# Patient Record
Sex: MALE | Race: WHITE | URBAN - METROPOLITAN AREA
[De-identification: names, ages, dates, MRNs, and addresses within clinical notes are randomized per-mention and may not be internally consistent; named-entity substitution may affect disease eponyms.]

---

## 2017-04-06 ENCOUNTER — DOCTOR'S OFFICE (OUTPATIENT)
Dept: URBAN - METROPOLITAN AREA CLINIC 137 | Facility: CLINIC | Age: 52
Setting detail: OPHTHALMOLOGY
End: 2017-04-06
Payer: COMMERCIAL

## 2017-04-06 DIAGNOSIS — E11.9: ICD-10-CM

## 2017-04-06 DIAGNOSIS — H25.13: ICD-10-CM

## 2017-04-06 PROCEDURE — 92014 COMPRE OPH EXAM EST PT 1/>: CPT | Performed by: OPHTHALMOLOGY

## 2017-04-06 ASSESSMENT — REFRACTION_MANIFEST
OS_VA1: 20/
OS_VA2: 20/
OS_ADD: +1.75
OD_VA3: 20/
OD_ADD: +1.75
OS_SPHERE: +0.50
OD_VA2: 20/
OU_VA: 20/
OS_CYLINDER: SPH
OS_VA3: 20/
OS_VA1: 20/20
OU_VA: 20/
OD_VA3: 20/
OD_VA1: 20/
OD_VA3: 20/
OD_CYLINDER: +0.25
OS_VA1: 20/
OD_AXIS: 143
OD_VA1: 20/
OS_VA2: 20/20
OD_VA2: 20/20
OD_SPHERE: +0.25
OS_VA2: 20/
OD_VA2: 20/
OD_VA1: 20/20
OU_VA: 20/
OS_VA3: 20/
OS_VA3: 20/

## 2017-04-06 ASSESSMENT — CONFRONTATIONAL VISUAL FIELD TEST (CVF)
OD_FINDINGS: FULL
OS_FINDINGS: FULL

## 2017-04-06 ASSESSMENT — REFRACTION_CURRENTRX
OS_CYLINDER: SPH
OD_VPRISM_DIRECTION: SV
OS_OVR_VA: 20/
OD_OVR_VA: 20/
OS_OVR_VA: 20/
OS_OVR_VA: 20/
OD_SPHERE: +2.00
OD_OVR_VA: 20/
OS_VPRISM_DIRECTION: SV
OD_CYLINDER: SPH
OD_OVR_VA: 20/
OS_SPHERE: +2.00

## 2017-04-06 ASSESSMENT — SPHEQUIV_DERIVED
OS_SPHEQUIV: 0.625
OD_SPHEQUIV: 0.375
OD_SPHEQUIV: 0.375

## 2017-04-06 ASSESSMENT — REFRACTION_AUTOREFRACTION
OS_CYLINDER: +0.25
OD_AXIS: 136
OD_SPHERE: +0.25
OD_CYLINDER: +0.25
OS_AXIS: 154
OS_SPHERE: +0.50

## 2017-04-06 ASSESSMENT — VISUAL ACUITY
OD_BCVA: 20/25-1
OS_BCVA: 20/20

## 2018-04-11 ENCOUNTER — DOCTOR'S OFFICE (OUTPATIENT)
Dept: URBAN - METROPOLITAN AREA CLINIC 137 | Facility: CLINIC | Age: 53
Setting detail: OPHTHALMOLOGY
End: 2018-04-11
Payer: COMMERCIAL

## 2018-04-11 DIAGNOSIS — H25.13: ICD-10-CM

## 2018-04-11 DIAGNOSIS — E11.9: ICD-10-CM

## 2018-04-11 PROCEDURE — 92014 COMPRE OPH EXAM EST PT 1/>: CPT | Performed by: OPHTHALMOLOGY

## 2018-04-11 ASSESSMENT — REFRACTION_MANIFEST
OD_VA2: 20/20
OD_VA1: 20/
OD_VA2: 20/
OS_VA2: 20/20
OD_VA2: 20/
OS_CYLINDER: SPH
OS_VA3: 20/
OS_ADD: +1.75
OD_VA1: 20/
OD_VA3: 20/
OS_VA1: 20/
OS_VA3: 20/
OS_VA1: 20/20
OS_SPHERE: +0.50
OD_SPHERE: +0.25
OD_VA3: 20/
OD_CYLINDER: +0.25
OD_VA1: 20/20
OS_VA3: 20/
OS_VA2: 20/
OS_VA2: 20/
OU_VA: 20/
OD_AXIS: 143
OU_VA: 20/
OS_VA1: 20/
OU_VA: 20/
OD_VA3: 20/
OD_ADD: +1.75

## 2018-04-11 ASSESSMENT — REFRACTION_CURRENTRX
OD_OVR_VA: 20/
OS_CYLINDER: SPH
OS_OVR_VA: 20/
OD_OVR_VA: 20/
OS_OVR_VA: 20/
OD_SPHERE: +2.00
OS_VPRISM_DIRECTION: SV
OD_CYLINDER: SPH
OD_OVR_VA: 20/
OS_OVR_VA: 20/
OD_VPRISM_DIRECTION: SV
OS_SPHERE: +2.00

## 2018-04-11 ASSESSMENT — REFRACTION_AUTOREFRACTION
OS_SPHERE: +0.50
OD_CYLINDER: +0.25
OS_CYLINDER: +0.25
OS_AXIS: 154
OD_AXIS: 136
OD_SPHERE: +0.25

## 2018-04-11 ASSESSMENT — SPHEQUIV_DERIVED
OD_SPHEQUIV: 0.375
OS_SPHEQUIV: 0.625
OD_SPHEQUIV: 0.375

## 2018-04-11 ASSESSMENT — CONFRONTATIONAL VISUAL FIELD TEST (CVF)
OD_FINDINGS: FULL
OS_FINDINGS: FULL

## 2018-04-11 ASSESSMENT — VISUAL ACUITY
OD_BCVA: 20/25-2
OS_BCVA: 20/20-2

## 2019-12-05 ENCOUNTER — APPOINTMENT (INPATIENT)
Dept: RADIOLOGY | Facility: HOSPITAL | Age: 54
DRG: 948 | End: 2019-12-05
Payer: COMMERCIAL

## 2019-12-05 ENCOUNTER — HOSPITAL ENCOUNTER (INPATIENT)
Facility: HOSPITAL | Age: 54
LOS: 4 days | Discharge: HOME | DRG: 948 | End: 2019-12-09
Attending: EMERGENCY MEDICINE | Admitting: INTERNAL MEDICINE
Payer: COMMERCIAL

## 2019-12-05 ENCOUNTER — APPOINTMENT (EMERGENCY)
Dept: RADIOLOGY | Facility: HOSPITAL | Age: 54
DRG: 948 | End: 2019-12-05
Payer: COMMERCIAL

## 2019-12-05 DIAGNOSIS — R41.82 ALTERED MENTAL STATUS: Primary | ICD-10-CM

## 2019-12-05 DIAGNOSIS — G25.81 RESTLESS LEG: ICD-10-CM

## 2019-12-05 DIAGNOSIS — D72.829 LEUKOCYTOSIS: ICD-10-CM

## 2019-12-05 DIAGNOSIS — R41.0 DISORIENTATION: ICD-10-CM

## 2019-12-05 DIAGNOSIS — E53.8 FOLIC ACID DEFICIENCY: ICD-10-CM

## 2019-12-05 PROBLEM — F43.29: Status: ACTIVE | Noted: 2019-12-05

## 2019-12-05 PROBLEM — E78.5 HYPERLIPIDEMIA: Status: ACTIVE | Noted: 2019-12-05

## 2019-12-05 PROBLEM — E78.2 MIXED HYPERLIPIDEMIA: Status: ACTIVE | Noted: 2019-12-05

## 2019-12-05 PROBLEM — I10 HYPERTENSION: Status: ACTIVE | Noted: 2019-12-05

## 2019-12-05 PROBLEM — E11.9 DIABETES MELLITUS (HCC): Status: ACTIVE | Noted: 2019-12-05

## 2019-12-05 PROBLEM — Z79.4 TYPE 2 DIABETES MELLITUS WITHOUT COMPLICATION, WITH LONG-TERM CURRENT USE OF INSULIN (HCC): Status: ACTIVE | Noted: 2019-12-05

## 2019-12-05 LAB
ALBUMIN SERPL BCP-MCNC: 4.6 G/DL (ref 3.5–5)
ALP SERPL-CCNC: 102 U/L (ref 46–116)
ALT SERPL W P-5'-P-CCNC: 28 U/L (ref 12–78)
AMMONIA PLAS-SCNC: 42 UMOL/L (ref 11–35)
ANION GAP SERPL CALCULATED.3IONS-SCNC: 7 MMOL/L (ref 4–13)
APAP SERPL-MCNC: <2 UG/ML (ref 10–20)
APPEARANCE CSF: CLEAR
AST SERPL W P-5'-P-CCNC: 12 U/L (ref 5–45)
BASE EX.OXY STD BLDV CALC-SCNC: 81.2 % (ref 60–80)
BASE EXCESS BLDV CALC-SCNC: 0.5 MMOL/L
BASOPHILS # BLD AUTO: 0.1 THOUSANDS/ΜL (ref 0–0.1)
BASOPHILS NFR BLD AUTO: 1 % (ref 0–1)
BILIRUB SERPL-MCNC: 0.33 MG/DL (ref 0.2–1)
BILIRUB UR QL STRIP: NEGATIVE
BUN SERPL-MCNC: 16 MG/DL (ref 5–25)
CALCIUM SERPL-MCNC: 9.9 MG/DL (ref 8.3–10.1)
CHLORIDE SERPL-SCNC: 101 MMOL/L (ref 100–108)
CLARITY UR: CLEAR
CLARITY, POC: CLEAR
CO2 SERPL-SCNC: 29 MMOL/L (ref 21–32)
COLOR UR: YELLOW
COLOR, POC: YELLOW
CREAT SERPL-MCNC: 0.99 MG/DL (ref 0.6–1.3)
EOSINOPHIL # BLD AUTO: 0.18 THOUSAND/ΜL (ref 0–0.61)
EOSINOPHIL NFR BLD AUTO: 2 % (ref 0–6)
ERYTHROCYTE [DISTWIDTH] IN BLOOD BY AUTOMATED COUNT: 12.9 % (ref 11.6–15.1)
ETHANOL SERPL-MCNC: <3 MG/DL (ref 0–3)
GFR SERPL CREATININE-BSD FRML MDRD: 85 ML/MIN/1.73SQ M
GLUCOSE CSF-MCNC: 143 MG/DL (ref 50–80)
GLUCOSE SERPL-MCNC: 145 MG/DL (ref 65–140)
GLUCOSE SERPL-MCNC: 319 MG/DL (ref 65–140)
GLUCOSE SERPL-MCNC: 360 MG/DL (ref 65–140)
GLUCOSE UR STRIP-MCNC: ABNORMAL MG/DL
GRAM STN SPEC: NORMAL
GRAM STN SPEC: NORMAL
HCO3 BLDV-SCNC: 25.6 MMOL/L (ref 24–30)
HCT VFR BLD AUTO: 43.9 % (ref 36.5–49.3)
HGB BLD-MCNC: 14.3 G/DL (ref 12–17)
HGB UR QL STRIP.AUTO: NEGATIVE
IMM GRANULOCYTES # BLD AUTO: 0.03 THOUSAND/UL (ref 0–0.2)
IMM GRANULOCYTES NFR BLD AUTO: 0 % (ref 0–2)
IRON SERPL-MCNC: 54 UG/DL (ref 65–175)
KETONES UR STRIP-MCNC: NEGATIVE MG/DL
LEUKOCYTE ESTERASE UR QL STRIP: NEGATIVE
LYMPHOCYTES # BLD AUTO: 2.09 THOUSANDS/ΜL (ref 0.6–4.47)
LYMPHOCYTES NFR BLD AUTO: 19 % (ref 14–44)
MCH RBC QN AUTO: 28.2 PG (ref 26.8–34.3)
MCHC RBC AUTO-ENTMCNC: 32.6 G/DL (ref 31.4–37.4)
MCV RBC AUTO: 87 FL (ref 82–98)
MONOCYTES # BLD AUTO: 0.76 THOUSAND/ΜL (ref 0.17–1.22)
MONOCYTES NFR BLD AUTO: 7 % (ref 4–12)
NEUTROPHILS # BLD AUTO: 7.99 THOUSANDS/ΜL (ref 1.85–7.62)
NEUTS SEG NFR BLD AUTO: 71 % (ref 43–75)
NITRITE UR QL STRIP: NEGATIVE
NRBC BLD AUTO-RTO: 0 /100 WBCS
O2 CT BLDV-SCNC: 17.5 ML/DL
PCO2 BLDV: 42.7 MM HG (ref 42–50)
PH BLDV: 7.39 [PH] (ref 7.3–7.4)
PH UR STRIP.AUTO: 7 [PH] (ref 4.5–8)
PLATELET # BLD AUTO: 249 THOUSANDS/UL (ref 149–390)
PMV BLD AUTO: 10.5 FL (ref 8.9–12.7)
PO2 BLDV: 51.1 MM HG (ref 35–45)
POTASSIUM SERPL-SCNC: 4.9 MMOL/L (ref 3.5–5.3)
PROT CSF-MCNC: 45 MG/DL (ref 15–45)
PROT SERPL-MCNC: 7 G/DL (ref 6.4–8.2)
PROT UR STRIP-MCNC: NEGATIVE MG/DL
RBC # BLD AUTO: 5.07 MILLION/UL (ref 3.88–5.62)
RBC # CSF MANUAL: 2 UL (ref 0–10)
RBC # CSF MANUAL: 44 UL (ref 0–10)
SALICYLATES SERPL-MCNC: <3 MG/DL (ref 3–20)
SODIUM SERPL-SCNC: 137 MMOL/L (ref 136–145)
SP GR UR STRIP.AUTO: 1.01 (ref 1–1.03)
TOTAL CELLS COUNTED BLD: NO
TROPONIN I SERPL-MCNC: <0.02 NG/ML
TSH SERPL DL<=0.05 MIU/L-ACNC: 1.68 UIU/ML (ref 0.36–3.74)
TUBE # CSF: 4
UROBILINOGEN UR QL STRIP.AUTO: 0.2 E.U./DL
WBC # BLD AUTO: 11.15 THOUSAND/UL (ref 4.31–10.16)
WBC # CSF AUTO: 0 /UL (ref 0–5)

## 2019-12-05 PROCEDURE — 83655 ASSAY OF LEAD: CPT | Performed by: EMERGENCY MEDICINE

## 2019-12-05 PROCEDURE — 93005 ELECTROCARDIOGRAM TRACING: CPT

## 2019-12-05 PROCEDURE — 009U3ZX DRAINAGE OF SPINAL CANAL, PERCUTANEOUS APPROACH, DIAGNOSTIC: ICD-10-PCS | Performed by: EMERGENCY MEDICINE

## 2019-12-05 PROCEDURE — 81003 URINALYSIS AUTO W/O SCOPE: CPT

## 2019-12-05 PROCEDURE — 80320 DRUG SCREEN QUANTALCOHOLS: CPT | Performed by: EMERGENCY MEDICINE

## 2019-12-05 PROCEDURE — 82746 ASSAY OF FOLIC ACID SERUM: CPT | Performed by: INTERNAL MEDICINE

## 2019-12-05 PROCEDURE — 86140 C-REACTIVE PROTEIN: CPT | Performed by: INTERNAL MEDICINE

## 2019-12-05 PROCEDURE — 84484 ASSAY OF TROPONIN QUANT: CPT | Performed by: EMERGENCY MEDICINE

## 2019-12-05 PROCEDURE — 89050 BODY FLUID CELL COUNT: CPT | Performed by: EMERGENCY MEDICINE

## 2019-12-05 PROCEDURE — 82948 REAGENT STRIP/BLOOD GLUCOSE: CPT

## 2019-12-05 PROCEDURE — 87070 CULTURE OTHR SPECIMN AEROBIC: CPT | Performed by: EMERGENCY MEDICINE

## 2019-12-05 PROCEDURE — 87483 CNS DNA AMP PROBE TYPE 12-25: CPT | Performed by: EMERGENCY MEDICINE

## 2019-12-05 PROCEDURE — 99285 EMERGENCY DEPT VISIT HI MDM: CPT | Performed by: EMERGENCY MEDICINE

## 2019-12-05 PROCEDURE — 99244 OFF/OP CNSLTJ NEW/EST MOD 40: CPT | Performed by: EMERGENCY MEDICINE

## 2019-12-05 PROCEDURE — 80053 COMPREHEN METABOLIC PANEL: CPT | Performed by: EMERGENCY MEDICINE

## 2019-12-05 PROCEDURE — 85025 COMPLETE CBC W/AUTO DIFF WBC: CPT | Performed by: EMERGENCY MEDICINE

## 2019-12-05 PROCEDURE — 82607 VITAMIN B-12: CPT | Performed by: INTERNAL MEDICINE

## 2019-12-05 PROCEDURE — 96360 HYDRATION IV INFUSION INIT: CPT

## 2019-12-05 PROCEDURE — 89051 BODY FLUID CELL COUNT: CPT | Performed by: EMERGENCY MEDICINE

## 2019-12-05 PROCEDURE — 70496 CT ANGIOGRAPHY HEAD: CPT

## 2019-12-05 PROCEDURE — 36415 COLL VENOUS BLD VENIPUNCTURE: CPT | Performed by: EMERGENCY MEDICINE

## 2019-12-05 PROCEDURE — 82140 ASSAY OF AMMONIA: CPT | Performed by: EMERGENCY MEDICINE

## 2019-12-05 PROCEDURE — 84425 ASSAY OF VITAMIN B-1: CPT | Performed by: INTERNAL MEDICINE

## 2019-12-05 PROCEDURE — 70450 CT HEAD/BRAIN W/O DYE: CPT

## 2019-12-05 PROCEDURE — 70551 MRI BRAIN STEM W/O DYE: CPT

## 2019-12-05 PROCEDURE — 96361 HYDRATE IV INFUSION ADD-ON: CPT

## 2019-12-05 PROCEDURE — 82945 GLUCOSE OTHER FLUID: CPT | Performed by: EMERGENCY MEDICINE

## 2019-12-05 PROCEDURE — 84443 ASSAY THYROID STIM HORMONE: CPT | Performed by: EMERGENCY MEDICINE

## 2019-12-05 PROCEDURE — 80329 ANALGESICS NON-OPIOID 1 OR 2: CPT | Performed by: EMERGENCY MEDICINE

## 2019-12-05 PROCEDURE — 84157 ASSAY OF PROTEIN OTHER: CPT | Performed by: EMERGENCY MEDICINE

## 2019-12-05 PROCEDURE — 71046 X-RAY EXAM CHEST 2 VIEWS: CPT

## 2019-12-05 PROCEDURE — 99285 EMERGENCY DEPT VISIT HI MDM: CPT

## 2019-12-05 PROCEDURE — 82805 BLOOD GASES W/O2 SATURATION: CPT | Performed by: EMERGENCY MEDICINE

## 2019-12-05 PROCEDURE — 83036 HEMOGLOBIN GLYCOSYLATED A1C: CPT | Performed by: INTERNAL MEDICINE

## 2019-12-05 PROCEDURE — 83540 ASSAY OF IRON: CPT | Performed by: EMERGENCY MEDICINE

## 2019-12-05 RX ORDER — INSULIN GLARGINE 100 [IU]/ML
30 INJECTION, SOLUTION SUBCUTANEOUS 2 TIMES DAILY
Status: DISCONTINUED | OUTPATIENT
Start: 2019-12-06 | End: 2019-12-07

## 2019-12-05 RX ORDER — ASPIRIN 81 MG/1
81 TABLET ORAL DAILY
COMMUNITY

## 2019-12-05 RX ORDER — ATORVASTATIN CALCIUM 40 MG/1
40 TABLET, FILM COATED ORAL
Status: DISCONTINUED | OUTPATIENT
Start: 2019-12-06 | End: 2019-12-09 | Stop reason: HOSPADM

## 2019-12-05 RX ORDER — DOCUSATE SODIUM 100 MG/1
100 CAPSULE, LIQUID FILLED ORAL 2 TIMES DAILY PRN
Status: DISCONTINUED | OUTPATIENT
Start: 2019-12-05 | End: 2019-12-09 | Stop reason: HOSPADM

## 2019-12-05 RX ORDER — INSULIN GLARGINE 100 [IU]/ML
30 INJECTION, SOLUTION SUBCUTANEOUS 2 TIMES DAILY
COMMUNITY
End: 2020-09-15

## 2019-12-05 RX ORDER — ONDANSETRON 2 MG/ML
4 INJECTION INTRAMUSCULAR; INTRAVENOUS EVERY 6 HOURS PRN
Status: DISCONTINUED | OUTPATIENT
Start: 2019-12-05 | End: 2019-12-09 | Stop reason: HOSPADM

## 2019-12-05 RX ORDER — LANSOPRAZOLE 15 MG/1
15 CAPSULE, DELAYED RELEASE ORAL DAILY
COMMUNITY
End: 2022-01-25 | Stop reason: ALTCHOICE

## 2019-12-05 RX ORDER — MAGNESIUM HYDROXIDE/ALUMINUM HYDROXICE/SIMETHICONE 120; 1200; 1200 MG/30ML; MG/30ML; MG/30ML
30 SUSPENSION ORAL EVERY 6 HOURS PRN
Status: DISCONTINUED | OUTPATIENT
Start: 2019-12-05 | End: 2019-12-09 | Stop reason: HOSPADM

## 2019-12-05 RX ORDER — ROSUVASTATIN CALCIUM 20 MG/1
20 TABLET, COATED ORAL DAILY
COMMUNITY
End: 2020-08-11

## 2019-12-05 RX ORDER — ASPIRIN 81 MG/1
81 TABLET ORAL DAILY
Status: DISCONTINUED | OUTPATIENT
Start: 2019-12-06 | End: 2019-12-09 | Stop reason: HOSPADM

## 2019-12-05 RX ORDER — NICOTINE 21 MG/24HR
1 PATCH, TRANSDERMAL 24 HOURS TRANSDERMAL DAILY
Status: DISCONTINUED | OUTPATIENT
Start: 2019-12-06 | End: 2019-12-06

## 2019-12-05 RX ORDER — FENOFIBRATE 145 MG/1
145 TABLET, COATED ORAL DAILY
Status: DISCONTINUED | OUTPATIENT
Start: 2019-12-06 | End: 2019-12-09 | Stop reason: HOSPADM

## 2019-12-05 RX ORDER — IRBESARTAN 300 MG/1
300 TABLET ORAL DAILY
COMMUNITY

## 2019-12-05 RX ORDER — LORAZEPAM 2 MG/ML
1 INJECTION INTRAMUSCULAR ONCE
Status: COMPLETED | OUTPATIENT
Start: 2019-12-05 | End: 2019-12-05

## 2019-12-05 RX ORDER — LOSARTAN POTASSIUM 50 MG/1
100 TABLET ORAL DAILY
Status: DISCONTINUED | OUTPATIENT
Start: 2019-12-06 | End: 2019-12-09 | Stop reason: HOSPADM

## 2019-12-05 RX ORDER — ACETAMINOPHEN 325 MG/1
650 TABLET ORAL EVERY 4 HOURS PRN
Status: DISCONTINUED | OUTPATIENT
Start: 2019-12-05 | End: 2019-12-09 | Stop reason: HOSPADM

## 2019-12-05 RX ORDER — LORAZEPAM 2 MG/ML
INJECTION INTRAMUSCULAR
Status: COMPLETED
Start: 2019-12-05 | End: 2019-12-05

## 2019-12-05 RX ORDER — FENOFIBRATE 145 MG/1
145 TABLET, COATED ORAL DAILY
COMMUNITY

## 2019-12-05 RX ORDER — SODIUM CHLORIDE 9 MG/ML
75 INJECTION, SOLUTION INTRAVENOUS CONTINUOUS
Status: DISCONTINUED | OUTPATIENT
Start: 2019-12-05 | End: 2019-12-06

## 2019-12-05 RX ORDER — PANTOPRAZOLE SODIUM 40 MG/1
40 TABLET, DELAYED RELEASE ORAL
Status: DISCONTINUED | OUTPATIENT
Start: 2019-12-06 | End: 2019-12-09 | Stop reason: HOSPADM

## 2019-12-05 RX ADMIN — LORAZEPAM 1 MG: 2 INJECTION INTRAMUSCULAR; INTRAVENOUS at 22:42

## 2019-12-05 RX ADMIN — LORAZEPAM 1 MG: 2 INJECTION INTRAMUSCULAR at 22:40

## 2019-12-05 RX ADMIN — LORAZEPAM 1 MG: 2 INJECTION INTRAMUSCULAR; INTRAVENOUS at 22:40

## 2019-12-05 RX ADMIN — SODIUM CHLORIDE 75 ML/HR: 0.9 INJECTION, SOLUTION INTRAVENOUS at 23:36

## 2019-12-05 RX ADMIN — SODIUM CHLORIDE 1000 ML: 0.9 INJECTION, SOLUTION INTRAVENOUS at 16:16

## 2019-12-05 RX ADMIN — IOHEXOL 85 ML: 350 INJECTION, SOLUTION INTRAVENOUS at 19:43

## 2019-12-05 NOTE — LETTER
[address] [facility] [address]  [facility] [address]  Dept: [phone]    December 9, 2019     Patient: Kavita Verdugo   YOB: 1965   Date of Visit: 12/5/2019       To Whom it May Concern:    Kavita Verdugo is under my professional care  He was seen in the hospital from 12/5/2019   to 12/09/19  He may return to work on 12/18/19 with the following limitations do not use heavy machinery, drive, use electrical equipment       If you have any questions or concerns, please don't hesitate to call           Sincerely,          Donald Dias MD

## 2019-12-05 NOTE — ED PROVIDER NOTES
History  Chief Complaint   Patient presents with    Altered Mental Status     EMS was called for patient by his coworkers for [facility]  Pt has no idea what has happened today  Repeatative questioning     54 y/o M with a pmhx of DM insulin dependant presents via EMS from work for [facility]  Pt was observed to be acting funny at work and EMS was called  Pt's Glucose was 440 for EMS  Pt states that he has no complaints and repeatedly asks why he is here  Pt's wife states that over the past 2 weeks he has had confusion that has been intermittent  Pt has not hx or fhx of psychiatric dz or dementia  No head trauma or ID exposures  Spouse states that he has hd 3 CTs of the head which were negative and has decline MRIs due to claustrophobia  Prior to Admission Medications   Prescriptions Last Dose Informant Patient Reported? Taking? Liraglutide (VICTOZA SC)   Yes Yes   Sig: Inject 1 8 mcg under the skin daily   aspirin (ECOTRIN LOW STRENGTH) 81 mg EC tablet   Yes Yes   Sig: Take 81 mg by mouth daily   fenofibrate (TRICOR) 145 mg tablet   Yes Yes   Sig: Take 145 mg by mouth daily   insulin glargine (LANTUS) 100 units/mL subcutaneous injection   Yes Yes   Sig: Inject 30 Units under the skin 2 (two) times a day   irbesartan (AVAPRO) 300 mg tablet   Yes Yes   Sig: Take 300 mg by mouth daily   lansoprazole (PREVACID) 15 mg capsule   Yes Yes   Sig: Take 15 mg by mouth daily   metFORMIN (GLUCOPHAGE) 1000 MG tablet   Yes Yes   Sig: Take 1,000 mg by mouth 2 (two) times a day   rosuvastatin (CRESTOR) 20 MG tablet   Yes Yes   Sig: Take 20 mg by mouth daily      Facility-Administered Medications: None       Past Medical History:   Diagnosis Date    Diabetes mellitus ([address] )     GERD (gastroesophageal reflux disease)     Hyperlipidemia     Hypertension        History reviewed  No pertinent surgical history  History reviewed  No pertinent family history  I have reviewed and agree with the history as documented      Social History     Tobacco Use    Smoking status: Current Every Day Smoker     Packs/day: 0 50     Types: Cigarettes    Smokeless tobacco: Current User   Substance Use Topics    Alcohol use: Yes     Frequency: Never    Drug use: Never        Review of Systems   Unable to perform ROS: Mental status change       Physical Exam  ED Triage Vitals   Temperature Pulse Respirations Blood Pressure SpO2   12/05/19 1621 12/05/19 1545 12/05/19 1545 12/05/19 1545 12/05/19 1545   98 1 °F (36 7 °C) 89 16 152/75 97 %      Temp Source Heart Rate Source Patient Position - Orthostatic VS BP Location FiO2 (%)   12/05/19 1621 12/05/19 1545 12/05/19 1718 12/05/19 1718 --   Oral Monitor Sitting Right arm       Pain Score       12/05/19 1545       No Pain             Orthostatic Vital Signs  Vitals:    12/08/19 1634 12/09/19 0109 12/09/19 0744 12/09/19 1520   BP: 131/78 107/59 128/75 125/76   Pulse: 72 62 64 73   Patient Position - Orthostatic VS:  Lying         Physical Exam   Constitutional: He is oriented to person, place, and time  He appears well-developed and well-nourished  No distress  HENT:   Head: Normocephalic and atraumatic  Eyes: EOM are normal    Neck: Normal range of motion  Cardiovascular: Normal rate and regular rhythm  Exam reveals no gallop and no friction rub  No murmur heard  Pulmonary/Chest: Breath sounds normal  No respiratory distress  He has no wheezes  He has no rales  Abdominal: Soft  Normal appearance and bowel sounds are normal  There is no tenderness  There is no rigidity, no rebound, no guarding, no CVA tenderness, no tenderness at McBurney's point and negative Gardner's sign  Neurological: He is alert and oriented to person, place, and time  He has normal strength  He is not disoriented  No cranial nerve deficit or sensory deficit  GCS eye subscore is 4  GCS verbal subscore is 5  GCS motor subscore is 6  Reflex Scores:       Bicep reflexes are 2+ on the right side and 2+ on the left side  Patellar reflexes are 2+ on the right side and 2+ on the left side  Patient has equal 5/5 strength b/l UE and Le  No focal neuro deficits noted  Patient is a and O x4, however, continues to ask why he is in the emergency department   Skin: Skin is warm and dry  Capillary refill takes less than 2 seconds  Psychiatric: He has a normal mood and affect  His behavior is normal  Judgment and thought content normal    Nursing note and vitals reviewed  ED Medications  Medications   sodium chloride 0 9 % bolus 1,000 mL (0 mL Intravenous Stopped 12/5/19 2130)   iohexol (OMNIPAQUE) 350 MG/ML injection (MULTI-DOSE) 85 mL (85 mL Intravenous Given 12/5/19 1943)   LORazepam (ATIVAN) 2 mg/mL injection 1 mg (1 mg Intravenous Given 12/5/19 2240)   LORazepam (ATIVAN) 2 mg/mL injection 1 mg (1 mg Intravenous Given 12/5/19 2242)       Diagnostic Studies  Results Reviewed     Procedure Component Value Units Date/Time    Vitamin B1, whole blood [[id number]] Collected:  12/05/19 2334    Lab Status:  Final result Specimen:  Blood from Arm, Right Updated:  12/10/19 1406     Vitamin B1, Whole Blood 161 4 nmol/L     Narrative:       Test(s) 121188-Vit  B1, Whole Blood  was developed and its performance characteristics determined  by [facility]  It has not been cleared or approved by the Food  and Drug Administration    Performed at:  [address]  [id number]  : Zunilda Villafana MD, Phone:  [phone]    CSF, Culture and Gram stain (Tube 3) [[id number]] Collected:  12/05/19 2208    Lab Status:  Final result Specimen:  Cerebrospinal Fluid from Lumbar Puncture Updated:  12/09/19 0817     CSF Culture No growth    Lead, blood [[id number]] Collected:  12/05/19 1615    Lab Status:  Final result Specimen:  Blood from Arm, Right Updated:  12/06/19 1405     Lead <1 ug/dL     Narrative:       Performed at:  [address] [place] [address], Scott Andersen  [id number]  : Fuentes Yao MD, Phone:  [phone]    Rapid drug screen, urine [[id number]]  (Normal) Collected:  12/06/19 0214    Lab Status:  Final result Specimen:  Urine, Clean Catch Updated:  12/06/19 1111     Amph/Meth UR Negative     Barbiturate Ur Negative     Benzodiazepine Urine Negative     Cocaine Urine Negative     Methadone Urine Negative     Opiate Urine Negative     PCP Ur Negative     THC Urine Negative    Narrative:       FOR MEDICAL PURPOSES ONLY  IF CONFIRMATION NEEDED PLEASE CONTACT THE LAB WITHIN 5 DAYS      Drug Screen Cutoff Levels:  AMPHETAMINE/METHAMPHETAMINES  1000 ng/mL  BARBITURATES     200 ng/mL  BENZODIAZEPINES     200 ng/mL  COCAINE      300 ng/mL  METHADONE      300 ng/mL  OPIATES      300 ng/mL  PHENCYCLIDINE     25 ng/mL  THC       50 ng/mL      Fingerstick Glucose (POCT) [[id number]]  (Abnormal) Collected:  12/06/19 1106    Lab Status:  Final result Updated:  12/06/19 1109     POC Glucose 220 mg/dl     Ammonia [[id number]]  (Normal) Collected:  12/06/19 1008    Lab Status:  Final result Specimen:  Blood from Arm, Right Updated:  12/06/19 1036     Ammonia 20 umol/L     Hemoglobin A1c w/EAG Estimation (Orders if not completed within the last 90 days) [[id number]]  (Abnormal) Collected:  12/05/19 2334    Lab Status:  Final result Specimen:  Blood from Arm, Right Updated:  12/06/19 0840     Hemoglobin A1C 7 5 %       mg/dl     Folate [[id number]]  (Abnormal) Collected:  12/05/19 2334    Lab Status:  Final result Specimen:  Blood from Arm, Right Updated:  12/06/19 0806     Folate 19 8 ng/mL     Vitamin B12 [[id number]]  (Normal) Collected:  12/05/19 2334    Lab Status:  Final result Specimen:  Blood from Arm, Right Updated:  12/06/19 0806     Vitamin B-12 498 pg/mL     C-reactive protein [[id number]]  (Normal) Collected:  12/05/19 2334    Lab Status:  Final result Specimen:  Blood from Arm, Right Updated:  12/06/19 0740     CRP <3 0 mg/L     Fingerstick Glucose (POCT) [[id number]]  (Abnormal) Collected:  12/06/19 4754    Lab Status:  Final result Updated:  12/06/19 0716     POC Glucose 303 mg/dl     TSH, 3rd generation [[id number]]  (Normal) Collected:  12/06/19 0422    Lab Status:  Final result Specimen:  Blood from Arm, Left Updated:  12/06/19 0507     TSH 3RD GENERATON 2 210 uIU/mL     Narrative:       Patients undergoing fluorescein dye angiography may retain small amounts of fluorescein in the body for 48-72 hours post procedure  Samples containing fluorescein can produce falsely depressed TSH values  If the patient had this procedure,a specimen should be resubmitted post fluorescein clearance        Ferritin [[id number]]  (Normal) Collected:  12/06/19 0422    Lab Status:  Final result Specimen:  Blood from Arm, Left Updated:  12/06/19 0507     Ferritin 163 ng/mL     Comprehensive metabolic panel [[id number]]  (Abnormal) Collected:  12/06/19 0422    Lab Status:  Final result Specimen:  Blood from Arm, Left Updated:  12/06/19 0506     Sodium 138 mmol/L      Potassium 3 9 mmol/L      Chloride 108 mmol/L      CO2 28 mmol/L      ANION GAP 2 mmol/L      BUN 13 mg/dL      Creatinine 0 75 mg/dL      Glucose 175 mg/dL      Calcium 8 5 mg/dL      AST 10 U/L      ALT 23 U/L      Alkaline Phosphatase 77 U/L      Total Protein 6 4 g/dL      Albumin 3 7 g/dL      Total Bilirubin 0 51 mg/dL      eGFR 103 ml/min/1 73sq m     Narrative:       [facility] guidelines for Chronic Kidney Disease (CKD):     Stage 1 with normal or high GFR (GFR > 90 mL/min/1 73 square meters)    Stage 2 Mild CKD (GFR = 60-89 mL/min/1 73 square meters)    Stage 3A Moderate CKD (GFR = 45-59 mL/min/1 73 square meters)    Stage 3B Moderate CKD (GFR = 30-44 mL/min/1 73 square meters)    Stage 4 Severe CKD (GFR = 15-29 mL/min/1 73 square meters)    Stage 5 End Stage CKD (GFR <15 mL/min/1 73 square meters)  Note: GFR calculation is accurate only with a steady state creatinine    Iron Saturation % [[id number]] Collected:  12/06/19 0422    Lab Status:  Final result Specimen:  Blood from Arm, Left Updated:  12/06/19 0506     Iron Saturation 32 %      TIBC 267 ug/dL      Iron 86 ug/dL     Lipid Panel with Direct LDL reflex [[id number]]  (Abnormal) Collected:  12/06/19 0422    Lab Status:  Final result Specimen:  Blood from Arm, Left Updated:  12/06/19 0501     Cholesterol 121 mg/dL      Triglycerides 113 mg/dL      HDL, Direct 33 mg/dL      LDL Calculated 65 mg/dL     Magnesium [[id number]]  (Normal) Collected:  12/06/19 0422    Lab Status:  Final result Specimen:  Blood from Arm, Left Updated:  12/06/19 0501     Magnesium 1 9 mg/dL     Phosphorus [[id number]]  (Normal) Collected:  12/06/19 0422    Lab Status:  Final result Specimen:  Blood from Arm, Left Updated:  12/06/19 0501     Phosphorus 3 5 mg/dL     CBC and differential [[id number]] Collected:  12/06/19 0422    Lab Status:  Final result Specimen:  Blood from Arm, Left Updated:  12/06/19 0450     WBC 9 69 Thousand/uL      RBC 4 85 Million/uL      Hemoglobin 13 6 g/dL      Hematocrit 41 8 %      MCV 86 fL      MCH 28 0 pg      MCHC 32 5 g/dL      RDW 13 0 %      MPV 10 3 fL      Platelets 363 Thousands/uL      nRBC 0 /100 WBCs      Neutrophils Relative 62 %      Immat GRANS % 0 %      Lymphocytes Relative 26 %      Monocytes Relative 8 %      Eosinophils Relative 3 %      Basophils Relative 1 %      Neutrophils Absolute 6 03 Thousands/µL      Immature Grans Absolute 0 02 Thousand/uL      Lymphocytes Absolute 2 56 Thousands/µL      Monocytes Absolute 0 75 Thousand/µL      Eosinophils Absolute 0 25 Thousand/µL      Basophils Absolute 0 08 Thousands/µL     Urine Microscopic [[id number]]  (Normal) Collected:  12/06/19 0214    Lab Status:  Final result Specimen:  Urine, Clean Catch Updated:  12/06/19 0238     RBC, UA None Seen /hpf      WBC, UA None Seen /hpf      Epithelial Cells None Seen /hpf      Bacteria, UA None Seen /hpf      Hyaline Casts, UA None Seen /lpf     UA (URINE) with reflex to Scope [[id number]]  (Abnormal) Collected:  12/06/19 0214    Lab Status:  Final result Specimen:  Urine, Clean Catch Updated:  12/06/19 0235     Color, UA Yellow     Clarity, UA Clear     Specific Gravity, UA 1 031     pH, UA 7 0     Leukocytes, UA Elevated glucose may cause decreased leukocyte values  See urine microscopic for [facility] result/     Nitrite, UA Negative     Protein, UA Negative mg/dl      Glucose, UA >=1000 (1%) mg/dl      Ketones, UA Negative mg/dl      Urobilinogen, UA 0 2 E U /dl      Bilirubin, UA Negative     Blood, UA Negative    Meningitis/Encephalitis (ME) Panel [[id number]]  (Normal) Collected:  12/05/19 2208    Lab Status:  Final result Specimen:  Cerebrospinal Fluid from Lumbar Puncture Updated:  12/06/19 0016     C  NEOFORMANS/GATTII Not Detected     CYTOMEGALOVIRUS Not Detected     ENTEROVIRUS Not Detected     E COLI K1 Not Detected     H INFLUENZAE Not Detected     H SIMPLEX 1 Not Detected     H SIMPLEX 2 Not Detected     HERPES VIRUS 6 Not Detected     PARECHOVIRUS Not Detected     L  MONOCYTOGENES Not Detected     N MENINGITIDIS Not Detected     S AGALACTIAE Not Detected     S  PNEUMONIAE Not Detected     V ZOSTER Not Detected    CSF, RBC count (Tube 4) [[id number]]  (Normal) Collected:  12/05/19 2207    Lab Status:  Final result Specimen:  Cerebrospinal Fluid from Lumbar Puncture Updated:  12/05/19 2351     RBC, CSF 2 uL     CSF, RBC count (Tube 1) [[id number]]  (Abnormal) Collected:  12/05/19 2207    Lab Status:  Final result Specimen:  Cerebrospinal Fluid from Lumbar Puncture Updated:  12/05/19 2350     RBC, CSF 44 uL     CSF, White cell count with differential (Tube 4) [[id number]] Collected:  12/05/19 2208    Lab Status:  Final result Specimen:  Cerebrospinal Fluid from Lumbar Puncture Updated:  12/05/19 2350     Appearance, CSF clear     Tube Number, CSF 4     WBC, CSF 0 /uL      Xanthochromia No    CSF, Gram Stain (Tube 3) [[id number]] Collected:  12/05/19 2207    Lab Status:  Final result Specimen:  Cerebrospinal Fluid from Lumbar Puncture Updated:  12/05/19 2347     Gram Stain Result No bacteria seen      No polys seen    CSF, Total Protein (Tube 2) [[id number]]  (Normal) Collected:  12/05/19 2207    Lab Status:  Final result Specimen:  Cerebrospinal Fluid from Lumbar Puncture Updated:  12/05/19 2235     Protein, CSF 45 mg/dL     CSF, Glucose (Tube 2) [[id number]]  (Abnormal) Collected:  12/05/19 2207    Lab Status:  Final result Specimen:  Cerebrospinal Fluid from Lumbar Puncture Updated:  12/05/19 2235     Glucose,  mg/dL     Fingerstick Glucose (POCT) [[id number]]  (Abnormal) Collected:  12/05/19 2206    Lab Status:  Final result Updated:  12/05/19 2207     POC Glucose 145 mg/dl     POCT urinalysis dipstick [[id number]]  (Normal) Resulted:  12/05/19 1827    Lab Status:  Final result Specimen:  Urine Updated:  12/05/19 1827     Color, UA Yellow     Clarity, UA Clear    Urine Macroscopic, POC [[id number]]  (Abnormal) Collected:  12/05/19 1826    Lab Status:  Final result Specimen:  Urine Updated:  12/05/19 1826     Color, UA Yellow     Clarity, UA Clear     pH, UA 7 0     Leukocytes, UA Negative     Nitrite, UA Negative     Protein, UA Negative mg/dl      Glucose,  (1/2%) mg/dl      Ketones, UA Negative mg/dl      Urobilinogen, UA 0 2 E U /dl      Bilirubin, UA Negative     Blood, UA Negative     Specific Gravity, UA 1 015    Narrative:       [facility] RESULT    Comprehensive metabolic panel [[id number]]  (Abnormal) Collected:  12/05/19 1556    Lab Status:  Final result Specimen:  Blood from Arm, Right Updated:  12/05/19 1645     Sodium 137 mmol/L      Potassium 4 9 mmol/L      Chloride 101 mmol/L      CO2 29 mmol/L      ANION GAP 7 mmol/L      BUN 16 mg/dL      Creatinine 0 99 mg/dL      Glucose 360 mg/dL      Calcium 9 9 mg/dL      AST 12 U/L      ALT 28 U/L      Alkaline Phosphatase 102 U/L      Total Protein 7 0 g/dL      Albumin 4 6 g/dL      Total Bilirubin 0 33 mg/dL      eGFR 85 ml/min/1 73sq m     Narrative:       [employer] guidelines for Chronic Kidney Disease (CKD):     Stage 1 with normal or high GFR (GFR > 90 mL/min/1 73 square meters)    Stage 2 Mild CKD (GFR = 60-89 mL/min/1 73 square meters)    Stage 3A Moderate CKD (GFR = 45-59 mL/min/1 73 square meters)    Stage 3B Moderate CKD (GFR = 30-44 mL/min/1 73 square meters)    Stage 4 Severe CKD (GFR = 15-29 mL/min/1 73 square meters)    Stage 5 End Stage CKD (GFR <15 mL/min/1 73 square meters)  Note: GFR calculation is accurate only with a steady state creatinine    TSH, 3rd generation with Free T4 reflex [[id number]]  (Normal) Collected:  12/05/19 1556    Lab Status:  Final result Specimen:  Blood from Arm, Right Updated:  12/05/19 1645     TSH 3RD GENERATON 1 680 uIU/mL     Narrative:       Patients undergoing fluorescein dye angiography may retain small amounts of fluorescein in the body for 48-72 hours post procedure  Samples containing fluorescein can produce falsely depressed TSH values  If the patient had this procedure,a specimen should be resubmitted post fluorescein clearance  Salicylate level [[id number]]  (Abnormal) Collected:  12/05/19 1556    Lab Status:  Final result Specimen:  Blood from Arm, Right Updated:  35/39/18 9620     Salicylate Lvl <3 mg/dL     Acetaminophen level-If concentration is detectable, please discuss with medical  on call   [[id number]]  (Abnormal) Collected:  12/05/19 1556    Lab Status:  Final result Specimen:  Blood from Arm, Right Updated:  12/05/19 1645     Acetaminophen Level <2 ug/mL     Troponin I [[id number]]  (Normal) Collected:  12/05/19 1553    Lab Status:  Final result Specimen:  Blood from Arm, Right Updated:  12/05/19 1637     Troponin I <0 02 ng/mL     Iron [[id number]]  (Abnormal) Collected:  12/05/19 1556    Lab Status:  Final result Specimen:  Blood from Arm, Right Updated:  12/05/19 1636     Iron 54 ug/dL     Ammonia [[id number]]  (Abnormal) Collected:  12/05/19 1553    Lab Status:  Final result Specimen:  Blood from Arm, Right Updated:  12/05/19 1634     Ammonia 42 umol/L     Ethanol [[id number]]  (Normal) Collected:  12/05/19 1553    Lab Status:  Final result Specimen:  Blood from Arm, Right Updated:  12/05/19 1631     Ethanol Lvl <3 mg/dL     CBC and differential [[id number]]  (Abnormal) Collected:  12/05/19 1553    Lab Status:  Final result Specimen:  Blood from Arm, Left Updated:  12/05/19 1618     WBC 11 15 Thousand/uL      RBC 5 07 Million/uL      Hemoglobin 14 3 g/dL      Hematocrit 43 9 %      MCV 87 fL      MCH 28 2 pg      MCHC 32 6 g/dL      RDW 12 9 %      MPV 10 5 fL      Platelets 279 Thousands/uL      nRBC 0 /100 WBCs      Neutrophils Relative 71 %      Immat GRANS % 0 %      Lymphocytes Relative 19 %      Monocytes Relative 7 %      Eosinophils Relative 2 %      Basophils Relative 1 %      Neutrophils Absolute 7 99 Thousands/µL      Immature Grans Absolute 0 03 Thousand/uL      Lymphocytes Absolute 2 09 Thousands/µL      Monocytes Absolute 0 76 Thousand/µL      Eosinophils Absolute 0 18 Thousand/µL      Basophils Absolute 0 10 Thousands/µL     Blood gas, venous [[id number]]  (Abnormal) Collected:  12/05/19 1553    Lab Status:  Final result Specimen:  Blood from Arm, Right Updated:  12/05/19 1617     pH, Sanjay 7 395     pCO2, Sanjay 42 7 mm Hg      pO2, Sanjay 51 1 mm Hg      HCO3, Sanjay 25 6 mmol/L      Base Excess, Sanjay 0 5 mmol/L      O2 Content, Sanjay 17 5 ml/dL      O2 HGB, VENOUS 81 2 %     Fingerstick Glucose (POCT) [[id number]]  (Abnormal) Collected:  12/05/19 1539    Lab Status:  Final result Updated:  12/05/19 1540     POC Glucose 319 mg/dl                  MRI brain wo contrast   Final Result by Ayad Estrella MD (12/05 2335)         1  No acute intracranial process  2   Paranasal sinus disease        Workstation performed: [id number]         CTA head with and without contrast   ED Interpretation by Odette Mcgowan DO (12/05 2032) No hemodynamically significant stenosis or occlusion of the major vessels of the [place]  Workstation performed: [id number]         Final Result by Adriano Dunbar MD (12/05 2017)      No hemodynamically significant stenosis or occlusion of the major vessels of the [place]  Workstation performed: [id number]         XR chest 2 views   Final Result by Ashok Shannon MD (12/06 9886)      No acute cardiopulmonary disease  Workstation performed: [id number]         CT head without contrast   ED Interpretation by [facility],  (12/05 7135)      No acute intracranial abnormality  Workstation performed: [id number]         Final Result by Rachael Jo MD (12/05 1622)      No acute intracranial abnormality  Workstation performed: [id number]               Procedures  Procedures      ED Course                               MDM  Number of Diagnoses or Management Options  Altered mental status: new and requires workup  Leukocytosis: new and requires workup  Diagnosis management comments: CTA, CT negative for acute pathology  Spoke to Neurology recommends lumbar puncture for evaluation of herpes encephalitis  MRI pending completion    1  Encephalopathy  -admission to Medicine  -neurology consult  -toxicology consult  -LP  -acyclovir 10mg/kg Q8hrs started    2  Leukocytosis    3  Hyperglycemia        Disposition  Final diagnoses:    Altered mental status   Leukocytosis     Time reflects when diagnosis was documented in both MDM as applicable and the Disposition within this note     Time User Action Codes Description Comment    12/5/2019  9:12 PM Jyotsna Pérez Add [R41 82] Altered mental status     12/5/2019  9:12 PM Nancie Good [C93 025] Leukocytosis     12/7/2019 12:26 PM Pinky Mack [R41 0] Disorientation     12/9/2019  4:16 PM Beth Ahuja Add [M17 5] Folic acid deficiency     12/9/2019  4:16 PM Renzo Gomez [G25 81] Restless leg       ED Disposition     ED Disposition Condition Date/Time Comment    Admit Stable Thu Dec 5, 2019  9:12 PM Case was discussed with DUANE and the patient's admission status was agreed to be Admission Status: inpatient status to the service of [facility]        Follow-up Information     Follow up With Specialties Details Why Contact Info Additional Information    Naida Michael MD Family Medicine Schedule an appointment as soon as possible for a visit in 1 week(s)  [address]  [facility] [id number]  [facility] Neurology [address] Neurology Follow up Office will contact you to schedule follow-up appointment  If you do not hear from office in 7-10 days, please call to schedule appointment    [facility] [address] [phone] [facility] Neurology [address], [address] [place], [place], [address]    [address] [place] [address] Psychiatry Ophthalmology Schedule an appointment as soon as possible for a visit in 1 week(s)  [facility] [address]  [phone]             Discharge Medication List as of 12/9/2019  4:26 PM      START taking these medications    Details   ascorbic acid (VITAMIN C) 500 MG tablet Take 1 tablet (500 mg total) by mouth daily, Starting Tue 12/10/2019, Normal      ferrous sulfate 325 (65 Fe) mg tablet Take 1 tablet (325 mg total) by mouth daily with breakfast, Starting Tue 58/81/3485, Normal      folic acid (FOLVITE) 1 mg tablet Take 1 tablet (1 mg total) by mouth daily, Starting Tue 12/10/2019, Normal         CONTINUE these medications which have NOT CHANGED    Details   aspirin (ECOTRIN LOW STRENGTH) 81 mg EC tablet Take 81 mg by mouth daily, Historical Med      fenofibrate (TRICOR) 145 mg tablet Take 145 mg by mouth daily, Historical Med      insulin glargine (LANTUS) 100 units/mL subcutaneous injection Inject 30 Units under the skin 2 (two) times a day, Historical Med irbesartan (AVAPRO) 300 mg tablet Take 300 mg by mouth daily, Historical Med      lansoprazole (PREVACID) 15 mg capsule Take 15 mg by mouth daily, Historical Med      Liraglutide (VICTOZA SC) Inject 1 8 mcg under the skin daily, Historical Med      metFORMIN (GLUCOPHAGE) 1000 MG tablet Take 1,000 mg by mouth 2 (two) times a day, Starting Thu 9/5/2019, Historical Med      rosuvastatin (CRESTOR) 20 MG tablet Take 20 mg by mouth daily, Historical Med           Outpatient Discharge Orders   Discharge Diet     Activity as tolerated     Call provider for:  persistent nausea or vomiting     Call provider for:  severe uncontrolled pain     Call provider for:  redness, tenderness, or signs of infection (pain, swelling, redness, odor or green/yellow discharge around incision site)     Call provider for: active or persistent bleeding     Call provider for:  difficulty breathing, headache or visual disturbances     Call provider for:  persistent dizziness or light-headedness     EEG Sleep deprived   Standing Status: Future Standing Exp  Date: 12/09/20       ED Provider  Attending physically available and evaluated Adela Garcia I managed the patient along with the ED Attending      Electronically Signed by         Dayan Almanza DO  12/11/19 0750

## 2019-12-05 NOTE — ED ATTENDING ATTESTATION
12/5/2019  I, Nicole Zavala MD, saw and evaluated the patient  I have discussed the patient with the resident/non-physician practitioner and agree with the resident's/non-physician practitioner's findings, Plan of Care, and MDM as documented in the resident's/non-physician practitioner's note, except where noted  All available labs and Radiology studies were reviewed  I was present for key portions of any procedure(s) performed by the resident/non-physician practitioner and I was immediately available to provide assistance  At this point I agree with the current assessment done in the Emergency Department  I have conducted an independent evaluation of this patient a history and physical is as follows:    OA: 54 y/o m with h/o HTN and IDDM who presents with altered mental status  Pt was noted by his boss to be repeating his work all day  EMS was called and noted to have BS over 400 and on arrival 300  Pt himself has no complaints but wife who is at bedside notes that the pt has been confused intermittently for approximately 2 weeks  He has spoken to his PCP with negative workup thus far  Recently increased his insulin but denies other medication changes  No recent falls/illnessess/rash  NO drugs, + tobacco and occasional EtOH  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  PE, well developed m in NAD, VSS, NC/AT, MMM, PERRL, clear sclera/conjunctiva, - nystagmus, oropharynx WNL, neck supple/FROM, RR, lungs CTAB, abd soft, NT/ND, +BS, -r/g, CHAMRORO, 5/5 strength and intact sensation b/l, symmetric face, clear speech, no drift, slight difficulty with finger to nose, no difficulty wth rapid alternating movements  Well groomed  A/p altered mental status with repeatitive speech  CT head, CXR, ECG, labs u/a, Consider MRI, tox/neuro eval, monitor, treat accordingly, admit    ED Course     Consultations to both toxicologies as well as Neurology  Imaging is negative thus far  Will proceed with lumbar puncture  I personally obtained consent from family members present room including wife and daughters  Reviewed risks and benefits of procedure      Critical Care Time  Procedures

## 2019-12-06 PROBLEM — Z72.0 TOBACCO USE: Status: ACTIVE | Noted: 2019-12-06

## 2019-12-06 PROBLEM — G47.33 OSA (OBSTRUCTIVE SLEEP APNEA): Status: ACTIVE | Noted: 2019-12-06

## 2019-12-06 LAB
ALBUMIN SERPL BCP-MCNC: 3.7 G/DL (ref 3.5–5)
ALP SERPL-CCNC: 77 U/L (ref 46–116)
ALT SERPL W P-5'-P-CCNC: 23 U/L (ref 12–78)
AMMONIA PLAS-SCNC: 20 UMOL/L (ref 11–35)
AMPHETAMINES SERPL QL SCN: NEGATIVE
ANION GAP SERPL CALCULATED.3IONS-SCNC: 2 MMOL/L (ref 4–13)
AST SERPL W P-5'-P-CCNC: 10 U/L (ref 5–45)
ATRIAL RATE: 88 BPM
BACTERIA UR QL AUTO: NORMAL /HPF
BARBITURATES UR QL: NEGATIVE
BASOPHILS # BLD AUTO: 0.08 THOUSANDS/ΜL (ref 0–0.1)
BASOPHILS # BLD AUTO: 0.11 THOUSANDS/ΜL (ref 0–0.1)
BASOPHILS NFR BLD AUTO: 1 % (ref 0–1)
BASOPHILS NFR BLD AUTO: 1 % (ref 0–1)
BENZODIAZ UR QL: NEGATIVE
BILIRUB SERPL-MCNC: 0.51 MG/DL (ref 0.2–1)
BILIRUB UR QL STRIP: NEGATIVE
BUN SERPL-MCNC: 13 MG/DL (ref 5–25)
C GATTII+NEOFOR DNA CSF QL NAA+NON-PROBE: NOT DETECTED
CALCIUM SERPL-MCNC: 8.5 MG/DL (ref 8.3–10.1)
CHLORIDE SERPL-SCNC: 108 MMOL/L (ref 100–108)
CHOLEST SERPL-MCNC: 121 MG/DL (ref 50–200)
CLARITY UR: CLEAR
CMV DNA CSF QL NAA+NON-PROBE: NOT DETECTED
CO2 SERPL-SCNC: 28 MMOL/L (ref 21–32)
COCAINE UR QL: NEGATIVE
COLOR UR: YELLOW
CREAT SERPL-MCNC: 0.75 MG/DL (ref 0.6–1.3)
CRP SERPL QL: <3 MG/L
CRP SERPL QL: <3 MG/L
E COLI K1 DNA CSF QL NAA+NON-PROBE: NOT DETECTED
EOSINOPHIL # BLD AUTO: 0.25 THOUSAND/ΜL (ref 0–0.61)
EOSINOPHIL # BLD AUTO: 0.31 THOUSAND/ΜL (ref 0–0.61)
EOSINOPHIL NFR BLD AUTO: 3 % (ref 0–6)
EOSINOPHIL NFR BLD AUTO: 3 % (ref 0–6)
ERYTHROCYTE [DISTWIDTH] IN BLOOD BY AUTOMATED COUNT: 12.9 % (ref 11.6–15.1)
ERYTHROCYTE [DISTWIDTH] IN BLOOD BY AUTOMATED COUNT: 13 % (ref 11.6–15.1)
EST. AVERAGE GLUCOSE BLD GHB EST-MCNC: 169 MG/DL
EV RNA CSF QL NAA+NON-PROBE: NOT DETECTED
FERRITIN SERPL-MCNC: 163 NG/ML (ref 8–388)
FOLATE SERPL-MCNC: 19.8 NG/ML (ref 3.1–17.5)
GFR SERPL CREATININE-BSD FRML MDRD: 103 ML/MIN/1.73SQ M
GLUCOSE SERPL-MCNC: 175 MG/DL (ref 65–140)
GLUCOSE SERPL-MCNC: 220 MG/DL (ref 65–140)
GLUCOSE SERPL-MCNC: 228 MG/DL (ref 65–140)
GLUCOSE SERPL-MCNC: 284 MG/DL (ref 65–140)
GLUCOSE SERPL-MCNC: 303 MG/DL (ref 65–140)
GLUCOSE UR STRIP-MCNC: ABNORMAL MG/DL
GP B STREP DNA CSF QL NAA+NON-PROBE: NOT DETECTED
HAEM INFLU DNA CSF QL NAA+NON-PROBE: NOT DETECTED
HBA1C MFR BLD: 7.5 % (ref 4.2–6.3)
HCT VFR BLD AUTO: 41.8 % (ref 36.5–49.3)
HCT VFR BLD AUTO: 43.5 % (ref 36.5–49.3)
HDLC SERPL-MCNC: 33 MG/DL
HGB BLD-MCNC: 13.6 G/DL (ref 12–17)
HGB BLD-MCNC: 14.2 G/DL (ref 12–17)
HGB UR QL STRIP.AUTO: NEGATIVE
HHV6 DNA CSF QL NAA+NON-PROBE: NOT DETECTED
HSV1 DNA CSF QL NAA+NON-PROBE: NOT DETECTED
HSV2 DNA CSF QL NAA+NON-PROBE: NOT DETECTED
HYALINE CASTS #/AREA URNS LPF: NORMAL /LPF
IMM GRANULOCYTES # BLD AUTO: 0.02 THOUSAND/UL (ref 0–0.2)
IMM GRANULOCYTES # BLD AUTO: 0.03 THOUSAND/UL (ref 0–0.2)
IMM GRANULOCYTES NFR BLD AUTO: 0 % (ref 0–2)
IMM GRANULOCYTES NFR BLD AUTO: 0 % (ref 0–2)
IRON SATN MFR SERPL: 32 %
IRON SERPL-MCNC: 86 UG/DL (ref 65–175)
KETONES UR STRIP-MCNC: NEGATIVE MG/DL
L MONOCYTOG DNA CSF QL NAA+NON-PROBE: NOT DETECTED
LDLC SERPL CALC-MCNC: 65 MG/DL (ref 0–100)
LEAD BLD-MCNC: <1 UG/DL (ref 0–4)
LEUKOCYTE ESTERASE UR QL STRIP: ABNORMAL
LYMPHOCYTES # BLD AUTO: 2.56 THOUSANDS/ΜL (ref 0.6–4.47)
LYMPHOCYTES # BLD AUTO: 3.02 THOUSANDS/ΜL (ref 0.6–4.47)
LYMPHOCYTES NFR BLD AUTO: 26 % (ref 14–44)
LYMPHOCYTES NFR BLD AUTO: 26 % (ref 14–44)
MAGNESIUM SERPL-MCNC: 1.9 MG/DL (ref 1.6–2.6)
MCH RBC QN AUTO: 27.9 PG (ref 26.8–34.3)
MCH RBC QN AUTO: 28 PG (ref 26.8–34.3)
MCHC RBC AUTO-ENTMCNC: 32.5 G/DL (ref 31.4–37.4)
MCHC RBC AUTO-ENTMCNC: 32.6 G/DL (ref 31.4–37.4)
MCV RBC AUTO: 86 FL (ref 82–98)
MCV RBC AUTO: 86 FL (ref 82–98)
METHADONE UR QL: NEGATIVE
MONOCYTES # BLD AUTO: 0.75 THOUSAND/ΜL (ref 0.17–1.22)
MONOCYTES # BLD AUTO: 0.77 THOUSAND/ΜL (ref 0.17–1.22)
MONOCYTES NFR BLD AUTO: 7 % (ref 4–12)
MONOCYTES NFR BLD AUTO: 8 % (ref 4–12)
N MEN DNA CSF QL NAA+NON-PROBE: NOT DETECTED
NEUTROPHILS # BLD AUTO: 6.03 THOUSANDS/ΜL (ref 1.85–7.62)
NEUTROPHILS # BLD AUTO: 7.4 THOUSANDS/ΜL (ref 1.85–7.62)
NEUTS SEG NFR BLD AUTO: 62 % (ref 43–75)
NEUTS SEG NFR BLD AUTO: 63 % (ref 43–75)
NITRITE UR QL STRIP: NEGATIVE
NON-SQ EPI CELLS URNS QL MICRO: NORMAL /HPF
NRBC BLD AUTO-RTO: 0 /100 WBCS
NRBC BLD AUTO-RTO: 0 /100 WBCS
OPIATES UR QL SCN: NEGATIVE
P AXIS: 67 DEGREES
PARECHOVIRUS A RNA CSF QL NAA+NON-PROBE: NOT DETECTED
PCP UR QL: NEGATIVE
PH UR STRIP.AUTO: 7 [PH]
PHOSPHATE SERPL-MCNC: 3.5 MG/DL (ref 2.7–4.5)
PLATELET # BLD AUTO: 226 THOUSANDS/UL (ref 149–390)
PLATELET # BLD AUTO: 250 THOUSANDS/UL (ref 149–390)
PMV BLD AUTO: 10.3 FL (ref 8.9–12.7)
PMV BLD AUTO: 10.5 FL (ref 8.9–12.7)
POTASSIUM SERPL-SCNC: 3.9 MMOL/L (ref 3.5–5.3)
PR INTERVAL: 164 MS
PROT SERPL-MCNC: 6.4 G/DL (ref 6.4–8.2)
PROT UR STRIP-MCNC: NEGATIVE MG/DL
QRS AXIS: -80 DEGREES
QRSD INTERVAL: 118 MS
QT INTERVAL: 364 MS
QTC INTERVAL: 440 MS
RBC # BLD AUTO: 4.85 MILLION/UL (ref 3.88–5.62)
RBC # BLD AUTO: 5.09 MILLION/UL (ref 3.88–5.62)
RBC #/AREA URNS AUTO: NORMAL /HPF
S PNEUM DNA CSF QL NAA+NON-PROBE: NOT DETECTED
SODIUM SERPL-SCNC: 138 MMOL/L (ref 136–145)
SP GR UR STRIP.AUTO: 1.03 (ref 1–1.03)
T WAVE AXIS: 72 DEGREES
THC UR QL: NEGATIVE
TIBC SERPL-MCNC: 267 UG/DL (ref 250–450)
TRIGL SERPL-MCNC: 113 MG/DL
TSH SERPL DL<=0.05 MIU/L-ACNC: 2.21 UIU/ML (ref 0.36–3.74)
UROBILINOGEN UR QL STRIP.AUTO: 0.2 E.U./DL
VENTRICULAR RATE: 88 BPM
VIT B12 SERPL-MCNC: 498 PG/ML (ref 100–900)
VZV DNA CSF QL NAA+NON-PROBE: NOT DETECTED
WBC # BLD AUTO: 11.64 THOUSAND/UL (ref 4.31–10.16)
WBC # BLD AUTO: 9.69 THOUSAND/UL (ref 4.31–10.16)
WBC #/AREA URNS AUTO: NORMAL /HPF

## 2019-12-06 PROCEDURE — 83655 ASSAY OF LEAD: CPT | Performed by: PSYCHIATRY & NEUROLOGY

## 2019-12-06 PROCEDURE — 0035U NEURO CSF PRION PRTN QUAL: CPT

## 2019-12-06 PROCEDURE — 83825 ASSAY OF MERCURY: CPT | Performed by: PSYCHIATRY & NEUROLOGY

## 2019-12-06 PROCEDURE — 93010 ELECTROCARDIOGRAM REPORT: CPT | Performed by: INTERNAL MEDICINE

## 2019-12-06 PROCEDURE — 83550 IRON BINDING TEST: CPT | Performed by: INTERNAL MEDICINE

## 2019-12-06 PROCEDURE — 84443 ASSAY THYROID STIM HORMONE: CPT | Performed by: INTERNAL MEDICINE

## 2019-12-06 PROCEDURE — 86317 IMMUNOASSAY INFECTIOUS AGENT: CPT

## 2019-12-06 PROCEDURE — 80307 DRUG TEST PRSMV CHEM ANLYZR: CPT | Performed by: NURSE PRACTITIONER

## 2019-12-06 PROCEDURE — 82948 REAGENT STRIP/BLOOD GLUCOSE: CPT

## 2019-12-06 PROCEDURE — 82300 ASSAY OF CADMIUM: CPT | Performed by: PSYCHIATRY & NEUROLOGY

## 2019-12-06 PROCEDURE — 80061 LIPID PANEL: CPT | Performed by: INTERNAL MEDICINE

## 2019-12-06 PROCEDURE — 86140 C-REACTIVE PROTEIN: CPT | Performed by: PSYCHIATRY & NEUROLOGY

## 2019-12-06 PROCEDURE — 82728 ASSAY OF FERRITIN: CPT | Performed by: INTERNAL MEDICINE

## 2019-12-06 PROCEDURE — 82175 ASSAY OF ARSENIC: CPT | Performed by: PSYCHIATRY & NEUROLOGY

## 2019-12-06 PROCEDURE — 81001 URINALYSIS AUTO W/SCOPE: CPT | Performed by: INTERNAL MEDICINE

## 2019-12-06 PROCEDURE — 80053 COMPREHEN METABOLIC PANEL: CPT | Performed by: INTERNAL MEDICINE

## 2019-12-06 PROCEDURE — 84100 ASSAY OF PHOSPHORUS: CPT | Performed by: INTERNAL MEDICINE

## 2019-12-06 PROCEDURE — 99253 IP/OBS CNSLTJ NEW/EST LOW 45: CPT | Performed by: PSYCHIATRY & NEUROLOGY

## 2019-12-06 PROCEDURE — 99232 SBSQ HOSP IP/OBS MODERATE 35: CPT | Performed by: NURSE PRACTITIONER

## 2019-12-06 PROCEDURE — 85025 COMPLETE CBC W/AUTO DIFF WBC: CPT | Performed by: PSYCHIATRY & NEUROLOGY

## 2019-12-06 PROCEDURE — 84166 PROTEIN E-PHORESIS/URINE/CSF: CPT | Performed by: PATHOLOGY

## 2019-12-06 PROCEDURE — 85025 COMPLETE CBC W/AUTO DIFF WBC: CPT | Performed by: INTERNAL MEDICINE

## 2019-12-06 PROCEDURE — 82140 ASSAY OF AMMONIA: CPT | Performed by: NURSE PRACTITIONER

## 2019-12-06 PROCEDURE — 99223 1ST HOSP IP/OBS HIGH 75: CPT | Performed by: INTERNAL MEDICINE

## 2019-12-06 PROCEDURE — 83735 ASSAY OF MAGNESIUM: CPT | Performed by: INTERNAL MEDICINE

## 2019-12-06 PROCEDURE — 84165 PROTEIN E-PHORESIS SERUM: CPT | Performed by: PATHOLOGY

## 2019-12-06 PROCEDURE — 86592 SYPHILIS TEST NON-TREP QUAL: CPT | Performed by: PSYCHIATRY & NEUROLOGY

## 2019-12-06 PROCEDURE — 84165 PROTEIN E-PHORESIS SERUM: CPT | Performed by: PSYCHIATRY & NEUROLOGY

## 2019-12-06 PROCEDURE — 84182 PROTEIN WESTERN BLOT TEST: CPT | Performed by: PSYCHIATRY & NEUROLOGY

## 2019-12-06 PROCEDURE — 36415 COLL VENOUS BLD VENIPUNCTURE: CPT | Performed by: INTERNAL MEDICINE

## 2019-12-06 PROCEDURE — 83540 ASSAY OF IRON: CPT | Performed by: INTERNAL MEDICINE

## 2019-12-06 RX ADMIN — INSULIN GLARGINE 30 UNITS: 100 INJECTION, SOLUTION SUBCUTANEOUS at 17:14

## 2019-12-06 RX ADMIN — INSULIN LISPRO 4 UNITS: 100 INJECTION, SOLUTION INTRAVENOUS; SUBCUTANEOUS at 17:13

## 2019-12-06 RX ADMIN — INSULIN LISPRO 2 UNITS: 100 INJECTION, SOLUTION INTRAVENOUS; SUBCUTANEOUS at 21:16

## 2019-12-06 RX ADMIN — ATORVASTATIN CALCIUM 40 MG: 40 TABLET, FILM COATED ORAL at 17:14

## 2019-12-06 RX ADMIN — LOSARTAN POTASSIUM 100 MG: 50 TABLET, FILM COATED ORAL at 08:36

## 2019-12-06 RX ADMIN — INSULIN LISPRO 2 UNITS: 100 INJECTION, SOLUTION INTRAVENOUS; SUBCUTANEOUS at 12:23

## 2019-12-06 RX ADMIN — FENOFIBRATE 145 MG: 145 TABLET, FILM COATED ORAL at 08:37

## 2019-12-06 RX ADMIN — ENOXAPARIN SODIUM 40 MG: 40 INJECTION SUBCUTANEOUS at 09:21

## 2019-12-06 RX ADMIN — ASPIRIN 81 MG: 81 TABLET ORAL at 08:37

## 2019-12-06 RX ADMIN — INSULIN LISPRO 4 UNITS: 100 INJECTION, SOLUTION INTRAVENOUS; SUBCUTANEOUS at 07:42

## 2019-12-06 RX ADMIN — PANTOPRAZOLE SODIUM 40 MG: 40 TABLET, DELAYED RELEASE ORAL at 06:34

## 2019-12-06 RX ADMIN — INSULIN GLARGINE 30 UNITS: 100 INJECTION, SOLUTION SUBCUTANEOUS at 08:38

## 2019-12-06 RX ADMIN — ACETAMINOPHEN 650 MG: 325 TABLET ORAL at 08:09

## 2019-12-06 RX ADMIN — NICOTINE POLACRILEX 2 MG: 2 GUM, CHEWING BUCCAL at 15:36

## 2019-12-06 NOTE — ED NOTES
Informed by pharmacy that subcutaneous victoza injection is not available  Patient to be informed he will have to bring it from home        Jesus Espino RN  12/06/19 8557

## 2019-12-06 NOTE — H&P
H&P- Brandy Gacria 1965, 53 y o  male MRN: [id number]    Unit/Bed#: ED 08 Encounter: [id number]    Primary Care Provider: Savannah Mccray MD   Date and time admitted to hospital: 12/5/2019  3:36 PM        * Adjustment disorder with withdrawal  Assessment & Plan  Although patient will continue to see Neurology, MRI has been done and is finally read as normal; would like to get Psychiatry opinion regarding the possibility of adjustment disorder with withdrawal behavior  Meanwhile as per emergency room physicians, Neurology has recommended CSF studies  Initially acyclovir was ordered however currently placed on hold upon discussion between medicine and ER; MRI being normal in so far CSF studies are coming back within normal limits  Elevated glucose most likely secondary to diabetes  Disorientation  Assessment & Plan  Bending Neurology and Psychiatry evaluations  Mixed hyperlipidemia  Assessment & Plan  On fenofibrate and Crestor/Lipitor  Essential hypertension  Assessment & Plan  Continued Cozaar  Type 2 diabetes mellitus without complication, with long-term current use of insulin [facility])  Assessment & Plan  No results found for: HGBA1C    Recent Labs     12/05/19  1539 12/05/19  2206   POCGLU 319* 145*     Will continue with Lantus, Liraglutide and insulin sliding scale  Hemoglobin A1c  With on hold metformin as patient just had CT scan with contrast     Blood Sugar Average: Last 72 hrs:  (P) 232          VTE Prophylaxis: Pharmacologic VTE Prophylaxis contraindicated due to Status post lumbar puncture  / sequential compression device   Code Status: Level 1 - Full Code as discussed with family  POLST: There is no POLST form on file for this patient (pre-hospital)    Anticipated Length of Stay:  Patient will be admitted on an Inpatient basis with an anticipated length of stay of  greater than 2 midnights  Justification for Hospital Stay: Please see detailed plans noted above      Chief Complaint: Disorientation  History of Present Illness:  Fausto Gloria is a 53 y o  male who has a past medical history significant for essential hypertension, mixed hyperlipidemia, diabetes mellitus type 2 with insulin use and on metformin and Victoza  According to family, the patient does not have any other chronic diseases or debilitating diseases  No history of strokes in the past   Baseline wise, the patient is a very jovial individual who 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  Within the past 2 or 3 months the patient has been undergoing a lot of stress including selling their home which according to the family is quite difficult to maintain  Likewise there is some financial difficulty going on  The family has noted that about a month ago and possibly even longer; he has been having some periods of disorientation or zoning out  Specifically, the patient certain times would ask what's going on and not knowing where he is  He would also be forgetful for most part  Insomnia  Good appetite  Noted in the room, patient has poor eye contact to the examiner  No blackouts  No loss of consciousness  No fever  No cough or cold or sick contacts  Noted is that the patient with the family, went on a vacation to [facility]  No history of tick bites  However, the patient has seen PCP and that CTs of the head was done x3 which were negative  MRI has not been done until tonight due to claustrophobia  (MRI was able to be done after Ativan 1 mg intravenous x2 )        Review of Systems:    Constitutional:  Denies fever or chills   Eyes:  Denies change in visual acuity   HENT:  Denies nasal congestion or sore throat   Respiratory:  Denies cough or shortness of breath   Cardiovascular:  Denies chest pain or edema   GI:  Denies abdominal pain, nausea, vomiting, bloody stools or diarrhea   :  Denies dysuria   Musculoskeletal:  Denies back pain or joint pain   Integument:  Denies rash Neurologic:  Denies headache, focal weakness or sensory changes   Endocrine:  Denies polyuria or polydipsia   Lymphatic:  Denies swollen glands   Psychiatric:  Denies depression or anxiety     Past Medical and Surgical History:   Past Medical History:   Diagnosis Date    Diabetes mellitus ([address] )     GERD (gastroesophageal reflux disease)     Hyperlipidemia     Hypertension      History reviewed  No pertinent surgical history  Meds/Allergies:    (Not in a hospital admission)  aspirin (ECOTRIN LOW STRENGTH) 81 mg EC tablet Take 81 mg by mouth daily Milka Krishna MD Reordered   Ordered as: aspirin (ECOTRIN LOW STRENGTH) EC tablet 81 mg - 81 mg, Oral, Daily, First dose on Fri 12/6/19 at 0900 Do Not Crush - Enteric coated  fenofibrate (TRICOR) 145 mg tablet Take 145 mg by mouth daily Milka Krishna MD Reordered   Ordered as: fenofibrate (TRICOR) tablet 145 mg - 145 mg, Oral, Daily, First dose on Fri 12/6/19 at 0900 Swallow whole; do not crush, chew or split  LOOK ALIKE SOUND ALIKE MED    insulin glargine (LANTUS) 100 units/mL subcutaneous injection Inject 30 Units under the skin 2 (two) times a day Milka Krishna MD Reordered   Ordered as: insulin glargine (LANTUS) subcutaneous injection 30 Units 0 3 mL - 30 Units, Subcutaneous, 2 times daily, First dose on Fri 12/6/19 at 0900 [facility]  Do not dilute/mix insulin glargine with any other insulin formulation/solution  **DISPOSE IN 8 GALLON BLACK CONTAINER** Do not hold medication without a physician order      irbesartan (AVAPRO) 300 mg tablet Take 300 mg by mouth daily Milka Krishna MD Reordered   Ordered as: losartan (COZAAR) tablet 100 mg - 100 mg, Oral, Daily, First dose on Fri 12/6/19 at 9135 Hold for systolic blood pressure less than (mmHg): 110   lansoprazole (PREVACID) 15 mg capsule Take 15 mg by mouth daily Milka Krishna MD Reordered   Ordered as: pantoprazole (PROTONIX) EC tablet 40 mg - 40 mg, Oral, Daily (early morning), First dose on Fri 12/6/19 at 0600 Swallow whole; do not crush, chew or split  LOOK ALIKE SOUND ALIKE MED    Liraglutide (VICTOZA SC) Inject 1 8 mcg under the skin daily Germania Gonzalez MD Reordered   Ordered as: liraglutide (VICTOZA) injection 1 8 mg - 1 8 mg, Subcutaneous, Daily, First dose on Fri 12/6/19 at 0900   metFORMIN (GLUCOPHAGE) 1000 MG tablet Take 1,000 mg by mouth 2 (two) times a day Germania Gonzalez MD Not Ordered   rosuvastatin (CRESTOR) 20 MG tablet Take 20 mg by mouth daily Germania Gonzalez MD Reordered   Ordered as: atorvastatin (LIPITOR) tablet 40 mg - 40 mg, Oral, Daily with dinner, First dose on Fri 12/6/19 at 1630       Allergies: Allergies   Allergen Reactions    Strawberry Extract Other (See Comments)     History:  Marital Status: /Civil Union   Occupation: manages construction  Patient Pre-hospital Living Situation: home - in apartment after the house was sold  Patient Pre-hospital Level of Mobility: ambulatory  Patient Pre-hospital Diet Restrictions: diabteic  Substance Use History:   Social History     Substance and Sexual Activity   Alcohol Use Yes    Frequency: Never     Social History     Tobacco Use   Smoking Status Current Every Day Smoker    Packs/day: 0 50    Types: Cigarettes   Smokeless Tobacco Current User     Social History     Substance and Sexual Activity   Drug Use Never       Family History:  History reviewed  No pertinent family history  Physical Exam:     Vitals:   Blood Pressure: 120/70 (12/05/19 2345)  Pulse: 80 (12/05/19 2345)  Temperature: 98 1 °F (36 7 °C) (12/05/19 1621)  Temp Source: Oral (12/05/19 1621)  Respirations: 16 (12/05/19 2345)  SpO2: 95 % (12/05/19 2345)    Constitutional:  Well developed, well nourished, no acute distress, non-toxic appearance ; noted patient has poor eye contact and looks either way when spoken to    Eyes:  PERRL, conjunctiva normal   HENT:  Atraumatic, external ears normal, nose normal, oropharynx moist, no pharyngeal exudates  Neck- normal range of motion, no tenderness, supple   Respiratory:  No respiratory distress, normal breath sounds, no rales, no wheezing   Cardiovascular:  Normal rate, normal rhythm, no murmurs, no gallops, no rubs   GI:  Soft, nondistended, normal bowel sounds, nontender, no organomegaly, no mass, no rebound, no guarding   :  No costovertebral angle tenderness   Musculoskeletal:  No edema, no tenderness, no deformities  Back- no tenderness  Integument:  Well hydrated, no rash   Lymphatic:  No lymphadenopathy noted   Neurologic:  Alert &awake, does not communicate in the depths family members do the talking  CN 2-12 normal, normal motor function, normal sensory function, no focal deficits noted ; although patient does not actively follow commands will withdrawal to painful stimulus  Equal strength in all 4 extremities  Psychiatric:  Speech and behavior silent  Scant words  Poor eye contact  Flat affect  Lab Results: I have personally reviewed pertinent reports  Results from last 7 days   Lab Units 12/05/19  1553   WBC Thousand/uL 11 15*   HEMOGLOBIN g/dL 14 3   HEMATOCRIT % 43 9   PLATELETS Thousands/uL 249   NEUTROS PCT % 71   LYMPHS PCT % 19   MONOS PCT % 7   EOS PCT % 2     Results from last 7 days   Lab Units 12/05/19  1556   POTASSIUM mmol/L 4 9   CHLORIDE mmol/L 101   CO2 mmol/L 29   BUN mg/dL 16   CREATININE mg/dL 0 99   CALCIUM mg/dL 9 9   ALK PHOS U/L 102   ALT U/L 28   AST U/L 12           CSF protein 45; RBC tube 1 44; RBC tube 2 2; glucose 143; CSF appearance clear, WBC O   Imaging: I have personally reviewed pertinent reports  Cta Head With And Without Contrast    Result Date: 12/5/2019  Narrative: CTA BRAIN - WITH AND WITHOUT CONTRAST INDICATION: Altered mental status  Diabetes  COMPARISON:   12/5/2019  TECHNIQUE:  axial 0 625 mm imaging after administration of intravenous contrast   MIP and 3D reconstructions performed    3D rendering was performed on an independent workstation  Radiation dose length product (DLP) for this visit:  168 mGy-cm   This examination, like all CT scans performed in the [facility], was performed utilizing techniques to minimize radiation dose exposure, including the use of iterative reconstruction and automated exposure control  IV Contrast:  85 mL of iohexol (OMNIPAQUE)  IMAGE QUALITY:  Diagnostic  FINDINGS: VASCULATURE: DISTAL INTERNAL CAROTID ARTERIES:  Normal enhancement of the distal cervical internal carotid arteries and intracranial portions of the internal carotid arteries  Normal ophthalmic artery origins  Normal ICA terminus  ANTERIOR CIRCULATION:  Symmetric A1 segments and anterior cerebral arteries with normal enhancement  Normal anterior communicating artery  MIDDLE CEREBRAL ARTERY CIRCULATION:  M1 segment and middle cerebral artery branches demonstrate normal enhancement bilaterally  DISTAL VERTEBRAL ARTERIES:  Normal distal vertebral arteries  Posterior inferior cerebellar artery origins are normal  Normal vertebral basilar junction  BASILAR ARTERY:  Basilar artery is normal in caliber  Normal superior cerebellar arteries  POSTERIOR CEREBRAL ARTERIES: Both posterior cerebral arteries arises from the basilar tip  Both arteries demonstrate normal enhancement  Left posterior communicating artery identified  DURAL VENOUS SINUSES:  Patent  BONY STRUCTURES:  No lytic or blastic lesion  Impression: No hemodynamically significant stenosis or occlusion of the major vessels of the [place]  Workstation performed: [id number]     Ct Head Without Contrast    Result Date: 12/5/2019  Narrative: CT BRAIN - WITHOUT CONTRAST INDICATION:   Confusion, altered mental status  COMPARISON:  None  TECHNIQUE:  CT examination of the brain was performed  In addition to axial images, coronal 2D reformatted images were created and submitted for interpretation    Radiation dose length product (DLP) for this visit:  901 5 mGy-cm    This examination, like all CT scans performed in the [facility], was performed utilizing techniques to minimize radiation dose exposure, including the use of iterative reconstruction and automated exposure control  IMAGE QUALITY:  Diagnostic  FINDINGS: PARENCHYMA:  No intracranial mass, mass effect or midline shift  No CT signs of acute infarction  No acute parenchymal hemorrhage  VENTRICLES AND EXTRA-AXIAL SPACES:  Normal for the patient's age  VISUALIZED ORBITS AND PARANASAL SINUSES:  No acute abnormality involving the orbits  Scattered sinus mucosal thickening is noted  No fluid levels are seen  CALVARIUM AND EXTRACRANIAL SOFT TISSUES:  Normal      Impression: No acute intracranial abnormality  Workstation performed: [id number]     Mri Brain Wo Contrast    Result Date: 12/5/2019  Narrative: MRI BRAIN WITHOUT CONTRAST INDICATION: Altered mental status  COMPARISON:   12/5/2019  TECHNIQUE:  Sagittal T1, axial T2, axial FLAIR, axial T1, axial [place] and axial diffusion imaging  IMAGE QUALITY:  Diagnostic  FINDINGS: BRAIN PARENCHYMA: No restricted diffusion  No white matter lesions  No intracranial hemorrhage or mass effect  VENTRICLES:  No hydrocephalus or extra-axial collection  SELLA AND PITUITARY GLAND:  Normal  ORBITS:  Normal  PARANASAL SINUSES:  Mucosal thickening throughout the paranasal sinuses  VASCULATURE:  Evaluation of the major intracranial vasculature demonstrates appropriate flow voids  CALVARIUM AND SKULL BASE:  No evidence of osseous lesion  EXTRACRANIAL SOFT TISSUES:  No soft tissue mass  Impression: 1  No acute intracranial process  2   Paranasal sinus disease  Workstation performed: [id number]         ** Please Note: Dragon 360 Dictation voice to text software was used in the creation of this document   **

## 2019-12-06 NOTE — PROGRESS NOTES
[facility]    Progress Note - Wiley Mcleod 1965, 53 y o  male MRN: [id number]    Unit/Bed#: ED 08 Encounter: [id number]    Primary Care Provider: Travis Esparza MD   Date and time admitted to hospital: 12/5/2019  3:36 PM    * Disorientation  Assessment & Plan  · Intermittent, reportedly having episodes of "zoning out" while at work and performing daily activities as well as confusion and disorientation  Multiple episodes over past 2 weeks per wife  Unclear etiology  Patient currently alert and oriented x 3    · CT head, CTA head and neck, MRI of brain all unremarkable  · Status post lumbar puncture and everything at this point is negative  CSF culture remains pending  · Denies any new medications, elicit drug use  · Check UDS  · Check ammonia level  · Folate and B12 normal    · Await neuro and psychiatry input  · Neuro checks     Essential hypertension  Assessment & Plan  · Continued Cozaar substitute patient's home dose of Avapro  Type 2 diabetes mellitus without complication, with long-term current use of insulin [facility])  Assessment & Plan  No results found for: HGBA1C    Recent Labs     12/05/19  1539 12/05/19  2206 12/06/19  0716   POCGLU 319* 145* 303*     · With hyperglycemia  Hemoglobin A1c pending  · Continue home dose of Lantus 30 units BID  Will also continue home dose of Victoza  Continue hold metformin  · Continue sliding scale    · Diabetic diet  · Monitor Accu-Cheks and adjust regimen as needed  · Outpatient f/u with endocrinology at [facility]      Mixed hyperlipidemia  Assessment & Plan  · On fenofibrate and statin    Tobacco use  Assessment & Plan  · NRT  · Encouraged cessation    EILEEN (obstructive sleep apnea)  Assessment & Plan  · Self-discontinued CPAP approximately 1 year ago    · Wife has been attempting to arrange repeat sleep study as outpatient    Pharmacologic: Enoxaparin (Lovenox)  Mechanical VTE Prophylaxis in Place: Yes    Patient Centered Rounds: I have performed bedside rounds with nursing staff today  Discussions with Specialists or Other Care Team Provider: nursing     Education and Discussions with Family / Patient: patient and wife at bedside     Time Spent for Care: 30 minutes  More than 50% of total time spent on counseling and coordination of care as described above  Current Length of Stay: 1 day(s)    Current Patient Status: Inpatient   Certification Statement: The patient will continue to require additional inpatient hospital stay due to await psychiatry and neurology input    Discharge Plan / Estimated Discharge Date: not medically stable, await above consultant opinions  Code Status: Level 1 - Full Code      Subjective:   Patient offers no complaints  He vaguely remembers episode from yesterday  Wife states that he was unable to remember any of it but that now, the events of yesterday is slowly coming back to him  He is currently alert and oriented x3 however his wife feels he seems "a bit off " Patient denies any fevers, chills, any headaches, n/v/d  No new medications  Denies illicit drug use  He does endorse some low back pain which is chronic secondary to his job which involves a lot of heavy lifting  Objective:     Vitals:   Temp (24hrs), Av 1 °F (36 7 °C), Min:98 1 °F (36 7 °C), Max:98 1 °F (36 7 °C)    Temp:  [98 1 °F (36 7 °C)] 98 1 °F (36 7 °C)  HR:  [68-89] 75  Resp:  [15-20] 18  BP: (109-152)/(57-86) 128/74  SpO2:  [94 %-97 %] 95 %  Body mass index is 29 01 kg/m²  Input and Output Summary (last 24 hours): Intake/Output Summary (Last 24 hours) at 2019 0902  Last data filed at 2019 2225  Gross per 24 hour   Intake 800 ml   Output 400 ml   Net 400 ml       Physical Exam:     Physical Exam   Constitutional: He is oriented to person, place, and time  No distress  HENT:   Head: Normocephalic  Eyes: Conjunctivae are normal    Neck: Normal range of motion  Cardiovascular: Normal rate     Pulmonary/Chest: Breath sounds normal    Abdominal: Bowel sounds are normal    Musculoskeletal: Normal range of motion  He exhibits no edema  Neurological: He is alert and oriented to person, place, and time  forgetful   Skin: Skin is warm and dry  Psychiatric: He has a normal mood and affect  Nursing note and vitals reviewed        Additional Data:     Labs:    Results from last 7 days   Lab Units 12/06/19  0422   WBC Thousand/uL 9 69   HEMOGLOBIN g/dL 13 6   HEMATOCRIT % 41 8   PLATELETS Thousands/uL 226   NEUTROS PCT % 62   LYMPHS PCT % 26   MONOS PCT % 8   EOS PCT % 3     Results from last 7 days   Lab Units 12/06/19  0422   POTASSIUM mmol/L 3 9   CHLORIDE mmol/L 108   CO2 mmol/L 28   BUN mg/dL 13   CREATININE mg/dL 0 75   CALCIUM mg/dL 8 5   ALK PHOS U/L 77   ALT U/L 23   AST U/L 10             Recent Cultures (last 7 days):     Results from last 7 days   Lab Units 12/05/19  2207   GRAM STAIN RESULT  No bacteria seen  No polys seen       Last 24 Hours Medication List:     Current Facility-Administered Medications:  acetaminophen 650 mg Oral Q4H PRN Swapnil De La Cruz MD   aluminum-magnesium hydroxide-simethicone 30 mL Oral Q6H PRN Swapnil De La Cruz MD   aspirin 81 mg Oral Daily Swapnil De La Cruz MD   atorvastatin 40 mg Oral Daily With Antonieta Mcclendon MD   docusate sodium 100 mg Oral BID PRN Swapnil De La Cruz MD   enoxaparin 40 mg Subcutaneous Q24H [address] EULA Prasad   fenofibrate 145 mg Oral Daily Swapnil De La Cruz MD   insulin glargine 30 Units Subcutaneous BID Swapnil De La Cruz MD   insulin lispro 1-5 Units Subcutaneous HS Swapnil De La Cruz MD   insulin lispro 1-6 Units Subcutaneous TID AC Swapnil De La Cruz MD   liraglutide 1 8 mg Subcutaneous Daily Swapnil De La Cruz MD   losartan 100 mg Oral Daily Swapnil De La Cruz MD   nicotine 1 patch Transdermal Daily Swapnil De La Cruz MD   ondansetron 4 mg Intravenous Q6H PRN Swapnil De La Cruz MD   pantoprazole 40 mg Oral Early Morning Swapnil De La Cruz MD        Today, Patient Was Seen By: EULA Pardo    ** Please Note: Dragon 360 Dictation voice to text software may have been used in the creation of this document   **

## 2019-12-06 NOTE — ED NOTES
Received call from MRI tech that patient became combative, attempting to jump off of MRI table and pushing wife and MRI staff  1 mg Ativan to be ordered by Dr Tania Schilder Cindra Quay, RINA  12/05/19 5039

## 2019-12-06 NOTE — CONSULTS
Consultation - Neurology   Marquis Perla 53 y o  male MRN: [id number]  [facility] Encounter: [id number]      Assessment/Plan   Assessment:  Patient is a 53year old male presenting with disorientation and changes in mental status  No significant cause found on imaging, csf studies pending  Dementia vs Psych related? Plan:  -Unclear etiology at this time   -EEG, although less likely underlying seizure disorder; rule out possible seizure disorder  -CSF studies pending  -SPEP, UPEP, Heavy metal screen, RPR, Sed rate, CRP- ordered and pending  -Lab workup for underlying causes of dementia to rule out any organic cause  -MRI brain and CT head and CTA H&N: unremarkable  -Folate and Vit B12 WNL  -TSH levels: WNL  -Ammonia levels: WNL  -Rapid Drug screen: negative  -Continue Neuro checks  -    History of Present Illness     Reason for Consult / Principal Problem: Disorientation,  Confusion   Hx and PE limited by: N/A  HPI: Marquis Perla is a 53 y o   male with a past medical history significant for HTN, HLD, DM II, no prior neurological or psychiatric history who presents with disorientation and confusion for the last 1 month  Patient has been having diffciulty with recall or events and 3-4 times during the week  According to family he has a waxing and waning memory loss  Patient states he does not remember how he gets to work and periods of his day he can not recall the events  He reports feeling like he is in a dream, with family reporting, the events he recalls as a dream and actual life events  During interview with the patient, he reported a good description of his occupation and his everyday activities   He denies any recent stressors although wife reports patient lost his brother in the recent past  She also reports patient had a similar episode of confusion 2 years prior but that he was back to baseline since          Inpatient consult to Neurology     Date/Time 12/6/2019 3:54 PM     Performed by Mario Martinez MD     Authorized by Ramesh Matos MD              Review of Systems   Constitutional: Positive for activity change and fatigue  Negative for appetite change and chills  Respiratory: Negative for chest tightness and shortness of breath  Cardiovascular: Negative for chest pain, palpitations and leg swelling  Musculoskeletal: Negative  Neurological: Negative for dizziness, seizures, syncope, speech difficulty, weakness, light-headedness, numbness and headaches  Psychiatric/Behavioral: Positive for confusion and sleep disturbance  Negative for agitation and hallucinations  Historical Information   Past Medical History:   Diagnosis Date    Diabetes mellitus ([facility] [address] )     GERD (gastroesophageal reflux disease)     Hyperlipidemia     Hypertension      History reviewed  No pertinent surgical history  Social History   Social History     Substance and Sexual Activity   Alcohol Use Yes    Frequency: Never     Social History     Substance and Sexual Activity   Drug Use Never     Social History     Tobacco Use   Smoking Status Current Every Day Smoker    Packs/day: 0 50    Types: Cigarettes   Smokeless Tobacco Current User     Family History: History reviewed  No pertinent family history  Review of previous medical records was completed       Meds/Allergies   all current active meds have been reviewed and current meds:   Current Facility-Administered Medications   Medication Dose Route Frequency    acetaminophen (TYLENOL) tablet 650 mg  650 mg Oral Q4H PRN    aluminum-magnesium hydroxide-simethicone (MYLANTA) 200-200-20 mg/5 mL oral suspension 30 mL  30 mL Oral Q6H PRN    aspirin (ECOTRIN LOW STRENGTH) EC tablet 81 mg  81 mg Oral Daily    atorvastatin (LIPITOR) tablet 40 mg  40 mg Oral Daily With Dinner    docusate sodium (COLACE) capsule 100 mg  100 mg Oral BID PRN    enoxaparin (LOVENOX) subcutaneous injection 40 mg  40 mg Subcutaneous Q24H [facility]    fenofibrate (TRICOR) tablet 145 mg  145 mg Oral Daily    insulin glargine (LANTUS) subcutaneous injection 30 Units 0 3 mL  30 Units Subcutaneous BID    insulin lispro (HumaLOG) 100 units/mL subcutaneous injection 1-5 Units  1-5 Units Subcutaneous HS    insulin lispro (HumaLOG) 100 units/mL subcutaneous injection 1-6 Units  1-6 Units Subcutaneous TID AC    liraglutide (VICTOZA) injection 1 8 mg  1 8 mg Subcutaneous Daily    losartan (COZAAR) tablet 100 mg  100 mg Oral Daily    nicotine polacrilex (NICORETTE) gum 2 mg  2 mg Oral Q2H PRN    ondansetron (ZOFRAN) injection 4 mg  4 mg Intravenous Q6H PRN    pantoprazole (PROTONIX) EC tablet 40 mg  40 mg Oral Early Morning       Allergies   Allergen Reactions    Strawberry Extract Other (See Comments)       Objective   Vitals:Blood pressure 131/77, pulse 80, temperature 98 3 °F (36 8 °C), resp  rate 18, height 5' 11" (1 803 m), weight 94 3 kg (208 lb), SpO2 95 %  ,Body mass index is 29 01 kg/m²  Intake/Output Summary (Last 24 hours) at 12/6/2019 1554  Last data filed at 12/6/2019 1134  Gross per 24 hour   Intake 1400 ml   Output 1050 ml   Net 350 ml       Invasive Devices: Invasive Devices     Peripheral Intravenous Line            Peripheral IV 12/05/19 Right Antecubital 1 day                Physical Exam   Constitutional: He is oriented to person, place, and time  He appears well-developed and well-nourished  HENT:   Head: Normocephalic  Eyes: Pupils are equal, round, and reactive to light  Conjunctivae and EOM are normal    Neck: Normal range of motion  Cardiovascular: Normal rate  Pulmonary/Chest: Breath sounds normal    Abdominal: Soft  Bowel sounds are normal    Musculoskeletal: He exhibits no edema  Neurological: He is oriented to person, place, and time  He has normal strength  He has a normal Finger-Nose-Finger Test  Gait normal    Reflex Scores:       Bicep reflexes are 3+ on the right side and 3+ on the left side         Patellar reflexes are 3+ on the right side and 3+ on the left side  Skin: Skin is warm and dry  Psychiatric: His speech is normal    Nursing note and vitals reviewed  Neurologic Exam     Mental Status   Oriented to person, place, and time  Recall at 5 minutes: recalls 1 of 3 objects  Follows 3 step commands  Attention: decreased  Concentration: decreased  Speech: speech is normal   Level of consciousness: alert  Able to name object  Able to read  Able to repeat  Normal comprehension  Cranial Nerves   Cranial nerves II through XII intact  CN III, IV, VI   Pupils are equal, round, and reactive to light  Extraocular motions are normal      Motor Exam   Muscle bulk: normal  Overall muscle tone: normal  Right arm pronator drift: absent  Left arm pronator drift: absent  Right leg tone: normal  Left leg tone: normal    Strength   Strength 5/5 throughout  Sensory Exam   Light touch normal    Vibration normal    Right arm proprioception: decreased from fingers  Left arm proprioception: decreased from fingers  Right leg proprioception: decreased from toes  Left leg proprioception: decreased from toes  Right arm pinprick: decreased from elbow  Left arm pinprick: decreased from elbow  Right leg pinprick: decreased from knee  Left leg pinprick: decreased from knee    Gait, Coordination, and Reflexes     Gait  Gait: normal    Coordination   Finger to nose coordination: normal    Reflexes   Reflexes 2+ except as noted  Right biceps: 3+  Left biceps: 3+  Right patellar: 3+  Left patellar: 3+  Right plantar: equivocal  Left plantar: equivocal      Lab Results:   I have personally reviewed pertinent reports    , CBC:   Results from last 7 days   Lab Units 12/06/19  0422 12/05/19  1553   WBC Thousand/uL 9 69 11 15*   RBC Million/uL 4 85 5 07   HEMOGLOBIN g/dL 13 6 14 3   HEMATOCRIT % 41 8 43 9   MCV fL 86 87   PLATELETS Thousands/uL 226 249   , BMP/CMP:   Results from last 7 days   Lab Units 12/06/19  0422 12/05/19  1556   SODIUM mmol/L 138 137   POTASSIUM mmol/L 3 9 4 9   CHLORIDE mmol/L 108 101   CO2 mmol/L 28 29   BUN mg/dL 13 16   CREATININE mg/dL 0 75 0 99   CALCIUM mg/dL 8 5 9 9   AST U/L 10 12   ALT U/L 23 28   ALK PHOS U/L 77 102   EGFR ml/min/1 73sq m 103 85     Imaging Studies: I have personally reviewed pertinent reports  and I have personally reviewed pertinent films in PACS  EKG, Pathology, and Other Studies: I have personally reviewed pertinent reports     and I have personally reviewed pertinent films in PACS  VTE Prophylaxis: Sequential compression device Aidan Casillas     Code Status: Level 1 - Full Code  Advance Directive and Living Will:      Power of :    POLST:      Counseling / Coordination of Care  N/A

## 2019-12-06 NOTE — UTILIZATION REVIEW
Initial Clinical Review    Admission: Date/Time/Statement: Inpatient Admission Orders (From admission, onward)     Ordered        12/05/19 2236  Inpatient Admission  Once                   Orders Placed This Encounter   Procedures    Inpatient Admission     Standing Status:   Standing     Number of Occurrences:   1     Order Specific Question:   Admitting Physician     Answer:   Lexi Duncan [[id number]]     Order Specific Question:   Level of Care     Answer:   Med Surg [[id number]]     Order Specific Question:   Estimated length of stay     Answer:   More than 2 Midnights     Order Specific Question:   Certification     Answer:   I certify that inpatient services are medically necessary for this patient for a duration of greater than two midnights  See H&P and MD Progress Notes for additional information about the patient's course of treatment  ED Arrival Information     Expected Arrival Acuity Means of Arrival Escorted By Service Admission Type    - 12/5/2019 15:35 Emergent Ambulance [facility] [place] [facility] Emergency Medicine Emergency    Arrival Complaint    Hyperglycemic        Chief Complaint   Patient presents with    Altered Mental Status     EMS was called for patient by his coworkers for [address]  Pt has no idea what has happened today  Repeatative questioning     Assessment/Plan:  53 y o  male who has a past medical history significant for essential hypertension, mixed hyperlipidemia, diabetes mellitus type 2 with insulin use and on metformin and Victoza  Pt was at work and seemed to be zoning out,  EMS was called  BS was 440 on arrival 300, he presents to the Er with altered mental status, he was unaware of where he was or what was going on  He does not seem to be aware that this is happening and is not aware of why he is in the hospital  Of note pt has had increased stress in the past few months, financial, had to sell his home  LP done in the ER, MRI was done after 2 doses of Ativan given   He is  admitted inpatient for altered mental status, disorientation  Toxicology consult   - 12/5 -  Await information re  His occupational exposure to chemicals, need to r/o aseptic meningitis vs encephalitis as well as psychiatric etiology  Notd he has been taking a large amount  Of NSAID's  for back pain( NSAIDS have been linked to aseptic meningitis and other CNS effects and complications )        Neurology consult  -  Pending     Psychiatry consult - 12/6 - Assessment:  Ashlyn Rodriguez is a 53 y o  male with medical history of diabetes mellitus type 2 , mixed hyperlipidemia, hypertension, presented to the hospital with disorientation and changes in mental status  Patient cannot recall the events that brought him to the hospital, he he has some stressors after he sold the house with he grew up but he states that the new house is good and he like it  Patient denies any history of mental illness treatment  He denies any symptoms of anxiety or depression  He denies any psychotic symptoms  Patient does have issue with memory he had been forgetful for some time     Diagnosis:  Adjustment disorder unspecified  Plan:   Continue medical management  Continue neurological workup    ED Triage Vitals   Temperature Pulse Respirations Blood Pressure SpO2   12/05/19 1621 12/05/19 1545 12/05/19 1545 12/05/19 1545 12/05/19 1545   98 1 °F (36 7 °C) 89 16 152/75 97 %      Temp Source Heart Rate Source Patient Position - Orthostatic VS BP Location FiO2 (%)   12/05/19 1621 12/05/19 1545 12/05/19 1718 12/05/19 1718 --   Oral Monitor Sitting Right arm       Pain Score       12/05/19 1545       No Pain        Wt Readings from Last 1 Encounters:   12/05/19 94 3 kg (208 lb)     Additional Vital Signs:   Date/Time  Temp  Pulse  Resp  BP  SpO2  O2 Device  Patient Position - Orthostatic VS   12/06/19 0733    75  18  128/74  95 %  None (Room air)  Lying   12/06/19 0530    72  16  134/74  95 %  None (Room air)  Lying   12/06/19 0500    68  16  134/75 96 %  None (Room air)  Lying   12/06/19 0230    76  16  109/57  95 %  None (Room air)  Lying   12/05/19 2345    80  16  120/70  95 %  None (Room air)  Lying   12/05/19 2215    75  18  114/64  94 %  None (Room air)  Lying   12/05/19 2115    82  20  131/86  96 %  None (Room air)  Lying   12/05/19 2111    83  18  117/82  95 %  None (Room air)  Lying   12/05/19 2000    82  17  119/66  95 %  None (Room air)     12/05/19 1830    86  15  119/66  95 %  None (Room air)  Lying   12/05/19 1817    86  20  114/66  95 %  None (Room air)  Sitting   12/05/19 1720            None (Room air)     12/05/19 1718    88  16  129/72  97 %  None (Room air)  Sitting           Pertinent Labs/Diagnostic Test Results:   Results from last 7 days   Lab Units 12/06/19  0422 12/05/19  1553   WBC Thousand/uL 9 69 11 15*   HEMOGLOBIN g/dL 13 6 14 3   HEMATOCRIT % 41 8 43 9   PLATELETS Thousands/uL 226 249   NEUTROS ABS Thousands/µL 6 03 7 99*     Results from last 7 days   Lab Units 12/06/19  0422 12/05/19  1556   SODIUM mmol/L 138 137   POTASSIUM mmol/L 3 9 4 9   CHLORIDE mmol/L 108 101   CO2 mmol/L 28 29   ANION GAP mmol/L 2* 7   BUN mg/dL 13 16   CREATININE mg/dL 0 75 0 99   EGFR ml/min/1 73sq m 103 85   CALCIUM mg/dL 8 5 9 9   MAGNESIUM mg/dL 1 9  --    PHOSPHORUS mg/dL 3 5  --      Results from last 7 days   Lab Units 12/06/19  0422 12/05/19  1556 12/05/19  1553   AST U/L 10 12  --    ALT U/L 23 28  --    ALK PHOS U/L 77 102  --    TOTAL PROTEIN g/dL 6 4 7 0  --    ALBUMIN g/dL 3 7 4 6  --    TOTAL BILIRUBIN mg/dL 0 51 0 33  --    AMMONIA umol/L  --   --  42*     Results from last 7 days   Lab Units 12/06/19  0716 12/05/19  2206 12/05/19  1539   POC GLUCOSE mg/dl 303* 145* 319*     Results from last 7 days   Lab Units 12/06/19  0422 12/05/19  1556   GLUCOSE RANDOM mg/dL 175* 360*     Results from last 7 days   Lab Units 12/05/19  1553   PH GEOVANNI  7 395   PCO2 GEOVANNI mm Hg 42 7   PO2 GEOVANNI mm Hg 51 1*   HCO3 GEOVANNI mmol/L 25 6   BASE EXC GEOVANNI mmol/L 0 5   O2 CONTENT GEOVANNI ml/dL 17 5   O2 HGB, VENOUS % 81 2*     Results from last 7 days   Lab Units 12/05/19  1553   TROPONIN I ng/mL <0 02     Results from last 7 days   Lab Units 12/06/19  0422 12/05/19  1556   TSH 3RD GENERATON uIU/mL 2 210 1 680     Results from last 7 days   Lab Units 12/06/19  0422   FERRITIN ng/mL 163     Results from last 7 days   Lab Units 12/05/19  2334   CRP mg/L <3 0     Results from last 7 days   Lab Units 12/06/19  0214 12/05/19  1827 12/05/19  1826   CLARITY UA  Clear Clear Clear   COLOR UA  Yellow Yellow Yellow   SPEC GRAV UA  1 031*  --  1 015   PH UA  7 0  --  7 0   GLUCOSE UA mg/dl >=1000 (1%)*  --  500 (1/2%)*   KETONES UA mg/dl Negative  --  Negative   BLOOD UA  Negative  --  Negative   PROTEIN UA mg/dl Negative  --  Negative   NITRITE UA  Negative  --  Negative   BILIRUBIN UA  Negative  --  Negative   UROBILINOGEN UA E U /dl 0 2  --  0 2   LEUKOCYTES UA  Elevated glucose may cause decreased leukocyte values  See urine microscopic for [facility] result/*  --  Negative   WBC UA /hpf None Seen  --   --    RBC UA /hpf None Seen  --   --    BACTERIA UA /hpf None Seen  --   --    EPITHELIAL CELLS WET PREP /hpf None Seen  --   --      Results from last 7 days   Lab Units 12/05/19  1556 12/05/19  1553   ETHANOL LVL mg/dL  --  <3   ACETAMINOPHEN LVL ug/mL <2*  --    SALICYLATE LVL mg/dL <3*  --      Results from last 7 days   Lab Units 12/05/19 2207   GRAM STAIN RESULT  No bacteria seen  No polys seen     Results from last 7 days   Lab Units 12/05/19 2208 12/05/19 2207   APPEARANCE CSF  clear  --    TUBE NUM CSF  4  --    WBC CSF /uL 0  --    XANTHOCHROMIA  No  --    GLUCOSE CSF mg/dL  --  143*   PROTEIN CSF mg/dL  --  45   RBC CSF uL  --  2  44*       MRI brain - 12/5 - 1   No acute intracranial process  2   Paranasal sinus disease  CTA head - 12/5 - No hemodynamically significant stenosis or occlusion of the major vessels of the [place]    CXR 12/6 - no acute  Ct Head - 12/5 -  No acute    ECG - 12/6 - NSR - incomplete RBBB - left anterior fascicular block - nonspecific ST & T wave abnormality         ED Treatment:   Medication Administration from 12/05/2019 1535 to 12/06/2019 0834       Date/Time Order Dose Route Action     12/05/2019 1616 sodium chloride 0 9 % bolus 1,000 mL 1,000 mL Intravenous New Bag     12/05/2019 1943 iohexol (OMNIPAQUE) 350 MG/ML injection (MULTI-DOSE) 85 mL 85 mL Intravenous Given     12/06/2019 0634 pantoprazole (PROTONIX) EC tablet 40 mg 40 mg Oral Given     12/05/2019 2336 sodium chloride 0 9 % infusion 75 mL/hr Intravenous New Bag     12/06/2019 0809 acetaminophen (TYLENOL) tablet 650 mg 650 mg Oral Given     12/06/2019 0742 insulin lispro (HumaLOG) 100 units/mL subcutaneous injection 1-6 Units 4 Units Subcutaneous Given     12/05/2019 2240 LORazepam (ATIVAN) 2 mg/mL injection 1 mg 1 mg Intravenous Given     12/05/2019 2242 LORazepam (ATIVAN) 2 mg/mL injection 1 mg 1 mg Intravenous Given        Past Medical History:   Diagnosis Date    Diabetes mellitus ([facility] [address] )     GERD (gastroesophageal reflux disease)     Hyperlipidemia     Hypertension      Present on Admission:   Disorientation      Admitting Diagnosis: Altered mental status [R41 82]  Age/Sex: 53 y o  male  Admission Orders:  Scheduled Medications:    Medications:  aspirin 81 mg Oral Daily   atorvastatin 40 mg Oral Daily With Dinner   fenofibrate 145 mg Oral Daily   insulin glargine 30 Units Subcutaneous BID   insulin lispro 1-5 Units Subcutaneous HS   insulin lispro 1-6 Units Subcutaneous TID AC   liraglutide 1 8 mg Subcutaneous Daily   losartan 100 mg Oral Daily   nicotine 1 patch Transdermal Daily   pantoprazole 40 mg Oral Early Morning     Continuous IV Infusions:    sodium chloride 75 mL/hr Intravenous Continuous     PRN Meds:    acetaminophen 650 mg Oral Q4H PRN   aluminum-magnesium hydroxide-simethicone 30 mL Oral Q6H PRN   docusate sodium 100 mg Oral BID PRN   ondansetron 4 mg Intravenous Q6H PRN     Nursing Orders - Neuro check q 1 x 4 q 2 x 4 then q 4 - up & OOB as tolerated - SCD's to le's-  Diet cons carb         Network Utilization Review Department  [email or web address]  ATTENTION: Please call with any questions or concerns to [phone] and carefully listen to the prompts so that you are directed to the right person  All voicemails are confidential   Neal Kay all requests for admission clinical reviews, approved or denied determinations and any other requests to dedicated fax number below belonging to the campus where the patient is receiving treatment   List of dedicated fax numbers for the Facilities:  [address] DENIALS (Administrative/Medical Necessity) [phone]   [address] (Maternity/NICU/Pediatrics) [phone]   Andrea Rodríguez [phone]   Eusebia Arrieta [phone]   Heidi Landa [phone]   145 [facility] 1525 [facility] [phone]   Dequan Polanco [phone]   [place] [address] [place] [address] [phone]

## 2019-12-06 NOTE — ASSESSMENT & PLAN NOTE
· Self-discontinued CPAP approximately 1 year ago    · Wife has been attempting to arrange repeat sleep study as outpatient

## 2019-12-06 NOTE — ED NOTES
Verification from pharmacy for [address] [place] [address] requested at this time        Nadiya Sen, RN  12/06/19 7336

## 2019-12-06 NOTE — ED NOTES
Patient reporting new onset of headache  Dr Shay Holman to be made aware       Jase Alvarado RN  12/05/19 2032

## 2019-12-06 NOTE — ASSESSMENT & PLAN NOTE
No results found for: HGBA1C    Recent Labs     12/05/19  1539 12/05/19  2206 12/06/19  0716   POCGLU 319* 145* 303*     · With hyperglycemia  Hemoglobin A1c pending  · Continue home dose of Lantus 30 units BID  Will also continue home dose of Victoza  Continue hold metformin    · Continue sliding scale    · Diabetic diet  · Monitor Accu-Cheks and adjust regimen as needed  · Outpatient f/u with endocrinology at [facility]

## 2019-12-06 NOTE — ASSESSMENT & PLAN NOTE
No results found for: HGBA1C    Recent Labs     12/05/19  1539 12/05/19  2206   POCGLU 319* 145*     Will continue with Lantus, Liraglutide and insulin sliding scale  Hemoglobin A1c    With on hold metformin as patient just had CT scan with contrast     Blood Sugar Average: Last 72 hrs:  (P) 232

## 2019-12-06 NOTE — ASSESSMENT & PLAN NOTE
· Intermittent, reportedly having episodes of "zoning out" while at work and performing daily activities as well as confusion and disorientation  Multiple episodes over past 2 weeks per wife  Unclear etiology  Patient currently alert and oriented x 3    · CT head, CTA head and neck, MRI of brain all unremarkable  · Status post lumbar puncture and everything at this point is negative  CSF culture remains pending  · Denies any new medications, elicit drug use  · Check UDS  · Check ammonia level  · Folate and B12 normal    · Await neuro and psychiatry input     · Neuro checks

## 2019-12-06 NOTE — ED NOTES
Dr Lenin Valenzuela and Dr Remberto Brown at the bedside for LP at this time       Rob Fraga, RN  12/05/19 2001

## 2019-12-06 NOTE — CONSULTS
Consultation - [address] 53 y o  male MRN: [id number]  Unit/Bed#: ED 08 Encounter: [id number]      Chief Complaint:  I do not know why I am here    History of Present Illness   Physician Requesting Consult: Divya Berger MD  Reason for Consult / Principal Problem:  New stress in life, possibility of adjustment disorder withdrawal reaction depression, altered mental status    Avel Holbrook is a 53 y o  male with medical history of Ms  Hyperlipidemia, hypertension, type 2 diabetes mellitus without complication and no prior psychiatric history presents with altered mental status disorientation  Patient states he does not remember how he came to the hospital, he remembered that he was told that he was doing the same door all day, 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 and he felt that this was like a dream he does not remember the rest   He states that he sold the house where  he grow up but he he he live now in a place that is 1 level and easy to maintain  He denies any other stressors  He states that he had been forgetful and he turned himself multiple times when he need to do to prevent for get to make payments all other important things  He was not able to recall any of the 3 words in 1 and 5 minutes  He denies any symptoms of depression and anxiety  He denies any prior treatment of mental health  He states that he have for children's on his on and he have 3 step children and he feels happy when they get together because the not too much control which others  Patient denies any suicidal ideation plan or intent, patient denies any psychotic symptoms  He does states that he has some sleep disturbances at times           Psychiatric Review Of Systems:  sleep: yes  appetite changes: no  weight changes: no  energy/anergy: no  interest/pleasure/anhedonia: no  somatic symptoms: no  anxiety/panic: no  misty: no  guilty/hopeless: no  self injurious behavior/risky behavior:  No    Historical Information   Past Psychiatric History:   None  Currently in treatment with none  Past Suicide attempts:  None  Past Violent behavior:  None  Past Psychiatric medication trial:  None    Substance Abuse History:  He used cocaine many years ago, approximately 20 years  He drinks on occasion  I have assessed this patient for substance use within the past 12 months     History of IP/OP rehabilitation program:  None  Smoking history:  He smoked half a pack or less a day  Family Psychiatric History:   His brother have alcohol issues he die secondary to alcohol abuse   sibling committed suicide    Social History  Education: high school diploma/GED  Learning Disabilities: None  Marital history:   Living arrangement, social support: He live with his wife  Occupational History:  Employed  Functioning Relationships: good support system    Other Pertinent History: No legal or  history    Traumatic History:   Abuse: He denies any history of abuse  Other Traumatic Events: None    Past Medical History:   Diagnosis Date    Diabetes mellitus ([address] )     GERD (gastroesophageal reflux disease)     Hyperlipidemia     Hypertension        Medical Review Of Systems:  Review of Systems - Negative except confusion, memory impairment, sadness all  other systems reviewed were negative    Meds/Allergies   current meds:   Current Facility-Administered Medications   Medication Dose Route Frequency    acetaminophen (TYLENOL) tablet 650 mg  650 mg Oral Q4H PRN    aluminum-magnesium hydroxide-simethicone (MYLANTA) 200-200-20 mg/5 mL oral suspension 30 mL  30 mL Oral Q6H PRN    aspirin (ECOTRIN LOW STRENGTH) EC tablet 81 mg  81 mg Oral Daily    atorvastatin (LIPITOR) tablet 40 mg  40 mg Oral Daily With Dinner    docusate sodium (COLACE) capsule 100 mg  100 mg Oral BID PRN    enoxaparin (LOVENOX) subcutaneous injection 40 mg  40 mg Subcutaneous Q24H [facility]    fenofibrate (TRICOR) tablet 145 mg  145 mg Oral Daily    insulin glargine (LANTUS) subcutaneous injection 30 Units 0 3 mL  30 Units Subcutaneous BID    insulin lispro (HumaLOG) 100 units/mL subcutaneous injection 1-5 Units  1-5 Units Subcutaneous HS    insulin lispro (HumaLOG) 100 units/mL subcutaneous injection 1-6 Units  1-6 Units Subcutaneous TID AC    liraglutide (VICTOZA) injection 1 8 mg  1 8 mg Subcutaneous Daily    losartan (COZAAR) tablet 100 mg  100 mg Oral Daily    nicotine (NICODERM CQ) 14 mg/24hr TD 24 hr patch 1 patch  1 patch Transdermal Daily    ondansetron (ZOFRAN) injection 4 mg  4 mg Intravenous Q6H PRN    pantoprazole (PROTONIX) EC tablet 40 mg  40 mg Oral Early Morning     Allergies   Allergen Reactions    Strawberry Extract Other (See Comments)       Objective   Vital signs in last 24 hours:  Temp:  [98 1 °F (36 7 °C)] 98 1 °F (36 7 °C)  HR:  [68-89] 83  Resp:  [15-20] 18  BP: (109-152)/(57-86) 114/79      Intake/Output Summary (Last 24 hours) at 12/6/2019 1327  Last data filed at 12/6/2019 1134  Gross per 24 hour   Intake 1400 ml   Output 1050 ml   Net 350 ml       Mental Status Evaluation:  Appearance:  age appropriate   Behavior:  Cooperative   Speech:  soft   Mood:  sad   Affect:  mood-congruent   Language: naming objects and repeating phrases   Thought Process:  goal directed   Associations: circumstantial associations   Thought Content:  normal   Perceptual Disturbances: None   Risk Potential: He denies any suicidal homicidal ideation plan or intent   Sensorium:  person, place and time/date   Memory:  short term memory impaired, long term memory intact   Cognition:  Fair   Consciousness:  alert and awake    Attention: attention span appeared shorter than expected for age   Intellect: within normal limits   Fund of Knowledge: awareness of current events: Fair, past history: Limited and vocabulary: Fair   Insight:  fair   Judgment: fair   Muscle Strength and Tone: Within normal limits   Gait/Station: Unable to assess patient was in a chair Motor Activity: no abnormal movements     Lab Results:    I have personally reviewed all pertinent laboratory/tests results  Most Recent Labs:   Lab Results   Component Value Date    WBC 9 69 12/06/2019    RBC 4 85 12/06/2019    HGB 13 6 12/06/2019    HCT 41 8 12/06/2019     12/06/2019    RDW 13 0 12/06/2019    NEUTROABS 6 03 12/06/2019    SODIUM 138 12/06/2019    K 3 9 12/06/2019     12/06/2019    CO2 28 12/06/2019    BUN 13 12/06/2019    CREATININE 0 75 12/06/2019    GLUC 175 (H) 12/06/2019    CALCIUM 8 5 12/06/2019    AST 10 12/06/2019    ALT 23 12/06/2019    ALKPHOS 77 12/06/2019    TP 6 4 12/06/2019    ALB 3 7 12/06/2019    TBILI 0 51 12/06/2019    CHOLESTEROL 121 12/06/2019    HDL 33 (L) 12/06/2019    TRIG 113 12/06/2019    LDLCALC 65 12/06/2019    AMMONIA 20 12/06/2019    UIH7IDTYSRIH 2 210 12/06/2019    HGBA1C 7 5 (H) 12/05/2019     12/05/2019       Code Status: )Level 1 - Full Code    Assessment/Plan     Assessment:  Bakari Arauz is a 53 y o  male with medical history of diabetes mellitus type 2 , mixed hyperlipidemia, hypertension, presented to the hospital with disorientation and changes in mental status  Patient cannot recall the events that brought him to the hospital, he he has some stressors after he sold the house with he grew up but he states that the new house is good and he like it  Patient denies any history of mental illness treatment  He denies any symptoms of anxiety or depression  He denies any psychotic symptoms  Patient does have issue with memory he had been forgetful for some time     Diagnosis:  Adjustment disorder unspecified  Plan:   Continue medical management  Continue neurological workup  Upon discharge patient can benefit from individual therapy, the need to contact insurance for providers  Discussed with Neurology   No other intervention at this moment  I will sign off  Risks, benefits and possible side effects of Medications:   No medication given      Pearle Skill, MD

## 2019-12-06 NOTE — CONSULTS
Consultation - Medical Toxicology  Lila Ayala 53 y o  male MRN: [id number]  Unit/Bed#: ED 08 Encounter: [id number]      Reason for Consult / Principal Problem:  Altered mental status  Inpatient consult to Toxicology  Consult performed by: Avel Mansfield DO  Consult ordered by: Umm Grijalva MD        12/05/19      ASSESSMENT:  58-year-old male with altered mental status    RECOMMENDATIONS:  Please admit patient and continue supportive care and further evaluation  I do not currently have any evidence uncovered to relate patient's symptoms to any occupational exposures, which was primary question involved in consultation  Please retrieve names of chemicals regarding patient's occupational exposures if continued concern exists and we can further review at that time  At this time, would recommend CTA of head and neck, MRI, lumbar puncture and neurological consultation  Additional considerations may include aseptic meningitis versus encephalitis, as well as psychiatric etiology  Of note, the patient has taking large amounts NSAIDs and NSAIDs have been linked to aseptic meningitis and other central nervous system effects and complications  Although this does presented concern the patient's condition, it  would be a diagnosis of exclusion  For further questions, please call [facility]  Service or Patient Access Center to reach the medical  on call  Hx and PE limited by: Altered mental status    HPI: Lila Ayala is a 53y o  year old male who presents with altered mental status  Patient seems to [employer] while at work and while performing daily activities  He does not seem to be aware that this is happening and does not seem to be aware of why he is here in hospital   He is oriented to person, place and time  He denies any symptoms, including fever, headache, neck stiffness, photophobia    His wife at the bedside states that he has had back pain that has been worsening and he has been taking large amounts of naproxen for this  The patient denies any current back pain  Review of Systems   Constitutional: Negative for fatigue and fever  HENT: Negative  Eyes: Negative  Respiratory: Negative  Cardiovascular: Negative  Gastrointestinal: Negative  Genitourinary: Negative  Musculoskeletal: Positive for back pain  Skin: Negative  Neurological: Negative  Psychiatric/Behavioral: Negative  Historical Information   Past Medical History:   Diagnosis Date    Diabetes mellitus ([address] )     GERD (gastroesophageal reflux disease)     Hyperlipidemia     Hypertension      History reviewed  No pertinent surgical history  Social History   Social History     Substance and Sexual Activity   Alcohol Use Yes    Frequency: Never     Social History     Substance and Sexual Activity   Drug Use Never     Social History     Tobacco Use   Smoking Status Current Every Day Smoker    Packs/day: 0 50    Types: Cigarettes   Smokeless Tobacco Current User     History reviewed  No pertinent family history  Prior to Admission medications    Medication Sig Start Date End Date Taking?  Authorizing Provider   aspirin (ECOTRIN LOW STRENGTH) 81 mg EC tablet Take 81 mg by mouth daily   Yes Historical Provider, MD   fenofibrate (TRICOR) 145 mg tablet Take 145 mg by mouth daily   Yes Historical Provider, MD   insulin glargine (LANTUS) 100 units/mL subcutaneous injection Inject 30 Units under the skin 2 (two) times a day   Yes Historical Provider, MD   irbesartan (AVAPRO) 300 mg tablet Take 300 mg by mouth daily   Yes Historical Provider, MD   lansoprazole (PREVACID) 15 mg capsule Take 15 mg by mouth daily   Yes Historical Provider, MD   Liraglutide (VICTOZA SC) Inject 1 8 mcg under the skin daily   Yes Historical Provider, MD   metFORMIN (GLUCOPHAGE) 1000 MG tablet Take 1,000 mg by mouth 2 (two) times a day 9/5/19  Yes Historical Provider, MD   rosuvastatin (CRESTOR) 20 MG tablet Take 20 mg by mouth daily   Yes Historical Provider, MD       No current facility-administered medications for this encounter  Allergies   Allergen Reactions    Strawberry Extract Other (See Comments)       Objective     No intake or output data in the 24 hours ending 12/05/19 1948    Invasive Devices:   Peripheral IV 12/05/19 Right Antecubital (Active)       Vitals   Vitals:    12/05/19 1621 12/05/19 1718 12/05/19 1817 12/05/19 1830   BP: 127/72 129/72 114/66 119/66   TempSrc: Oral      Pulse: 88 88 86 86   Resp: 16 16 20 15   Patient Position - Orthostatic VS:  Sitting Sitting Lying   Temp: 98 1 °F (36 7 °C)          Physical Exam   Constitutional: He is oriented to person, place, and time  He appears well-developed and well-nourished  HENT:   Head: Normocephalic and atraumatic  Eyes: Pupils are equal, round, and reactive to light  Right eye exhibits nystagmus  Left eye exhibits nystagmus  Neck: Normal range of motion  Neck supple  Cardiovascular: Normal rate and regular rhythm  Pulmonary/Chest: Effort normal and breath sounds normal    Abdominal: Soft  Bowel sounds are normal  There is no tenderness  Musculoskeletal: Normal range of motion  Neurological: He is alert and oriented to person, place, and time  He displays tremor  He exhibits normal muscle tone  He displays no seizure activity  Disoriented to situation  Horizontal nystagmus  Bilateral dysmetria   Skin: Skin is warm and dry  Psychiatric: His speech is normal  His affect is blunt  He is slowed  He is not actively hallucinating  Cognition and memory are impaired  He is inattentive  EKG, Pathology, and Other Studies: I have personally reviewed pertinent reports  and Right bundle branch block    Lab Results: I have personally reviewed pertinent reports        Labs:  Results from last 7 days   Lab Units 12/05/19  1553   WBC Thousand/uL 11 15*   HEMOGLOBIN g/dL 14 3   HEMATOCRIT % 43 9   PLATELETS Thousands/uL 249   NEUTROS PCT % 71 LYMPHS PCT % 19   MONOS PCT % 7      Results from last 7 days   Lab Units 12/05/19  1556   SODIUM mmol/L 137   POTASSIUM mmol/L 4 9   CHLORIDE mmol/L 101   CO2 mmol/L 29   BUN mg/dL 16   CREATININE mg/dL 0 99   CALCIUM mg/dL 9 9   ALK PHOS U/L 102   ALT U/L 28   AST U/L 12              0   Lab Value Date/Time    TROPONINI <0 02 12/05/2019 1553     Results from last 7 days   Lab Units 12/05/19  1553   PH GEOVANNI  7 395   PCO2 GEOVANNI mm Hg 42 7   PO2 GEOVANNI mm Hg 51 1*   HCO3 GEOVANNI mmol/L 25 6   O2 CONTENT GEOVANNI ml/dL 17 5   O2 HGB, VENOUS % 81 2*     Results from last 7 days   Lab Units 12/05/19  1556 12/05/19  1553   ACETAMINOPHEN LVL ug/mL <2*  --    ETHANOL LVL mg/dL  --  <3   SALICYLATE LVL mg/dL <3*  --          Imaging Studies: I have personally reviewed pertinent reports  and CT scan of head unremarkable  Counseling / Coordination of Care  Total floor / unit time spent today 64 minutes  Greater than 50% of total time was spent with the patient and / or family counseling and / or coordination of care

## 2019-12-06 NOTE — ED PROCEDURE NOTE
Procedure  Lumbar puncture  Date/Time: 12/5/2019 10:19 PM  Performed by: Jose Marquez DO  Authorized by: Jose Marquez DO     Patient location:  ED  Other Assisting Provider: Yes (comment) (Dr Jose Luis Gaxiola)    Consent:     Consent obtained:  Written    Consent given by:  Spouse    Risks discussed:  Bleeding, infection, headache, nerve damage, repeat procedure and pain    Alternatives discussed:  No treatment, delayed treatment and observation  Universal protocol:     Procedure explained and questions answered to patient or proxy's satisfaction: yes      Immediately prior to procedure a time out was called: yes      Site/side marked: yes      Patient identity confirmed:  Arm band  Pre-procedure details:     Preparation: Patient was prepped and draped in usual sterile fashion    Indications:     Indications: evaluation for infection, evaluation for altered mental status and diagnostic intervention    Anesthesia (see MAR for exact dosages): Anesthesia method:  Local infiltration    Local anesthetic:  Lidocaine 1% w/o epi  Procedure details:     Lumbar space:  L3-L4 interspace    Patient position:  Sitting    Equipment: Lumbar puncture kit used      Needle gauge:  20G x 2 5in    Needle type:  Spinal needle - Quincke tip    Ultrasound guidance: no      Number of attempts:  2    Fluid appearance:  Clear    Tubes of fluid:  4    Total volume (ml):  6  Post-procedure:     Puncture site:  Adhesive bandage applied    Patient tolerance of procedure:   Tolerated well, no immediate complications    Patient instructed to lie flat for one(1) hour post lumbar puncture : Yes                       Jose Marquez DO  12/05/19 2229

## 2019-12-06 NOTE — ED NOTES
Patient still combative after ordered ativan, additional 1 mg to be ordered        Ben Goldberg RN  12/05/19 4049

## 2019-12-06 NOTE — ASSESSMENT & PLAN NOTE
Although patient will continue to see Neurology, MRI has been done and is finally read as normal; would like to get Psychiatry opinion regarding the possibility of adjustment disorder with withdrawal behavior  Meanwhile as per emergency room physicians, Neurology has recommended CSF studies  Initially acyclovir was ordered however currently placed on hold upon discussion between medicine and ER; MRI being normal in so far CSF studies are coming back within normal limits  Elevated glucose most likely secondary to diabetes

## 2019-12-06 NOTE — ED NOTES
Patient's lunch tray arrived, 2 Units insulin given and patient now eating        [place], RINA  12/06/19 5802

## 2019-12-07 LAB
ARTERIAL PATENCY WRIST A: YES
BASE EXCESS BLDA CALC-SCNC: 1 MMOL/L
ERYTHROCYTE [SEDIMENTATION RATE] IN BLOOD: 10 MM/HOUR (ref 0–10)
GLUCOSE SERPL-MCNC: 171 MG/DL (ref 65–140)
GLUCOSE SERPL-MCNC: 220 MG/DL (ref 65–140)
GLUCOSE SERPL-MCNC: 242 MG/DL (ref 65–140)
GLUCOSE SERPL-MCNC: 305 MG/DL (ref 65–140)
HCO3 BLDA-SCNC: 25 MMOL/L (ref 22–28)
O2 CT BLDA-SCNC: 21 ML/DL (ref 16–23)
OXYHGB MFR BLDA: 96.9 % (ref 94–97)
PCO2 BLDA: 37.7 MM HG (ref 36–44)
PH BLDA: 7.44 [PH] (ref 7.35–7.45)
PO2 BLDA: 95.8 MM HG (ref 75–129)
SPECIMEN SOURCE: NORMAL

## 2019-12-07 PROCEDURE — 85652 RBC SED RATE AUTOMATED: CPT | Performed by: PSYCHIATRY & NEUROLOGY

## 2019-12-07 PROCEDURE — 84166 PROTEIN E-PHORESIS/URINE/CSF: CPT | Performed by: PSYCHIATRY & NEUROLOGY

## 2019-12-07 PROCEDURE — 82948 REAGENT STRIP/BLOOD GLUCOSE: CPT

## 2019-12-07 PROCEDURE — 36600 WITHDRAWAL OF ARTERIAL BLOOD: CPT

## 2019-12-07 PROCEDURE — 82805 BLOOD GASES W/O2 SATURATION: CPT | Performed by: INTERNAL MEDICINE

## 2019-12-07 PROCEDURE — 99233 SBSQ HOSP IP/OBS HIGH 50: CPT | Performed by: PSYCHIATRY & NEUROLOGY

## 2019-12-07 PROCEDURE — 99233 SBSQ HOSP IP/OBS HIGH 50: CPT | Performed by: INTERNAL MEDICINE

## 2019-12-07 RX ORDER — INSULIN GLARGINE 100 [IU]/ML
35 INJECTION, SOLUTION SUBCUTANEOUS 2 TIMES DAILY
Status: DISCONTINUED | OUTPATIENT
Start: 2019-12-07 | End: 2019-12-09 | Stop reason: HOSPADM

## 2019-12-07 RX ORDER — ASCORBIC ACID 500 MG
500 TABLET ORAL DAILY
Status: DISCONTINUED | OUTPATIENT
Start: 2019-12-07 | End: 2019-12-09 | Stop reason: HOSPADM

## 2019-12-07 RX ORDER — FERROUS SULFATE 325(65) MG
325 TABLET ORAL
Status: DISCONTINUED | OUTPATIENT
Start: 2019-12-08 | End: 2019-12-09 | Stop reason: HOSPADM

## 2019-12-07 RX ORDER — FOLIC ACID 1 MG/1
1 TABLET ORAL DAILY
Status: DISCONTINUED | OUTPATIENT
Start: 2019-12-07 | End: 2019-12-09 | Stop reason: HOSPADM

## 2019-12-07 RX ADMIN — ASPIRIN 81 MG: 81 TABLET ORAL at 08:27

## 2019-12-07 RX ADMIN — PANTOPRAZOLE SODIUM 40 MG: 40 TABLET, DELAYED RELEASE ORAL at 05:40

## 2019-12-07 RX ADMIN — DOCUSATE SODIUM 100 MG: 100 CAPSULE, LIQUID FILLED ORAL at 20:20

## 2019-12-07 RX ADMIN — LOSARTAN POTASSIUM 100 MG: 50 TABLET, FILM COATED ORAL at 08:28

## 2019-12-07 RX ADMIN — INSULIN LISPRO 2 UNITS: 100 INJECTION, SOLUTION INTRAVENOUS; SUBCUTANEOUS at 21:43

## 2019-12-07 RX ADMIN — INSULIN GLARGINE 35 UNITS: 100 INJECTION, SOLUTION SUBCUTANEOUS at 20:19

## 2019-12-07 RX ADMIN — INSULIN LISPRO 1 UNITS: 100 INJECTION, SOLUTION INTRAVENOUS; SUBCUTANEOUS at 08:32

## 2019-12-07 RX ADMIN — INSULIN LISPRO 2 UNITS: 100 INJECTION, SOLUTION INTRAVENOUS; SUBCUTANEOUS at 16:49

## 2019-12-07 RX ADMIN — ENOXAPARIN SODIUM 40 MG: 40 INJECTION SUBCUTANEOUS at 08:29

## 2019-12-07 RX ADMIN — ATORVASTATIN CALCIUM 40 MG: 40 TABLET, FILM COATED ORAL at 16:48

## 2019-12-07 RX ADMIN — ACETAMINOPHEN 650 MG: 325 TABLET ORAL at 10:04

## 2019-12-07 RX ADMIN — INSULIN LISPRO 4 UNITS: 100 INJECTION, SOLUTION INTRAVENOUS; SUBCUTANEOUS at 11:03

## 2019-12-07 RX ADMIN — FOLIC ACID 1 MG: 1 TABLET ORAL at 12:47

## 2019-12-07 RX ADMIN — OXYCODONE HYDROCHLORIDE AND ACETAMINOPHEN 500 MG: 500 TABLET ORAL at 12:46

## 2019-12-07 RX ADMIN — INSULIN GLARGINE 30 UNITS: 100 INJECTION, SOLUTION SUBCUTANEOUS at 08:33

## 2019-12-07 NOTE — PROGRESS NOTES
Progress Note - Shauna Bellautch 1965, 53 y o  male MRN: [id number]    [facility] Encounter: [id number]    Primary Care Provider: Stan Bartlett MD   Date and time admitted to hospital: 12/5/2019  3:36 PM        EILEEN (obstructive sleep apnea)  Assessment & Plan  · Self-discontinued CPAP approximately 1 year ago  · Wife has been attempting to arrange repeat sleep study as outpatient  · ABG shows no hypercapnia    Tobacco use  Assessment & Plan  · NRT  · Encouraged cessation    Adjustment disorder with withdrawal  Assessment & Plan  Patient will require outpatient follow-up with Psychiatry and therapist    Mixed hyperlipidemia  Assessment & Plan  · On fenofibrate and statin    Essential hypertension  Assessment & Plan  · Continued Cozaar substitute patient's home dose of Avapro  · Controlled    Type 2 diabetes mellitus without complication, with long-term current use of insulin [facility])  Assessment & Plan  Lab Results   Component Value Date    HGBA1C 7 5 (H) 12/05/2019       Recent Labs     12/06/19  2109 12/07/19  0740 12/07/19  1100 12/07/19  1557   POCGLU 228* 171* 305* 220*     · With hyperglycemia  · Will discontinue home dose of Victoza and metformin  · Continue sliding scale , Diabetic diet, adjusted to Lantus 35 units b i d   · Monitor Accu-Cheks and adjust regimen as needed  · Outpatient f/u with endocrinology at [facility]  · Intermittent, reportedly having episodes of "zoning out" while at work and performing daily activities as well as confusion and disorientation  Multiple episodes over past 2 weeks per wife  Unclear etiology  Patient currently alert and oriented x 3    · CT head, CTA head and neck, MRI of brain all unremarkable  · Status post lumbar puncture and everything at this point is negative  CSF culture remains pending  · Denies any new medications, elicit drug use     · Check UDS, ammonia, sed rate, CRP, lead Folate and B12 negative  · Appreciate neuro and psychiatry input  · CSF studies showed no WBC, minimal RBCs, Gram stain negative, minimal protein, meningitis panel pending  · This is likely dissociation from psychological stressors in patient's life recently, patient will likely need outpatient follow up with therapist      VTE Pharmacologic Prophylaxis:   Pharmacologic: Enoxaparin (Lovenox)  Mechanical VTE Prophylaxis in Place: Yes    Patient Centered Rounds: I have performed bedside rounds with nursing staff today  Discussions with Specialists or Other Care Team Provider: neurology    Education and Discussions with Family / Patient: patient and wife    Time Spent for Care: 30 minutes  More than 50% of total time spent on counseling and coordination of care as described above  Current Length of Stay: 2 day(s)    Current Patient Status: Inpatient   Certification Statement: The patient will continue to require additional inpatient hospital stay due to further evaluation    Discharge Plan: likely home in 2-3 days    Code Status: Level 1 - Full Code      Subjective:   No acute overnight events  Had a lengthy discussion this morning with patient and patient's wife regarding their concerns and plan with determining the etiology of patient's presentation  All their questions were answered  Objective:     Vitals:   Temp (24hrs), Av 7 °F (36 5 °C), Min:97 4 °F (36 3 °C), Max:98 2 °F (36 8 °C)    Temp:  [97 4 °F (36 3 °C)-98 2 °F (36 8 °C)] 97 4 °F (36 3 °C)  HR:  [70-86] 86  Resp:  [16-18] 17  BP: (108-121)/(67-88) 121/88  SpO2:  [96 %-98 %] 96 %  Body mass index is 27 89 kg/m²  Input and Output Summary (last 24 hours): Intake/Output Summary (Last 24 hours) at 2019 1859  Last data filed at 2019 1700  Gross per 24 hour   Intake 740 ml   Output 500 ml   Net 240 ml       Physical Exam:     Physical Exam   Constitutional: He is oriented to person, place, and time  He appears well-developed and well-nourished     HENT:   Head: Normocephalic and atraumatic  Mouth/Throat: Oropharynx is clear and moist    Eyes: Pupils are equal, round, and reactive to light  Conjunctivae are normal    Neck: Neck supple  No JVD present  Cardiovascular: Normal rate, regular rhythm, normal heart sounds and intact distal pulses  Pulmonary/Chest: Effort normal and breath sounds normal    Abdominal: Soft  Bowel sounds are normal    Musculoskeletal: He exhibits no edema or tenderness  Neurological: He is alert and oriented to person, place, and time  Skin: Skin is warm and dry  Capillary refill takes less than 2 seconds  Psychiatric:   Flat affect   Nursing note and vitals reviewed  Additional Data:     Labs:    Results from last 7 days   Lab Units 12/06/19  1922   WBC Thousand/uL 11 64*   HEMOGLOBIN g/dL 14 2   HEMATOCRIT % 43 5   PLATELETS Thousands/uL 250   NEUTROS PCT % 63   LYMPHS PCT % 26   MONOS PCT % 7   EOS PCT % 3     Results from last 7 days   Lab Units 12/06/19  0422   SODIUM mmol/L 138   POTASSIUM mmol/L 3 9   CHLORIDE mmol/L 108   CO2 mmol/L 28   BUN mg/dL 13   CREATININE mg/dL 0 75   ANION GAP mmol/L 2*   CALCIUM mg/dL 8 5   ALBUMIN g/dL 3 7   TOTAL BILIRUBIN mg/dL 0 51   ALK PHOS U/L 77   ALT U/L 23   AST U/L 10   GLUCOSE RANDOM mg/dL 175*         Results from last 7 days   Lab Units 12/07/19  1557 12/07/19  1100 12/07/19  0740 12/06/19  2109 12/06/19  1615 12/06/19  1106 12/06/19  0716 12/05/19  2206 12/05/19  1539   POC GLUCOSE mg/dl 220* 305* 171* 228* 284* 220* 303* 145* 319*     Results from last 7 days   Lab Units 12/05/19  2334   HEMOGLOBIN A1C % 7 5*               * I Have Reviewed All Lab Data Listed Above  * Additional Pertinent Lab Tests Reviewed:  All Labs Within Last 24 Hours Reviewed    Imaging:    Imaging Reports Reviewed Today Include: n/a  Imaging Personally Reviewed by Myself Includes:  n/a    Recent Cultures (last 7 days):     Results from last 7 days   Lab Units 12/05/19  2207   GRAM STAIN RESULT  No bacteria seen  No polys seen       Last 24 Hours Medication List:     Current Facility-Administered Medications:  acetaminophen 650 mg Oral Q4H PRN Lakeshia Saucedo MD   aluminum-magnesium hydroxide-simethicone 30 mL Oral Q6H PRN Lakeshia Saucedo MD   ascorbic acid 500 mg Oral Daily Júnior Foy MD   aspirin 81 mg Oral Daily Lakeshia Saucedo MD   atorvastatin 40 mg Oral Daily With Madeline Garcia MD   docusate sodium 100 mg Oral BID PRN Lakeshia Saucedo MD   enoxaparin 40 mg Subcutaneous Q24H [facility] EULA Prasad   fenofibrate 145 mg Oral Daily MD [facility] ON 12/8/2019] ferrous sulfate 325 mg Oral Daily With Breakfast Júnior Foy MD   folic acid 1 mg Oral Daily Júnior Foy MD   insulin glargine 35 Units Subcutaneous BID Júnior Foy MD   insulin lispro 1-5 Units Subcutaneous HS Lakeshia Saucedo MD   insulin lispro 1-6 Units Subcutaneous TID AC Lakeshia Saucedo MD   losartan 100 mg Oral Daily Lakeshia Saucedo MD   nicotine polacrilex 2 mg Oral Q2H PRN EULA Prasad   ondansetron 4 mg Intravenous Q6H PRN Lakeshia Saucedo MD   pantoprazole 40 mg Oral Early Morning Lakeshia Saucedo MD        Today, Patient Was Seen By: Júnior Foy MD    ** Please Note: Dictation voice to text software may have been used in the creation of this document   **

## 2019-12-07 NOTE — ASSESSMENT & PLAN NOTE
· Self-discontinued CPAP approximately 1 year ago    · Wife has been attempting to arrange repeat sleep study as outpatient  · ABG shows no hypercapnia

## 2019-12-07 NOTE — PROGRESS NOTES
Progress Note - Neurology   Maribell Munson 53 y o  male MRN: [id number]  Unit/Bed#: [facility] 820-01 Encounter: [id number]    Assessment:  53year old male presenting with disorientation and changes in mental status     Plan:  1  Altered mental status    - Unclear etiology of symptoms     - MRI brain without contrast completed and no acute intracranial process noted  - EEG pending    - CSF studies thus far: protein 45, RBCs 2, WBCs 0, glucose 143, gram stain no polys/no bacteria   - CRP <3 0, ammonia 20, ESR 10, VIT B12 498, folate 19 8, lead <1 0   - RPR, SPEP, UPEP, Tau 14-3-3, heavy metals screen, Vit B1 pending    - Monitor exam and notify with changes  Subjective:   Patient notes he is not sure why he is in the hospital  Per family at bedside, patient has been fixating on different things today such as missing seeing his granddaughter for her birthday yesterday  He also is tearful during evaluation while discussing selling his home and moving  Per his family, it was the house that he grew up in and raised his family in  He is very tearful when discussing that and family notes that he also has other stressors which may be playing a role in his symptoms       ROS:  History obtained from the patient  General ROS: negative for - chills, fatigue or fever  Ophthalmic ROS: negative for - blurry vision, decreased vision or double vision  Respiratory ROS: negative for - shortness of breath  Cardiovascular ROS: negative for - chest pain  Gastrointestinal ROS: negative for - abdominal pain or nausea/vomiting  Musculoskeletal ROS: negative for - gait disturbance or muscular weakness  Neurological ROS: negative for - confusion, dizziness, headaches, numbness/tingling, speech problems, visual changes or weakness    Medications  Scheduled Meds:  Current Facility-Administered Medications:  acetaminophen 650 mg Oral Q4H PRN Dimple Murguia MD   aluminum-magnesium hydroxide-simethicone 30 mL Oral Q6H PRN Dimple Murguia MD ascorbic acid 500 mg Oral Daily Mcihael Estrada MD   aspirin 81 mg Oral Daily Waqar Howell MD   atorvastatin 40 mg Oral Daily With Patricia Albright MD   docusate sodium 100 mg Oral BID PRN Waqar Howell MD   enoxaparin 40 mg Subcutaneous Q24H [address] Sona MITCHELL RileyEULA   fenofibrate 145 mg Oral Daily Waqar Howell MD   Denver Beal ON 12/8/2019] ferrous sulfate 325 mg Oral Daily With Breakfast Michael Estrada MD   folic acid 1 mg Oral Daily Michael Estrada MD   insulin glargine 35 Units Subcutaneous BID Michael Estrada MD   insulin lispro 1-5 Units Subcutaneous HS Waqar Howell MD   insulin lispro 1-6 Units Subcutaneous TID AC Waqar Howell MD   losartan 100 mg Oral Daily Waqar Howell MD   nicotine polacrilex 2 mg Oral Q2H PRN Sona Sunitha PreciadoEULA   ondansetron 4 mg Intravenous Q6H PRN Waqar Howell MD   pantoprazole 40 mg Oral Early Morning Waqar Howell MD     Continuous Infusions:   PRN Meds:   acetaminophen    aluminum-magnesium hydroxide-simethicone    docusate sodium    nicotine polacrilex    ondansetron    Vitals: Blood pressure 119/71, pulse 71, temperature 98 2 °F (36 8 °C), resp  rate 16, height 5' 11" (1 803 m), weight 90 7 kg (200 lb), SpO2 97 %  ,Body mass index is 27 89 kg/m²  Physical Exam: /88   Pulse 86   Temp (!) 97 4 °F (36 3 °C)   Resp 17   Ht 5' 11" (1 803 m)   Wt 90 7 kg (200 lb)   SpO2 96%   BMI 27 89 kg/m²   General appearance: alert and oriented, in no acute distress and speech is clear, no evidence of aphasia  Able to spell word "world" forward and backward  Able to name president, able to state month/year, upcoming holiday   Unable to state why he is in the hospital    Head: Normocephalic, without obvious abnormality, atraumatic  Eyes: negative findings: lids and lashes normal, conjunctivae and sclerae normal and pupils equal, round, reactive to light and accomodation  Lungs: clear to auscultation bilaterally  Heart: regular rate and rhythm  Abdomen: soft, non-tender; bowel sounds normal; no masses,  no organomegaly  Extremities: extremities normal, warm and well-perfused; no cyanosis, clubbing, or edema  Skin: Skin color, texture, turgor normal  No rashes or lesions  Neurologic: Mental status: Alert, oriented, thought content appropriate  Cranial nerves: II: pupils equal, round, reactive to light and accommodation, III,VII: ptosis no ptosis noted, III,IV,VI: extraocular muscles extra-ocular motions intact, VII: upper facial muscle function normal bilaterally, VII: lower facial muscle function normal bilaterally, XII: tongue strength normal  Sensory: normal, Grossly intact to crude touch  Motor: Moves all extremities well and follows commands       Labs  Recent Results (from the past 24 hour(s))   Fingerstick Glucose (POCT)    Collection Time: 12/06/19  4:15 PM   Result Value Ref Range    POC Glucose 284 (H) 65 - 140 mg/dl   CBC and differential    Collection Time: 12/06/19  7:22 PM   Result Value Ref Range    WBC 11 64 (H) 4 31 - 10 16 Thousand/uL    RBC 5 09 3 88 - 5 62 Million/uL    Hemoglobin 14 2 12 0 - 17 0 g/dL    Hematocrit 43 5 36 5 - 49 3 %    MCV 86 82 - 98 fL    MCH 27 9 26 8 - 34 3 pg    MCHC 32 6 31 4 - 37 4 g/dL    RDW 12 9 11 6 - 15 1 %    MPV 10 5 8 9 - 12 7 fL    Platelets 631 559 - 831 Thousands/uL    nRBC 0 /100 WBCs    Neutrophils Relative 63 43 - 75 %    Immat GRANS % 0 0 - 2 %    Lymphocytes Relative 26 14 - 44 %    Monocytes Relative 7 4 - 12 %    Eosinophils Relative 3 0 - 6 %    Basophils Relative 1 0 - 1 %    Neutrophils Absolute 7 40 1 85 - 7 62 Thousands/µL    Immature Grans Absolute 0 03 0 00 - 0 20 Thousand/uL    Lymphocytes Absolute 3 02 0 60 - 4 47 Thousands/µL    Monocytes Absolute 0 77 0 17 - 1 22 Thousand/µL    Eosinophils Absolute 0 31 0 00 - 0 61 Thousand/µL    Basophils Absolute 0 11 (H) 0 00 - 0 10 Thousands/µL   C-reactive protein    Collection Time: 12/06/19  7:22 PM   Result Value Ref Range    CRP <3 0 <3 0 mg/L   Fingerstick Glucose (POCT)    Collection Time: 12/06/19  9:09 PM   Result Value Ref Range    POC Glucose 228 (H) 65 - 140 mg/dl   Fingerstick Glucose (POCT)    Collection Time: 12/07/19  7:40 AM   Result Value Ref Range    POC Glucose 171 (H) 65 - 140 mg/dl   Sedimentation rate, automated    Collection Time: 12/07/19  8:16 AM   Result Value Ref Range    Sed Rate 10 0 - 10 mm/hour   Fingerstick Glucose (POCT)    Collection Time: 12/07/19 11:00 AM   Result Value Ref Range    POC Glucose 305 (H) 65 - 140 mg/dl   Blood gas, arterial    Collection Time: 12/07/19 12:23 PM   Result Value Ref Range    pH, Arterial 7 439 7 350 - 7 450    pCO2, Arterial 37 7 36 0 - 44 0 mm Hg    pO2, Arterial 95 8 75 0 - 129 0 mm Hg    HCO3, Arterial 25 0 22 0 - 28 0 mmol/L    Base Excess, Arterial 1 0 mmol/L    O2 Content, Arterial 21 0 16 0 - 23 0 mL/dL    O2 HGB,Arterial  96 9 94 0 - 97 0 %    SOURCE Radial, Right     SHAWN TEST Yes      Imaging   No new neuro imaging available for review  VTE Prophylaxis: Enoxaparin (Lovenox)    Counseling / Coordination of Care  Total time spent today 40 minutes  Greater than 50% of total time was spent with the patient and / or family counseling and / or coordination of care  A description of the counseling / coordination of care: Extensive amount of time spent with patient and his family discussing potential etiologies for his altered mental status  Strong suspicion for underlying anxiety, depression, and stressors contributing to the current presentation  Recommend complete EEG to rule out underlying epileptiform features  Discussed with EEG team, will attempt to complete this study tomorrow

## 2019-12-07 NOTE — ASSESSMENT & PLAN NOTE
Lab Results   Component Value Date    HGBA1C 7 5 (H) 12/05/2019       Recent Labs     12/06/19  2109 12/07/19  0740 12/07/19  1100 12/07/19  1557   POCGLU 228* 171* 305* 220*     · With hyperglycemia  · Will discontinue home dose of Victoza and metformin    · Continue sliding scale , Diabetic diet, adjusted to Lantus 35 units b i d   · Monitor Accu-Cheks and adjust regimen as needed  · Outpatient f/u with endocrinology at [facility]

## 2019-12-08 ENCOUNTER — APPOINTMENT (INPATIENT)
Dept: NEUROLOGY | Facility: CLINIC | Age: 54
DRG: 948 | End: 2019-12-08
Payer: COMMERCIAL

## 2019-12-08 LAB
GLUCOSE SERPL-MCNC: 199 MG/DL (ref 65–140)
GLUCOSE SERPL-MCNC: 223 MG/DL (ref 65–140)
GLUCOSE SERPL-MCNC: 241 MG/DL (ref 65–140)
GLUCOSE SERPL-MCNC: 243 MG/DL (ref 65–140)

## 2019-12-08 PROCEDURE — 82948 REAGENT STRIP/BLOOD GLUCOSE: CPT

## 2019-12-08 PROCEDURE — 99233 SBSQ HOSP IP/OBS HIGH 50: CPT | Performed by: INTERNAL MEDICINE

## 2019-12-08 PROCEDURE — 99232 SBSQ HOSP IP/OBS MODERATE 35: CPT | Performed by: PSYCHIATRY & NEUROLOGY

## 2019-12-08 PROCEDURE — 95816 EEG AWAKE AND DROWSY: CPT

## 2019-12-08 RX ADMIN — ENOXAPARIN SODIUM 40 MG: 40 INJECTION SUBCUTANEOUS at 08:20

## 2019-12-08 RX ADMIN — INSULIN LISPRO 2 UNITS: 100 INJECTION, SOLUTION INTRAVENOUS; SUBCUTANEOUS at 21:35

## 2019-12-08 RX ADMIN — OXYCODONE HYDROCHLORIDE AND ACETAMINOPHEN 500 MG: 500 TABLET ORAL at 08:18

## 2019-12-08 RX ADMIN — LOSARTAN POTASSIUM 100 MG: 50 TABLET, FILM COATED ORAL at 08:18

## 2019-12-08 RX ADMIN — INSULIN GLARGINE 35 UNITS: 100 INJECTION, SOLUTION SUBCUTANEOUS at 17:33

## 2019-12-08 RX ADMIN — INSULIN LISPRO 2 UNITS: 100 INJECTION, SOLUTION INTRAVENOUS; SUBCUTANEOUS at 11:30

## 2019-12-08 RX ADMIN — FOLIC ACID 1 MG: 1 TABLET ORAL at 08:18

## 2019-12-08 RX ADMIN — ATORVASTATIN CALCIUM 40 MG: 40 TABLET, FILM COATED ORAL at 17:33

## 2019-12-08 RX ADMIN — ASPIRIN 81 MG: 81 TABLET ORAL at 08:18

## 2019-12-08 RX ADMIN — FENOFIBRATE 145 MG: 145 TABLET, FILM COATED ORAL at 08:20

## 2019-12-08 RX ADMIN — PANTOPRAZOLE SODIUM 40 MG: 40 TABLET, DELAYED RELEASE ORAL at 05:19

## 2019-12-08 RX ADMIN — INSULIN LISPRO 2 UNITS: 100 INJECTION, SOLUTION INTRAVENOUS; SUBCUTANEOUS at 17:28

## 2019-12-08 RX ADMIN — INSULIN GLARGINE 35 UNITS: 100 INJECTION, SOLUTION SUBCUTANEOUS at 08:28

## 2019-12-08 RX ADMIN — FERROUS SULFATE TAB 325 MG (65 MG ELEMENTAL FE) 325 MG: 325 (65 FE) TAB at 08:18

## 2019-12-08 RX ADMIN — INSULIN LISPRO 3 UNITS: 100 INJECTION, SOLUTION INTRAVENOUS; SUBCUTANEOUS at 07:59

## 2019-12-08 NOTE — PLAN OF CARE
Problem: Prexisting or High Potential for Compromised Skin Integrity  Goal: Skin integrity is maintained or improved  Description  INTERVENTIONS:  - Identify patients at risk for skin breakdown  - Assess and monitor skin integrity  - Assess and monitor nutrition and hydration status  - Monitor labs   - Assess for incontinence   - Turn and reposition patient  - Assist with mobility/ambulation  - Relieve pressure over bony prominences  - Avoid friction and shearing  - Provide appropriate hygiene as needed including keeping skin clean and dry  - Evaluate need for skin moisturizer/barrier cream  - Collaborate with interdisciplinary team   - Patient/family teaching  - Consider wound care consult   Outcome: Progressing     Problem: SAFETY ADULT  Goal: Patient will remain free of falls  Description  INTERVENTIONS:  - Assess patient frequently for physical needs  -  Identify cognitive and physical deficits and behaviors that affect risk of falls    -  [place] fall precautions as indicated by assessment   - Educate patient/family on patient safety including physical limitations  - Instruct patient to call for assistance with activity based on assessment  - Modify environment to reduce risk of injury  - Consider OT/PT consult to assist with strengthening/mobility  Outcome: Progressing  Goal: Maintain or return to baseline ADL function  Description  INTERVENTIONS:  -  Assess patient's ability to carry out ADLs; assess patient's baseline for ADL function and identify physical deficits which impact ability to perform ADLs (bathing, care of mouth/teeth, toileting, grooming, dressing, etc )  - Assess/evaluate cause of self-care deficits   - Assess range of motion  - Assess patient's mobility; develop plan if impaired  - Assess patient's need for assistive devices and provide as appropriate  - Encourage maximum independence but intervene and supervise when necessary  - Involve family in performance of ADLs  - Assess for home care needs following discharge   - Consider OT consult to assist with ADL evaluation and planning for discharge  - Provide patient education as appropriate  Outcome: Progressing  Goal: Maintain or return mobility status to optimal level  Description  INTERVENTIONS:  - Assess patient's baseline mobility status (ambulation, transfers, stairs, etc )    - Identify cognitive and physical deficits and behaviors that affect mobility  - Identify mobility aids required to assist with transfers and/or ambulation (gait belt, sit-to-stand, lift, walker, cane, etc )  - [place] fall precautions as indicated by assessment  - Record patient progress and toleration of activity level on Mobility SBAR; progress patient to next Phase/Stage  - Instruct patient to call for assistance with activity based on assessment  - Consider rehabilitation consult to assist with strengthening/weightbearing, etc   Outcome: Progressing     Problem: DISCHARGE PLANNING  Goal: Discharge to home or other facility with appropriate resources  Description  INTERVENTIONS:  - Identify barriers to discharge w/patient and caregiver  - Arrange for needed discharge resources and transportation as appropriate  - Identify discharge learning needs (meds, wound care, etc )  - Arrange for interpretive services to assist at discharge as needed  - Refer to Case Management Department for coordinating discharge planning if the patient needs post-hospital services based on physician/advanced practitioner order or complex needs related to functional status, cognitive ability, or social support system  Outcome: Progressing

## 2019-12-08 NOTE — CASE MANAGEMENT
CM t/c to pt's wife Antoine [phone] to discuss CM role and possible d/c needs  Antoine reports pt does not have POA  Pt lives with wife in [facility]: [address]  Drives, employed and independent in [employer]  Denies DME, HHC and rehab  Denies hx inpatient mental health/substance abuse  CM following for d/c needs  CM reviewed d/c planning process including the following: identifying help at home, patient preference for d/c planning needs, Discharge Lounge, [facility] Meds to Bed program, availability of treatment team to discuss questions or concerns patient and/or family may have regarding understanding medications and recognizing signs and symptoms once discharged  CM also encouraged patient to follow up with all recommended appointments after discharge  Patient advised of importance for patient and family to participate in managing patients medical well being

## 2019-12-08 NOTE — PLAN OF CARE
Problem: Prexisting or High Potential for Compromised Skin Integrity  Goal: Skin integrity is maintained or improved  Description  INTERVENTIONS:  - Identify patients at risk for skin breakdown  - Assess and monitor skin integrity  - Assess and monitor nutrition and hydration status  - Monitor labs   - Assess for incontinence   - Turn and reposition patient  - Assist with mobility/ambulation  - Relieve pressure over bony prominences  - Avoid friction and shearing  - Provide appropriate hygiene as needed including keeping skin clean and dry  - Evaluate need for skin moisturizer/barrier cream  - Collaborate with interdisciplinary team   - Patient/family teaching  - Consider wound care consult   Outcome: Progressing     Problem: SAFETY ADULT  Goal: Patient will remain free of falls  Description  INTERVENTIONS:  - Assess patient frequently for physical needs  -  Identify cognitive and physical deficits and behaviors that affect risk of falls    -  [place] fall precautions as indicated by assessment   - Educate patient/family on patient safety including physical limitations  - Instruct patient to call for assistance with activity based on assessment  - Modify environment to reduce risk of injury  - Consider OT/PT consult to assist with strengthening/mobility  Outcome: Progressing  Goal: Maintain or return to baseline ADL function  Description  INTERVENTIONS:  -  Assess patient's ability to carry out ADLs; assess patient's baseline for ADL function and identify physical deficits which impact ability to perform ADLs (bathing, care of mouth/teeth, toileting, grooming, dressing, etc )  - Assess/evaluate cause of self-care deficits   - Assess range of motion  - Assess patient's mobility; develop plan if impaired  - Assess patient's need for assistive devices and provide as appropriate  - Encourage maximum independence but intervene and supervise when necessary  - Involve family in performance of ADLs  - Assess for home care needs following discharge   - Consider OT consult to assist with ADL evaluation and planning for discharge  - Provide patient education as appropriate  Outcome: Progressing  Goal: Maintain or return mobility status to optimal level  Description  INTERVENTIONS:  - Assess patient's baseline mobility status (ambulation, transfers, stairs, etc )    - Identify cognitive and physical deficits and behaviors that affect mobility  - Identify mobility aids required to assist with transfers and/or ambulation (gait belt, sit-to-stand, lift, walker, cane, etc )  - [place] fall precautions as indicated by assessment  - Record patient progress and toleration of activity level on Mobility SBAR; progress patient to next Phase/Stage  - Instruct patient to call for assistance with activity based on assessment  - Consider rehabilitation consult to assist with strengthening/weightbearing, etc   Outcome: Progressing     Problem: DISCHARGE PLANNING  Goal: Discharge to home or other facility with appropriate resources  Description  INTERVENTIONS:  - Identify barriers to discharge w/patient and caregiver  - Arrange for needed discharge resources and transportation as appropriate  - Identify discharge learning needs (meds, wound care, etc )  - Arrange for interpretive services to assist at discharge as needed  - Refer to Case Management Department for coordinating discharge planning if the patient needs post-hospital services based on physician/advanced practitioner order or complex needs related to functional status, cognitive ability, or social support system  Outcome: Progressing     Problem: Knowledge Deficit  Goal: Patient/family/caregiver demonstrates understanding of disease process, treatment plan, medications, and discharge instructions  Description  Complete learning assessment and assess knowledge base    Interventions:  - Provide teaching at level of understanding  - Provide teaching via preferred learning methods  Outcome: Progressing

## 2019-12-08 NOTE — ASSESSMENT & PLAN NOTE
· Intermittent, reportedly having episodes of "zoning out" while at work and performing daily activities as well as confusion and disorientation  Multiple episodes over past 2 weeks per wife  Unclear etiology  Patient currently alert and oriented x 3    · CT head, CTA head and neck, MRI of brain all unremarkable  · Status post lumbar puncture and everything at this point is negative  CSF culture remains pending  · Denies any new medications, elicit drug use  · Check UDS, ammonia, sed rate, CRP, lead Folate and B12 negative  · Appreciate neuro and psychiatry input     · CSF studies showed no WBC, minimal RBCs, Gram stain negative, minimal protein, meningitis panel pending  · This is likely dissociation from psychological stressors in patient's life recently, patient will likely need outpatient follow up with therapist

## 2019-12-08 NOTE — PROGRESS NOTES
Progress Note - Gideon Guo 1965, 53 y o  male MRN: [id number]    [facility] Encounter: [id number]    Primary Care Provider: Al Nieves MD   Date and time admitted to hospital: 12/5/2019  3:36 PM        EILEEN (obstructive sleep apnea)  Assessment & Plan  · Self-discontinued CPAP approximately 1 year ago  · Wife has been attempting to arrange repeat sleep study as outpatient  · ABG shows no hypercapnia    Tobacco use  Assessment & Plan  · NRT  · Encouraged cessation    Adjustment disorder with withdrawal  Assessment & Plan  Patient will require outpatient follow-up with Psychiatry and therapist    Mixed hyperlipidemia  Assessment & Plan  · On fenofibrate and statin    Essential hypertension  Assessment & Plan  · Continued Cozaar substitute patient's home dose of Avapro  · Controlled    Type 2 diabetes mellitus without complication, with long-term current use of insulin [facility])  Assessment & Plan  Lab Results   Component Value Date    HGBA1C 7 5 (H) 12/05/2019       Recent Labs     12/07/19  2055 12/08/19  0729 12/08/19  1102 12/08/19  1636   POCGLU 242* 241* 199* 223*     · With hyperglycemia  · Will discontinue home dose of Victoza and metformin  · Continue sliding scale , Diabetic diet, adjusted to Lantus 35 units b i d   · Monitor Accu-Cheks and adjust regimen as needed  · Outpatient f/u with endocrinology at [facility]  · Intermittent, reportedly having episodes of "zoning out" while at work and performing daily activities as well as confusion and disorientation  Multiple episodes over past 2 weeks per wife  Unclear etiology  Patient currently alert and oriented x 3    · CT head, CTA head and neck, MRI of brain all unremarkable  · Status post lumbar puncture and everything at this point is negative  CSF culture remains pending  · Denies any new medications, elicit drug use     · Check UDS, ammonia, sed rate, CRP, lead Folate and B12 negative  · Appreciate neuro and psychiatry input  · CSF studies showed no WBC, minimal RBCs, Gram stain negative, minimal protein, meningitis panel pending  · This is likely dissociation from psychological stressors in patient's life recently, patient will likely need outpatient follow up with therapist      VTE Pharmacologic Prophylaxis:   Pharmacologic: Enoxaparin (Lovenox)  Mechanical VTE Prophylaxis in Place: Yes    Patient Centered Rounds: I have performed bedside rounds with nursing staff today  Discussions with Specialists or Other Care Team Provider: neuro    Education and Discussions with Family / Patient: patient, jose    Time Spent for Care: 30 minutes  More than 50% of total time spent on counseling and coordination of care as described above  Current Length of Stay: 3 day(s)    Current Patient Status: Inpatient   Certification Statement: The patient will continue to require additional inpatient hospital stay due to awaiting rsults    Discharge Plan: home    Code Status: Level 1 - Full Code      Subjective:   No acute overnight events  Patient is improved today as per daughters, EEG done this morning  Objective:     Vitals:   Temp (24hrs), Av 9 °F (36 6 °C), Min:97 6 °F (36 4 °C), Max:98 1 °F (36 7 °C)    Temp:  [97 6 °F (36 4 °C)-98 1 °F (36 7 °C)] 98 °F (36 7 °C)  HR:  [62-76] 72  Resp:  [17] 17  BP: (117-145)/(69-80) 131/78  SpO2:  [96 %-98 %] 96 %  Body mass index is 27 89 kg/m²  Input and Output Summary (last 24 hours): Intake/Output Summary (Last 24 hours) at 2019 1650  Last data filed at 2019 1200  Gross per 24 hour   Intake 837 ml   Output 250 ml   Net 587 ml       Physical Exam:     Physical Exam   Constitutional: He is oriented to person, place, and time  He appears well-developed and well-nourished  HENT:   Head: Normocephalic and atraumatic  Mouth/Throat: Oropharynx is clear and moist    Eyes: Pupils are equal, round, and reactive to light   Conjunctivae are normal  Neck: Neck supple  No JVD present  Cardiovascular: Normal rate, regular rhythm, normal heart sounds and intact distal pulses  Pulmonary/Chest: Effort normal and breath sounds normal    Abdominal: Soft  Bowel sounds are normal    Musculoskeletal: He exhibits no edema or tenderness  Neurological: He is alert and oriented to person, place, and time  Skin: Skin is warm and dry  Capillary refill takes less than 2 seconds  Psychiatric: He has a normal mood and affect  His behavior is normal  Judgment and thought content normal    Nursing note and vitals reviewed  Additional Data:     Labs:    Results from last 7 days   Lab Units 12/06/19  1922   WBC Thousand/uL 11 64*   HEMOGLOBIN g/dL 14 2   HEMATOCRIT % 43 5   PLATELETS Thousands/uL 250   NEUTROS PCT % 63   LYMPHS PCT % 26   MONOS PCT % 7   EOS PCT % 3     Results from last 7 days   Lab Units 12/06/19  0422   SODIUM mmol/L 138   POTASSIUM mmol/L 3 9   CHLORIDE mmol/L 108   CO2 mmol/L 28   BUN mg/dL 13   CREATININE mg/dL 0 75   ANION GAP mmol/L 2*   CALCIUM mg/dL 8 5   ALBUMIN g/dL 3 7   TOTAL BILIRUBIN mg/dL 0 51   ALK PHOS U/L 77   ALT U/L 23   AST U/L 10   GLUCOSE RANDOM mg/dL 175*         Results from last 7 days   Lab Units 12/08/19  1636 12/08/19  1102 12/08/19  0729 12/07/19  2055 12/07/19  1557 12/07/19  1100 12/07/19  0740 12/06/19  2109 12/06/19  1615 12/06/19  1106 12/06/19  0716 12/05/19  2206   POC GLUCOSE mg/dl 223* 199* 241* 242* 220* 305* 171* 228* 284* 220* 303* 145*     Results from last 7 days   Lab Units 12/05/19  2334   HEMOGLOBIN A1C % 7 5*               * I Have Reviewed All Lab Data Listed Above  * Additional Pertinent Lab Tests Reviewed:  All Labs Within Last 24 Hours Reviewed    Imaging:    Imaging Reports Reviewed Today Include: n/a  Imaging Personally Reviewed by Myself Includes:  n/a    Recent Cultures (last 7 days):     Results from last 7 days   Lab Units 12/05/19  2207   GRAM STAIN RESULT  No bacteria seen  No polys seen       Last 24 Hours Medication List:     Current Facility-Administered Medications:  acetaminophen 650 mg Oral Q4H PRN Leonel Yee MD   aluminum-magnesium hydroxide-simethicone 30 mL Oral Q6H PRN Leonel Yee MD   ascorbic acid 500 mg Oral Daily Leonor Falk MD   aspirin 81 mg Oral Daily Leonel Yee MD   atorvastatin 40 mg Oral Daily With Abdoul Miller MD   docusate sodium 100 mg Oral BID PRN Leonel Yee MD   enoxaparin 40 mg Subcutaneous Q24H [address] EULA Prasad   fenofibrate 145 mg Oral Daily Leonel Yee MD   ferrous sulfate 325 mg Oral Daily With Breakfast Leonor Falk MD   folic acid 1 mg Oral Daily Leonor Falk MD   insulin glargine 35 Units Subcutaneous BID Leonor Falk MD   insulin lispro 1-5 Units Subcutaneous HS Leonel Yee MD   insulin lispro 1-6 Units Subcutaneous TID AC Leonel Yee MD   losartan 100 mg Oral Daily Leonel Yee MD   nicotine polacrilex 2 mg Oral Q2H PRN EULA Prasad   ondansetron 4 mg Intravenous Q6H PRN Leonel Yee MD   pantoprazole 40 mg Oral Early Morning Leonel Yee MD        Today, Patient Was Seen By: Leonor Falk MD    ** Please Note: Dictation voice to text software may have been used in the creation of this document   **

## 2019-12-08 NOTE — PROGRESS NOTES
Progress Note - Neurology   Gideon Ashby 53 y o  male MRN: [id number]  Unit/Bed#: [facility] 820-01 Encounter: [id number]    Assessment:  53year old male presenting with disorientation and changes in mental status     Plan:  1  Altered mental status    - Unclear etiology of symptoms, but strong suspicion that symptoms may be driven by underlying anxiety/depression/stressors     - MRI brain without contrast completed and no acute intracranial process noted  - EEG pending     - CSF studies thus far: protein 45, RBCs 2, WBCs 0, glucose 143, gram stain no polys/no bacteria  - CRP <3 0, ammonia 20, ESR 10, Vit B12 498, folate 19 8, lead <1 0     - RPR, SPEP, UPEP, Tau 14-3-3, heavy metals screen and Vit B1 pending     - Would recommend request psychiatry re-evaluate patient on Monday to establish care and a long term plan given family's concern with regard to him returning home in this state     - Outpatient formal neuro-cognitive assessment recommended  - Monitor exam and notify with changes  Subjective:   Patient notes he is feeling better today, visiting with his children and grandchildren at bedside  He notes that he is here in the hospital because of confusion  Per patient's daughter at bedside, patient is much-improved today  No episodes of confusion this morning while she has been visiting      ROS:  History obtained from the patient  General ROS: negative for - chills, fatigue or fever  Ophthalmic ROS: negative for - blurry vision, decreased vision or double vision  Respiratory ROS: negative for - cough or shortness of breath  Cardiovascular ROS: negative for - chest pain  Gastrointestinal ROS: negative for - abdominal pain or nausea/vomiting  Musculoskeletal ROS: negative for - muscular weakness  Neurological ROS: negative for - dizziness, headaches, numbness/tingling, speech problems, visual changes or weakness    Medications  Scheduled Meds:  Current Facility-Administered Medications:  acetaminophen 650 mg Oral Q4H PRN Shirley Odom MD   aluminum-magnesium hydroxide-simethicone 30 mL Oral Q6H PRN Shirley Odom MD   ascorbic acid 500 mg Oral Daily Donald Dias MD   aspirin 81 mg Oral Daily Shirley Odom MD   atorvastatin 40 mg Oral Daily With [place] MD Carissa   docusate sodium 100 mg Oral BID PRN Shirley Odom MD   enoxaparin 40 mg Subcutaneous Q24H [address] Sona EULA Gaines   fenofibrate 145 mg Oral Daily Shirley Odom MD   ferrous sulfate 325 mg Oral Daily With Breakfast Donald Dias MD   folic acid 1 mg Oral Daily Donald Dias MD   insulin glargine 35 Units Subcutaneous BID Donald Dias MD   insulin lispro 1-5 Units Subcutaneous HS Shirley Odom MD   insulin lispro 1-6 Units Subcutaneous TID AC Shirley Odom MD   losartan 100 mg Oral Daily Shirley Odom MD   nicotine polacrilex 2 mg Oral Q2H PRN Sona Kitchenkat EULA Vora   ondansetron 4 mg Intravenous Q6H PRN Shirley Odom MD   pantoprazole 40 mg Oral Early Morning Shirley Odom MD     Continuous Infusions:   PRN Meds:   acetaminophen    aluminum-magnesium hydroxide-simethicone    docusate sodium    nicotine polacrilex    ondansetron    Vitals: Blood pressure 145/80, pulse 76, temperature 98 1 °F (36 7 °C), resp  rate 17, height 5' 11" (1 803 m), weight 90 7 kg (200 lb), SpO2 96 %  ,Body mass index is 27 89 kg/m²      Physical Exam: /80   Pulse 76   Temp 98 1 °F (36 7 °C)   Resp 17   Ht 5' 11" (1 803 m)   Wt 90 7 kg (200 lb)   SpO2 96%   BMI 27 89 kg/m²   General appearance: alert and oriented, in no acute distress  Head: Normocephalic, without obvious abnormality, atraumatic  Eyes: negative findings: lids and lashes normal, conjunctivae and sclerae normal, pupils equal, round, reactive to light and accomodation and visual fields full to confrontation  Lungs: clear to auscultation bilaterally  Heart: regular rate and rhythm  Abdomen: soft, non-tender; bowel sounds normal; no masses,  no organomegaly  Extremities: extremities normal, warm and well-perfused; no cyanosis, clubbing, or edema  Skin: Skin color, texture, turgor normal  No rashes or lesions  Neurologic: Mental status: Alert, oriented, thought content appropriate, able to state why he is in the hospital, the current president's name, able to calculate how many quarters are in $2 75, able to spell word "world" forward and backward  Cranial nerves: II: visual field normal, II: pupils equal, round, reactive to light and accommodation, III,VII: ptosis no ptosis noted, III,IV,VI: extraocular muscles extra-ocular motions intact, VII: upper facial muscle function normal bilaterally, VII: lower facial muscle function normal bilaterally, XII: tongue strength normal  Sensory: normal, Grossly intact to crude touch  Motor: Motor strength 5/5 B/L UE and LE  Coordination: finger to nose normal bilaterally    Labs  Recent Results (from the past 24 hour(s))   Fingerstick Glucose (POCT)    Collection Time: 12/07/19 11:00 AM   Result Value Ref Range    POC Glucose 305 (H) 65 - 140 mg/dl   Blood gas, arterial    Collection Time: 12/07/19 12:23 PM   Result Value Ref Range    pH, Arterial 7 439 7 350 - 7 450    pCO2, Arterial 37 7 36 0 - 44 0 mm Hg    pO2, Arterial 95 8 75 0 - 129 0 mm Hg    HCO3, Arterial 25 0 22 0 - 28 0 mmol/L    Base Excess, Arterial 1 0 mmol/L    O2 Content, Arterial 21 0 16 0 - 23 0 mL/dL    O2 HGB,Arterial  96 9 94 0 - 97 0 %    SOURCE Radial, Right     SHAWN TEST Yes    Fingerstick Glucose (POCT)    Collection Time: 12/07/19  3:57 PM   Result Value Ref Range    POC Glucose 220 (H) 65 - 140 mg/dl   Fingerstick Glucose (POCT)    Collection Time: 12/07/19  8:55 PM   Result Value Ref Range    POC Glucose 242 (H) 65 - 140 mg/dl   Fingerstick Glucose (POCT)    Collection Time: 12/08/19  7:29 AM   Result Value Ref Range    POC Glucose 241 (H) 65 - 140 mg/dl     Imaging   No new neuro imaging available for review       VTE Prophylaxis: Enoxaparin (Lovenox)    Counseling / Coordination of Care  Total time spent today 25 minutes  Greater than 50% of total time was spent with the patient and / or family counseling and / or coordination of care  A description of the counseling / coordination of care: Discussed plan of care with patient and his family at bedside  EEG completed this morning, will await formal report  Continue to monitor neuro exam closely  Suspect symptoms may be related to underlying anxiety/depression/stressors  Family notes significant improvement today in mental status and episodes of confusion

## 2019-12-08 NOTE — ASSESSMENT & PLAN NOTE
Lab Results   Component Value Date    HGBA1C 7 5 (H) 12/05/2019       Recent Labs     12/07/19  2055 12/08/19  0729 12/08/19  1102 12/08/19  1636   POCGLU 242* 241* 199* 223*     · With hyperglycemia  · Will discontinue home dose of Victoza and metformin    · Continue sliding scale , Diabetic diet, adjusted to Lantus 35 units b i d   · Monitor Accu-Cheks and adjust regimen as needed  · Outpatient f/u with endocrinology at [facility]

## 2019-12-09 VITALS
HEIGHT: 71 IN | RESPIRATION RATE: 16 BRPM | WEIGHT: 200 LBS | SYSTOLIC BLOOD PRESSURE: 125 MMHG | TEMPERATURE: 97.8 F | DIASTOLIC BLOOD PRESSURE: 76 MMHG | HEART RATE: 73 BPM | OXYGEN SATURATION: 96 % | BODY MASS INDEX: 28 KG/M2

## 2019-12-09 LAB
ALBUMIN SERPL ELPH-MCNC: 4.24 G/DL (ref 3.5–5)
ALBUMIN SERPL ELPH-MCNC: 65.2 % (ref 52–65)
ALBUMIN UR ELPH-MCNC: 100 %
ALPHA1 GLOB MFR UR ELPH: 0 %
ALPHA1 GLOB SERPL ELPH-MCNC: 0.25 G/DL (ref 0.1–0.4)
ALPHA1 GLOB SERPL ELPH-MCNC: 3.9 % (ref 2.5–5)
ALPHA2 GLOB MFR UR ELPH: 0 %
ALPHA2 GLOB SERPL ELPH-MCNC: 0.66 G/DL (ref 0.4–1.2)
ALPHA2 GLOB SERPL ELPH-MCNC: 10.2 % (ref 7–13)
B-GLOBULIN MFR UR ELPH: 0 %
BACTERIA CSF CULT: NO GROWTH
BETA GLOB ABNORMAL SERPL ELPH-MCNC: 0.38 G/DL (ref 0.4–0.8)
BETA1 GLOB SERPL ELPH-MCNC: 5.8 % (ref 5–13)
BETA2 GLOB SERPL ELPH-MCNC: 4.3 % (ref 2–8)
BETA2+GAMMA GLOB SERPL ELPH-MCNC: 0.28 G/DL (ref 0.2–0.5)
GAMMA GLOB ABNORMAL SERPL ELPH-MCNC: 0.69 G/DL (ref 0.5–1.6)
GAMMA GLOB MFR UR ELPH: 0 %
GAMMA GLOB SERPL ELPH-MCNC: 10.6 % (ref 12–22)
GLUCOSE SERPL-MCNC: 126 MG/DL (ref 65–140)
GLUCOSE SERPL-MCNC: 253 MG/DL (ref 65–140)
IGG/ALB SER: 1.87 {RATIO} (ref 1.1–1.8)
PROT PATTERN SERPL ELPH-IMP: ABNORMAL
PROT PATTERN UR ELPH-IMP: NORMAL
PROT SERPL-MCNC: 6.5 G/DL (ref 6.4–8.2)
PROT UR-MCNC: 14 MG/DL
RPR SER QL: NORMAL

## 2019-12-09 PROCEDURE — 95816 EEG AWAKE AND DROWSY: CPT | Performed by: PSYCHIATRY & NEUROLOGY

## 2019-12-09 PROCEDURE — 99232 SBSQ HOSP IP/OBS MODERATE 35: CPT | Performed by: PSYCHIATRY & NEUROLOGY

## 2019-12-09 PROCEDURE — 82948 REAGENT STRIP/BLOOD GLUCOSE: CPT

## 2019-12-09 PROCEDURE — 99239 HOSP IP/OBS DSCHRG MGMT >30: CPT | Performed by: INTERNAL MEDICINE

## 2019-12-09 RX ORDER — ASCORBIC ACID 500 MG
500 TABLET ORAL DAILY
Qty: 30 TABLET | Refills: 0 | Status: SHIPPED | OUTPATIENT
Start: 2019-12-10 | End: 2020-11-10 | Stop reason: ALTCHOICE

## 2019-12-09 RX ORDER — FOLIC ACID 1 MG/1
1 TABLET ORAL DAILY
Qty: 30 TABLET | Refills: 0 | Status: SHIPPED | OUTPATIENT
Start: 2019-12-10 | End: 2020-11-10 | Stop reason: ALTCHOICE

## 2019-12-09 RX ORDER — FERROUS SULFATE 325(65) MG
325 TABLET ORAL
Qty: 30 TABLET | Refills: 0 | Status: SHIPPED | OUTPATIENT
Start: 2019-12-10 | End: 2020-11-10 | Stop reason: ALTCHOICE

## 2019-12-09 RX ADMIN — FOLIC ACID 1 MG: 1 TABLET ORAL at 08:02

## 2019-12-09 RX ADMIN — INSULIN LISPRO 3 UNITS: 100 INJECTION, SOLUTION INTRAVENOUS; SUBCUTANEOUS at 11:58

## 2019-12-09 RX ADMIN — PANTOPRAZOLE SODIUM 40 MG: 40 TABLET, DELAYED RELEASE ORAL at 05:45

## 2019-12-09 RX ADMIN — OXYCODONE HYDROCHLORIDE AND ACETAMINOPHEN 500 MG: 500 TABLET ORAL at 08:01

## 2019-12-09 RX ADMIN — ACETAMINOPHEN 650 MG: 325 TABLET ORAL at 10:13

## 2019-12-09 RX ADMIN — FENOFIBRATE 145 MG: 145 TABLET, FILM COATED ORAL at 08:02

## 2019-12-09 RX ADMIN — INSULIN GLARGINE 35 UNITS: 100 INJECTION, SOLUTION SUBCUTANEOUS at 08:01

## 2019-12-09 RX ADMIN — FERROUS SULFATE TAB 325 MG (65 MG ELEMENTAL FE) 325 MG: 325 (65 FE) TAB at 07:57

## 2019-12-09 RX ADMIN — ENOXAPARIN SODIUM 40 MG: 40 INJECTION SUBCUTANEOUS at 08:02

## 2019-12-09 RX ADMIN — LOSARTAN POTASSIUM 100 MG: 50 TABLET, FILM COATED ORAL at 08:02

## 2019-12-09 RX ADMIN — ASPIRIN 81 MG: 81 TABLET ORAL at 08:02

## 2019-12-09 NOTE — PLAN OF CARE
Problem: Prexisting or High Potential for Compromised Skin Integrity  Goal: Skin integrity is maintained or improved  Description  INTERVENTIONS:  - Identify patients at risk for skin breakdown  - Assess and monitor skin integrity  - Assess and monitor nutrition and hydration status  - Monitor labs   - Assess for incontinence   - Turn and reposition patient  - Assist with mobility/ambulation  - Relieve pressure over bony prominences  - Avoid friction and shearing  - Provide appropriate hygiene as needed including keeping skin clean and dry  - Evaluate need for skin moisturizer/barrier cream  - Collaborate with interdisciplinary team   - Patient/family teaching  - Consider wound care consult   Outcome: Progressing     Problem: SAFETY ADULT  Goal: Patient will remain free of falls  Description  INTERVENTIONS:  - Assess patient frequently for physical needs  -  Identify cognitive and physical deficits and behaviors that affect risk of falls    -  [place] fall precautions as indicated by assessment   - Educate patient/family on patient safety including physical limitations  - Instruct patient to call for assistance with activity based on assessment  - Modify environment to reduce risk of injury  - Consider OT/PT consult to assist with strengthening/mobility  Outcome: Progressing  Goal: Maintain or return to baseline ADL function  Description  INTERVENTIONS:  -  Assess patient's ability to carry out ADLs; assess patient's baseline for ADL function and identify physical deficits which impact ability to perform ADLs (bathing, care of mouth/teeth, toileting, grooming, dressing, etc )  - Assess/evaluate cause of self-care deficits   - Assess range of motion  - Assess patient's mobility; develop plan if impaired  - Assess patient's need for assistive devices and provide as appropriate  - Encourage maximum independence but intervene and supervise when necessary  - Involve family in performance of ADLs  - Assess for home care needs following discharge   - Consider OT consult to assist with ADL evaluation and planning for discharge  - Provide patient education as appropriate  Outcome: Progressing  Goal: Maintain or return mobility status to optimal level  Description  INTERVENTIONS:  - Assess patient's baseline mobility status (ambulation, transfers, stairs, etc )    - Identify cognitive and physical deficits and behaviors that affect mobility  - Identify mobility aids required to assist with transfers and/or ambulation (gait belt, sit-to-stand, lift, walker, cane, etc )  - [place] fall precautions as indicated by assessment  - Record patient progress and toleration of activity level on Mobility SBAR; progress patient to next Phase/Stage  - Instruct patient to call for assistance with activity based on assessment  - Consider rehabilitation consult to assist with strengthening/weightbearing, etc   Outcome: Progressing     Problem: DISCHARGE PLANNING  Goal: Discharge to home or other facility with appropriate resources  Description  INTERVENTIONS:  - Identify barriers to discharge w/patient and caregiver  - Arrange for needed discharge resources and transportation as appropriate  - Identify discharge learning needs (meds, wound care, etc )  - Arrange for interpretive services to assist at discharge as needed  - Refer to Case Management Department for coordinating discharge planning if the patient needs post-hospital services based on physician/advanced practitioner order or complex needs related to functional status, cognitive ability, or social support system  Outcome: Progressing     Problem: Knowledge Deficit  Goal: Patient/family/caregiver demonstrates understanding of disease process, treatment plan, medications, and discharge instructions  Description  Complete learning assessment and assess knowledge base    Interventions:  - Provide teaching at level of understanding  - Provide teaching via preferred learning methods  Outcome: Progressing

## 2019-12-09 NOTE — DISCHARGE SUMMARY
Discharge Summary - [address] [facility]    Patient Information: Yudelka Mariscal 53 y o  male MRN: [id number]  Unit/Bed#: [facility] 820-01 Encounter: [id number]    Discharging Physician / Practitioner: Pamela Dwyer MD  PCP: Villa Yepez MD  Admission Date: 12/5/2019  Discharge Date: 12/09/19    Reason for Admission:  Altered mental status    Discharge Diagnoses:     Principal Problem (Resolved):    Disorientation  Active Problems:    Type 2 diabetes mellitus without complication, with long-term current use of insulin ([address] )    Essential hypertension    Mixed hyperlipidemia    Adjustment disorder with withdrawal    Tobacco use    EILEEN (obstructive sleep apnea)  Resolved Problems:    Altered mental status      Consultations During Hospital Stay:  · Psychiatry  · Neurology    Procedures Performed:   · None    Significant Findings:   · Suspected Anxiety/depression  · Folate deficiency    Incidental Findings:   · None    Test Results Pending at Discharge (will require follow up): · None     Outpatient Tests Requested:  · None    Complications:  None    Hospital Course:     Yudelka Mariscal is a 53 y o  male patient who originally presented to the hospital on 12/5/2019 due to altered mental status  Psychiatry was consulted and determined that patient would not need inpatient psych admission and that he would benefit from individual therapy outpatient  Neurology was consulted for neurologic workup  EEG was obtained and showed mild irregularity and an outpatient EEG with sleep deprivation is ordered by Neurology  A brain MRI was negative CT head was negative, CSF studies were negative, CRP and ESR were negative  Vitamin B12 was within normal limits, folate was low, lead was low, RPR and was within normal limits  A Tau 14-3-3 heavy metals, SPEP, UPEP, vitamin B1 are pending but are highly unlikely to come back abnormal   The meningitis/encephalitis panel was negative    This was all discussed with patient and Neurology extensively and patient decided that he would like to go home and await further results given that they would likely be negative  It was recommended the patient get outpatient EEG and follow up with a neuropsych eval and a psychiatrist     Condition at Discharge: fair     Discharge Day Visit / Exam:     Subjective:  No acute overnight events  This morning patient is at his baseline he is alert and oriented x3 he is making jokes  Vitals: Blood Pressure: 125/76 (12/09/19 1520)  Pulse: 73 (12/09/19 1520)  Temperature: 97 8 °F (36 6 °C) (12/09/19 1520)  Temp Source: Oral (12/09/19 0744)  Respirations: 16 (12/09/19 1520)  Height: 5' 11" (180 3 cm) (12/06/19 1537)  Weight - Scale: 90 7 kg (200 lb) (12/06/19 1537)  SpO2: 96 % (12/09/19 1520)  Exam:   Physical Exam   Constitutional: He is oriented to person, place, and time  He appears well-developed and well-nourished  HENT:   Head: Normocephalic and atraumatic  Mouth/Throat: Oropharynx is clear and moist    Eyes: Pupils are equal, round, and reactive to light  Conjunctivae are normal    Neck: Neck supple  No JVD present  Cardiovascular: Normal rate, regular rhythm, normal heart sounds and intact distal pulses  Pulmonary/Chest: Effort normal and breath sounds normal    Abdominal: Soft  Bowel sounds are normal    Musculoskeletal: He exhibits no edema or tenderness  Neurological: He is alert and oriented to person, place, and time  Skin: Skin is warm and dry  Capillary refill takes less than 2 seconds  Psychiatric: He has a normal mood and affect  His behavior is normal  Judgment and thought content normal    Nursing note and vitals reviewed  Discharge instructions/Information to patient and family:   See after visit summary for information provided to patient and family  Provisions for Follow-Up Care:  See after visit summary for information related to follow-up care and any pertinent home health orders        Disposition:     Home    For Discharges to [facility] SNF:   · Not Applicable to this Patient - Not Applicable to this Patient    Planned Readmission: no     Discharge Statement:  I spent 38 minutes discharging the patient  This time was spent on the day of discharge  I had direct contact with the patient on the day of discharge  Greater than 50% of the total time was spent examining patient, answering all patient questions, arranging and discussing plan of care with patient as well as directly providing post-discharge instructions  Additional time then spent on discharge activities  Discharge Medications:  See after visit summary for reconciled discharge medications provided to patient and family  ** Please Note: Dragon 360 Dictation voice to text software may have been used in the creation of this document   **

## 2019-12-09 NOTE — PROGRESS NOTES
Progress Note - Neurology   Boy Shanks 53 y o  male [id number]  [facility]    Assessment:  Boy Shanks is a 53 y o  male with HTN, HLD, and DM who presented with disorientation and changes in mental status  Etiology unknown  Concern that symptoms may be due to underlying anxiety/depression/stressors, but cannot rule out seizures  EEG showed atypical appearing normal variant related to drowsiness, but there is a possibility that the burst represents fragments of epileptiform discharges  Outpatient sleep deprived EEG ordered  Patient is feeling much better today and is no longer altered  No further inpatient Neuro recommendations  Plan/Results:  - EEG: Bursts of theta activities with sharply contoured components during drowsiness likely a somewhat atypical appearing normal variant related to drowsiness, but the possibility that the burst represent fragments of epileptiform discharges is not excluded  If clinically indicated, a repeat EEG allowing for sleep, possibly after sleep deprivation or with prolonged recording, may provide additional diagnostic information     - outpatient EEG sleep deprived ordered  - MRI brain: no acute intracranial process  - pending: Tau 14-3-3, heavy metals, SPEP, UPEP, vitamin B1  - CSF studies: Protein 45, RBC 2, WBC 0, glucose 143, gram stain no polyps/nobacteria  - CRP <3 0, ammonia 20, ESR 10, Vit B12 498, folate 19 8 lead <1 0  - WNL: RPR, CRP, Sed rate  - Meningitis/encephalitis panel normal  - TSH normal  - No further neuro recommendations at this time  - Follow-up with neurology outpatient within 4-6 weeks  - All questions and concerns were addressed to the patient's satisfaction      Subjective:   Patient seen and examined with wife at bedside  No acute events reported overnight    Patient is "doing great" today and feels like he "just woke up from a long dream "  He can remember "flashes" from the last 4-5 days, such as having a conversation with his boss, going in the ambulance, and looking a the board in his hospital room, but he cannot recall the exact events since his hospitalization  He has some pressure in his frontal sinuses, but denies visual disturbances, arm/leg weakness, numbness/tingling, dizziness, lightheadedness, chest pain, shortness of breath, and abdominal pain  He cannot recall what started all of these events  His wife states that he is back to his baseline  Past Medical History:   Diagnosis Date    Diabetes mellitus ([facility] [address] )     GERD (gastroesophageal reflux disease)     Hyperlipidemia     Hypertension      History reviewed  No pertinent surgical history  History reviewed  No pertinent family history    Social History     Socioeconomic History    Marital status: /Civil Union     Spouse name: None    Number of children: None    Years of education: None    Highest education level: None   Occupational History    None   Social Needs    Financial resource strain: None    Food insecurity:     Worry: None     Inability: None    Transportation needs:     Medical: None     Non-medical: None   Tobacco Use    Smoking status: Current Every Day Smoker     Packs/day: 0 50     Types: Cigarettes    Smokeless tobacco: Current User   Substance and Sexual Activity    Alcohol use: Yes     Frequency: Never    Drug use: Never    Sexual activity: None   Lifestyle    Physical activity:     Days per week: None     Minutes per session: None    Stress: None   Relationships    Social connections:     Talks on phone: None     Gets together: None     Attends Taoism service: None     Active member of club or organization: None     Attends meetings of clubs or organizations: None     Relationship status: None    Intimate partner violence:     Fear of current or ex partner: None     Emotionally abused: None     Physically abused: None     Forced sexual activity: None   Other Topics Concern    None   Social History Narrative    None         Medications: All current active meds have been reviewed and current meds:  Scheduled Meds:  Current Facility-Administered Medications:  acetaminophen 650 mg Oral Q4H PRN Will MD Lisa   aluminum-magnesium hydroxide-simethicone 30 mL Oral Q6H PRN Will MD Lisa   ascorbic acid 500 mg Oral Daily Anthony Caldwell MD   aspirin 81 mg Oral Daily Will MD Lisa   atorvastatin 40 mg Oral Daily With Kamila Rodríguez MD   docusate sodium 100 mg Oral BID PRN Will MD Lisa   enoxaparin 40 mg Subcutaneous Q24H [facility] EULA Prasad   fenofibrate 145 mg Oral Daily Will MD Lisa   ferrous sulfate 325 mg Oral Daily With Breakfast Anthony Caldwell MD   folic acid 1 mg Oral Daily Anthony Caldwell MD   insulin glargine 35 Units Subcutaneous BID Anthony Caldwell MD   insulin lispro 1-5 Units Subcutaneous HS Will MD Lisa   insulin lispro 1-6 Units Subcutaneous TID AC Will MD Lisa   losartan 100 mg Oral Daily Will MD Lisa   nicotine polacrilex 2 mg Oral Q2H PRN EULA Cruz   ondansetron 4 mg Intravenous Q6H PRN Will MD Lisa   pantoprazole 40 mg Oral Early Morning Will MD Lsia     Continuous Infusions:   PRN Meds:   acetaminophen    aluminum-magnesium hydroxide-simethicone    docusate sodium    nicotine polacrilex    ondansetron       ROS:   Review of Systems   Constitutional: Negative for appetite change, diaphoresis, fatigue and fever  HENT: Negative for hearing loss, trouble swallowing and voice change  Eyes: Negative for photophobia and visual disturbance  Respiratory: Negative for chest tightness and shortness of breath  Cardiovascular: Negative for chest pain and palpitations  Gastrointestinal: Negative for abdominal pain, constipation, diarrhea, nausea and vomiting  Genitourinary: Negative for difficulty urinating, flank pain and urgency  Musculoskeletal: Negative for back pain, gait problem, myalgias and neck pain     Skin: Negative for color change  Neurological: Negative for dizziness, tremors, speech difficulty, weakness, light-headedness, numbness and headaches  Psychiatric/Behavioral: Negative for agitation, behavioral problems and decreased concentration  Vitals:   /75   Pulse 64   Temp 98 2 °F (36 8 °C) (Oral)   Resp 18   Ht 5' 11" (1 803 m)   Wt 90 7 kg (200 lb)   SpO2 95%   BMI 27 89 kg/m²     Physical Exam:   Physical Exam   Constitutional: He is oriented to person, place, and time  He appears well-developed and well-nourished  No distress  Patient lying comfortably in bed with wife at his bedside   HENT:   Head: Normocephalic and atraumatic  Right Ear: External ear normal    Left Ear: External ear normal    Nose: Nose normal    Mouth/Throat: Oropharynx is clear and moist  No oropharyngeal exudate  Eyes: Pupils are equal, round, and reactive to light  Conjunctivae and EOM are normal    Neck: Normal range of motion  Neck supple  Cardiovascular: Normal rate and regular rhythm  Pulmonary/Chest: Effort normal and breath sounds normal    Abdominal: Soft  Bowel sounds are normal    Musculoskeletal: Normal range of motion  Neurological: He is alert and oriented to person, place, and time  He has a normal Finger-Nose-Finger Test  Gait normal    Reflex Scores:       Bicep reflexes are 3+ on the right side and 3+ on the left side  Brachioradialis reflexes are 3+ on the left side  Patellar reflexes are 3+ on the right side and 3+ on the left side  Achilles reflexes are 2+ on the right side and 2+ on the left side  Skin: Skin is warm and dry  Capillary refill takes less than 2 seconds  He is not diaphoretic  Psychiatric: He has a normal mood and affect  His speech is normal and behavior is normal  Judgment and thought content normal    Nursing note and vitals reviewed  Neurologic Exam     Mental Status   Oriented to person, place, and time     Oriented to year, month and date    Registration: recalls 3 of 3 objects  Recall at 5 minutes: recalls 3 of 3 objects  Follows 2 step commands  Attention: normal  Concentration: normal    Speech: speech is normal   Level of consciousness: alert  Knowledge: good  Able to name object  Able to repeat  Normal comprehension  Cranial Nerves     CN II   Visual fields full to confrontation  CN III, IV, VI   Pupils are equal, round, and reactive to light  Extraocular motions are normal    Right pupil: Size: 3 mm  Shape: regular  Reactivity: brisk  Consensual response: intact  Left pupil: Size: 3 mm  Shape: regular  Reactivity: brisk  Consensual response: intact  Nystagmus: none   Diplopia: none    CN V   Facial sensation intact  CN VII   Facial expression full, symmetric  CN VIII   Hearing: intact    CN IX, X   Palate: symmetric    CN XI   Right sternocleidomastoid strength: normal  Left sternocleidomastoid strength: normal  Right trapezius strength: normal  Left trapezius strength: normal    CN XII   Tongue: not atrophic  Fasciculations: absent  Tongue deviation: none    Motor Exam   Muscle bulk: normal  Overall muscle tone: normal  Right arm pronator drift: absent  Left arm pronator drift: absent  5/5 strength in UE and LE bilaterally  Sensory Exam   Sensation to light touch, temperature, and vibration intact in UE and LE bilaterally  Gait, Coordination, and Reflexes     Gait  Gait: normal    Coordination   Finger to nose coordination: normal    Tremor   Resting tremor: absent  Action tremor: absent    Reflexes   Left brachioradialis: 3+  Right biceps: 3+  Left biceps: 3+  Right patellar: 3+  Left patellar: 3+  Right achilles: 2+  Left achilles: 2+  Right plantar: normal  Left plantar: normal  Right ankle clonus: absent  Left ankle clonus: absent          Labs: I have personally reviewed pertinent reports     Recent Results (from the past 24 hour(s))   Fingerstick Glucose (POCT)    Collection Time: 12/08/19  4:36 PM Result Value Ref Range    POC Glucose 223 (H) 65 - 140 mg/dl   Fingerstick Glucose (POCT)    Collection Time: 12/08/19  8:58 PM   Result Value Ref Range    POC Glucose 243 (H) 65 - 140 mg/dl   Fingerstick Glucose (POCT)    Collection Time: 12/09/19  7:43 AM   Result Value Ref Range    POC Glucose 126 65 - 140 mg/dl   Fingerstick Glucose (POCT)    Collection Time: 12/09/19 11:25 AM   Result Value Ref Range    POC Glucose 253 (H) 65 - 140 mg/dl       Imaging: I have personally reviewed pertinent imaging in PACS, including EEG, MRI brain, CTA head,  and I have personally reviewed PACS reports  EKG, Pathology, and Other Studies: I have personally reviewed pertinent reports  VTE Prophylaxis: Sequential compression device (Venodyne)  and Enoxaparin (Lovenox)      Counseling / Coordination of Care  Total time spent today 20 minutes  Greater than 50% of total time was spent with the patient and/or family counseling and/or coordination of care  A description of the counseling/coordination of care:  Patient was seen and evaluated  Discussed with attending  Chart reviewed thoroughly including laboratory and imaging studies    Plan of care discussed with patient and primary team

## 2019-12-10 LAB
ARSENIC BLD-MCNC: 6 UG/L (ref 2–23)
CADMIUM BLD-MCNC: NORMAL UG/L (ref 0–1.2)
LEAD BLD-MCNC: NORMAL UG/DL (ref 0–4)
MERCURY BLD-MCNC: 1.6 UG/L (ref 0–14.9)
VIT B1 BLD-SCNC: 161.4 NMOL/L (ref 66.5–200)

## 2019-12-10 NOTE — UTILIZATION REVIEW
Notification of Discharge  This is a Notification of Discharge from our facility [address]  Please be advised that this patient has been discharge from our facility  Below you will find the admission and discharge date and time including the patients disposition  PRESENTATION DATE: 12/5/2019  3:36 PM  OBS ADMISSION DATE:   IP ADMISSION DATE: 12/5/19 2229   DISCHARGE DATE: 12/9/2019  5:54 PM  DISPOSITION: Home/Self Care Home/Self Care   Admission Orders listed below:  Admission Orders (From admission, onward)     Ordered        12/05/19 2236  Inpatient Admission  Once                   Please contact the UR Department if additional information is required to close this patient's authorization/case  [address] Utilization Review Department  Main: [phone] x carefully listen to the prompts  All voicemails are confidential   [email or web address]  Send all requests for admission clinical reviews, approved or denied determinations and any other requests to dedicated fax number below belonging to the campus where the patient is receiving treatment   List of dedicated fax numbers:  [address] DENIALS (Administrative/Medical Necessity) [phone]   [address] [place] [address] (Maternity/NICU/Pediatrics) [phone]   [facility] [phone]   Adal Oka [phone]   [address] [place] [address] [phone]   [address] [place] [address] [phone]   Gardner Flow [phone]   [facility]  [phone]   Leah Gavin [address] Tavo Pruitt tahira  [phone]

## 2019-12-13 NOTE — UTILIZATION REVIEW
Date:  12/13/19    To:   Dr Dev Moreland, Medical Director    From:  Dr Kavita Hardwick, Physician Advisor      Additional clinical as requested:    Discharge Diagnoses:      Principal Problem (Resolved):    Disorientation  Active Problems:    Type 2 diabetes mellitus without complication, with long-term current use of insulin ([address] )    Essential hypertension    Mixed hyperlipidemia    Adjustment disorder with withdrawal    Tobacco use    EILEEN (obstructive sleep apnea)  Resolved Problems:    Altered mental status    Hospital Course:      Kavita Verdugo is a 53 y o  male patient who originally presented to the hospital on 12/5/2019 due to altered mental status  Psychiatry was consulted and determined that patient would not need inpatient psych admission and that he would benefit from individual therapy outpatient  Neurology was consulted for neurologic workup  EEG was obtained and showed mild irregularity and an outpatient EEG with sleep deprivation is ordered by Neurology  A brain MRI was negative CT head was negative, CSF studies were negative, CRP and ESR were negative  Vitamin B12 was within normal limits, folate was low, lead was low, RPR and was within normal limits  A Tau 14-3-3 heavy metals, SPEP, UPEP, vitamin B1 are pending but are highly unlikely to come back abnormal   The meningitis/encephalitis panel was negative  This was all discussed with patient and Neurology extensively and patient decided that he would like to go home and await further results given that they would likely be negative  It was recommended the patient get outpatient EEG and follow up with a neuropsych eval and a psychiatrist     ===========================================================    12/8 Hospitalist Progress Note:    * Disorientation  Assessment & Plan  · Intermittent, reportedly having episodes of "zoning out" while at work and performing daily activities as well as confusion and disorientation   Multiple episodes over past 2 weeks per wife  Unclear etiology  Patient currently alert and oriented x 3    · CT head, CTA head and neck, MRI of brain all unremarkable  · Status post lumbar puncture and everything at this point is negative  CSF culture remains pending  · Denies any new medications, elicit drug use  · Check UDS, ammonia, sed rate, CRP, lead Folate and B12 negative  · Appreciate neuro and psychiatry input  · CSF studies showed no WBC, minimal RBCs, Gram stain negative, minimal protein, meningitis panel pending  · This is likely dissociation from psychological stressors in patient's life recently, patient will likely need outpatient follow up with therapist    VTE Pharmacologic Prophylaxis:   Pharmacologic: Enoxaparin (Lovenox)  Mechanical VTE Prophylaxis in Place: Yes      Current Length of Stay: 3 day(s)     Current Patient Status: Inpatient   Certification Statement: The patient will continue to require additional inpatient hospital stay due to awaiting rsults     Discharge Plan: home     Subjective:   No acute overnight events  Patient is improved today as per daughters, EEG done this morning     ==========================================================    ELECTROENCEPHALOGRAM (EEG)        Patient Name:  Kavita Verdugo  MRN: [id number]   :  1965 File #: [id number]   Age: 53 y o  Ordering Provider: Mino Hawley MD   Date performed: 2019                                                                        Report date: 2019                                                                       Study type: Routine EEG     ICD 10 diagnosis: Transient alteration of awareness R40 4     Start time:  End time: 8470      -------------------------------------------------------------------------------------------------------------------   Patient History:   This recording was performed in a 53 y o  male to determine whether spells of confusion are seizures       Medications include:   No AEDs    -------------------------------------------------------------------------------------------------------------------   Description of Procedure:  · 32 channel digital recording with electrodes placed according to the International 10-20 system with additional T1/T2 electrodes, EOG, EKG, and simultaneous video  The recording was technically satisfactory      -------------------------------------------------------------------------------------------------------------------   EEG Description:     The recording was performed with the patient awake and drowsy  He was oriented      During wakefulness, there were long runs of well regulated, low amplitude, posteriorly dominant, symmetric 9 cps alpha rhythm that attenuated with eye opening  There were symmetric low amplitude, frontally dominant beta activities       With drowsiness, alpha activity attenuated and there was diffusely distributed theta activities  There were vertex waves, but other features of stage 2 sleep were not present  During drowsiness there were intermittent bursts of diffuse, centrally predominant, low to medium amplitude theta near 6 cps with intermixed alpha and sharp components       Activation Procedures:  Hyperventilation was performed for 3-4 minutes and did not produce any changes       Stepped photic stimulation between 1-30 cps induced symmetric photic driving       Other findings:  Samples of the single channel ECG demonstrated a regular rhythm      Events:   No significant push buttons were activated  -------------------------------------------------------------------------------------------------------------------   EEG Interpretation:   This Routine EEG recorded during wakefulness and drowsiness is essentially normal      Bursts of theta activities with sharply contoured components during drowsiness likely a somewhat atypical appearing normal variant related to drowsiness, but the possibility that the burst represent fragments of epileptiform discharges is not excluded  If clinically indicated, a repeat EEG allowing for sleep, possibly after sleep deprivation or with prolonged recording, may provide additional diagnostic information        Bozena Saunders MD   [address] [facility]     12/7 Neurology Note:    Attestation signed by Lilliam Rodarte DO at 12/7/2019 6:14 PM (Updated)        Reports feeling like he is "dreaming" his current events and his hospitalization  Becomes tearful and anxious with discussion of any personal issues  No neurologic deficit noted  Awake, alert, and oriented although repeatedly asks why he is here and that this feels like a dream to him      Work-up thus far unremarkable  EEG pending  Strongly suspect his presentation is primarily behavioral in etiology and was precipitated by significant stressors in his personal life, causing him to dissociate from them  Would recommend psychiatric re-evaluation Monday to establish care and a long-term plan given family's concerns with regard to returning home in this state

## 2020-01-10 LAB — MISCELLANEOUS LAB TEST RESULT: NORMAL

## 2020-04-03 ENCOUNTER — TELEPHONE (OUTPATIENT)
Dept: FAMILY MEDICINE CLINIC | Facility: CLINIC | Age: 55
End: 2020-04-03

## 2020-06-04 ENCOUNTER — DOCTOR'S OFFICE (OUTPATIENT)
Dept: URBAN - METROPOLITAN AREA CLINIC 137 | Facility: CLINIC | Age: 55
Setting detail: OPHTHALMOLOGY
End: 2020-06-04
Payer: COMMERCIAL

## 2020-06-04 DIAGNOSIS — H25.13: ICD-10-CM

## 2020-06-04 PROBLEM — E11.9 DIABETES TYPE 2 NO RETINOPATHY: Status: ACTIVE | Noted: 2017-04-06

## 2020-06-04 PROCEDURE — 92014 COMPRE OPH EXAM EST PT 1/>: CPT | Performed by: OPTOMETRIST

## 2020-06-04 ASSESSMENT — VISUAL ACUITY
OD_BCVA: 20/25
OS_BCVA: 20/20-1

## 2020-06-04 ASSESSMENT — REFRACTION_MANIFEST
OS_CYLINDER: SPH
OD_VA1: 20/20
OD_VA2: 20/20
OD_CYLINDER: +0.25
OS_VA2: 20/20
OD_SPHERE: +0.25
OD_ADD: +1.75
OD_AXIS: 143
OS_VA1: 20/20
OS_ADD: +1.75
OS_SPHERE: +0.50

## 2020-06-04 ASSESSMENT — SPHEQUIV_DERIVED
OD_SPHEQUIV: 0.375
OD_SPHEQUIV: 0.375
OS_SPHEQUIV: 0.625

## 2020-06-04 ASSESSMENT — REFRACTION_CURRENTRX
OS_VPRISM_DIRECTION: SV
OD_OVR_VA: 20/
OD_VPRISM_DIRECTION: SV
OS_CYLINDER: SPH
OD_CYLINDER: SPH
OS_SPHERE: +2.00
OS_OVR_VA: 20/
OD_SPHERE: +2.00

## 2020-06-04 ASSESSMENT — REFRACTION_AUTOREFRACTION
OS_CYLINDER: +0.25
OD_SPHERE: +0.25
OS_AXIS: 154
OD_AXIS: 136
OS_SPHERE: +0.50
OD_CYLINDER: +0.25

## 2020-06-04 ASSESSMENT — CONFRONTATIONAL VISUAL FIELD TEST (CVF)
OS_FINDINGS: FULL
OD_FINDINGS: FULL

## 2020-07-19 PROBLEM — K21.9 GERD (GASTROESOPHAGEAL REFLUX DISEASE): Status: ACTIVE | Noted: 2020-07-19

## 2020-07-19 PROBLEM — K63.5 COLON POLYP: Status: ACTIVE | Noted: 2020-07-19

## 2020-07-19 PROBLEM — F41.1 GAD (GENERALIZED ANXIETY DISORDER): Status: ACTIVE | Noted: 2020-07-19

## 2020-08-01 ENCOUNTER — APPOINTMENT (OUTPATIENT)
Dept: LAB | Facility: HOSPITAL | Age: 55
End: 2020-08-01
Payer: COMMERCIAL

## 2020-08-01 ENCOUNTER — TRANSCRIBE ORDERS (OUTPATIENT)
Dept: LAB | Facility: HOSPITAL | Age: 55
End: 2020-08-01

## 2020-08-01 DIAGNOSIS — E78.2 MIXED HYPERLIPIDEMIA: ICD-10-CM

## 2020-08-01 DIAGNOSIS — E78.2 MIXED HYPERLIPIDEMIA: Primary | ICD-10-CM

## 2020-08-01 LAB
ALBUMIN SERPL BCP-MCNC: 4.3 G/DL (ref 3.4–4.8)
ALP SERPL-CCNC: 66.4 U/L (ref 10–129)
ALT SERPL W P-5'-P-CCNC: 20 U/L (ref 5–63)
ANION GAP SERPL CALCULATED.3IONS-SCNC: 6 MMOL/L (ref 4–13)
AST SERPL W P-5'-P-CCNC: 20 U/L (ref 15–41)
BILIRUB SERPL-MCNC: 0.47 MG/DL (ref 0.3–1.2)
BUN SERPL-MCNC: 18 MG/DL (ref 6–20)
CALCIUM SERPL-MCNC: 9.2 MG/DL (ref 8.4–10.2)
CHLORIDE SERPL-SCNC: 103 MMOL/L (ref 96–108)
CHOLEST SERPL-MCNC: 106 MG/DL
CO2 SERPL-SCNC: 31 MMOL/L (ref 22–33)
CREAT SERPL-MCNC: 0.76 MG/DL (ref 0.5–1.2)
CREAT UR-MCNC: 250 MG/DL
EST. AVERAGE GLUCOSE BLD GHB EST-MCNC: 163 MG/DL
GFR SERPL CREATININE-BSD FRML MDRD: 102 ML/MIN/1.73SQ M
GLUCOSE P FAST SERPL-MCNC: 93 MG/DL (ref 65–99)
HBA1C MFR BLD: 7.3 %
HDLC SERPL-MCNC: 29 MG/DL
LDLC SERPL CALC-MCNC: 58 MG/DL (ref 0–100)
MICROALBUMIN UR-MCNC: 61.1 MG/L (ref 0–20)
MICROALBUMIN/CREAT 24H UR: 24 MG/G CREATININE (ref 0–30)
NONHDLC SERPL-MCNC: 77 MG/DL
POTASSIUM SERPL-SCNC: 4.2 MMOL/L (ref 3.5–5)
PROT SERPL-MCNC: 6.5 G/DL (ref 6.4–8.3)
SODIUM SERPL-SCNC: 140 MMOL/L (ref 133–145)
TRIGL SERPL-MCNC: 93.3 MG/DL
TSH SERPL DL<=0.05 MIU/L-ACNC: 2.29 UIU/ML (ref 0.34–5.6)

## 2020-08-01 PROCEDURE — 82043 UR ALBUMIN QUANTITATIVE: CPT

## 2020-08-01 PROCEDURE — 80061 LIPID PANEL: CPT

## 2020-08-01 PROCEDURE — 82570 ASSAY OF URINE CREATININE: CPT

## 2020-08-01 PROCEDURE — 80053 COMPREHEN METABOLIC PANEL: CPT

## 2020-08-01 PROCEDURE — 36415 COLL VENOUS BLD VENIPUNCTURE: CPT

## 2020-08-01 PROCEDURE — 84443 ASSAY THYROID STIM HORMONE: CPT

## 2020-08-01 PROCEDURE — 83036 HEMOGLOBIN GLYCOSYLATED A1C: CPT

## 2020-08-11 DIAGNOSIS — E78.5 DYSLIPIDEMIA: Primary | ICD-10-CM

## 2020-08-11 RX ORDER — ROSUVASTATIN CALCIUM 20 MG/1
TABLET, COATED ORAL
Qty: 30 TABLET | Refills: 5 | Status: SHIPPED | OUTPATIENT
Start: 2020-08-11 | End: 2020-12-10 | Stop reason: SDUPTHER

## 2020-08-18 ENCOUNTER — TELEPHONE (OUTPATIENT)
Dept: FAMILY MEDICINE CLINIC | Facility: CLINIC | Age: 55
End: 2020-08-18

## 2020-08-18 DIAGNOSIS — F41.9 ANXIETY: Primary | ICD-10-CM

## 2020-08-18 RX ORDER — CITALOPRAM 10 MG/1
10 TABLET ORAL DAILY
Qty: 90 TABLET | Refills: 2 | Status: SHIPPED | OUTPATIENT
Start: 2020-08-18 | End: 2021-05-15 | Stop reason: SDUPTHER

## 2020-08-18 RX ORDER — CITALOPRAM 10 MG/1
10 TABLET ORAL DAILY
COMMUNITY
End: 2020-08-18 | Stop reason: SDUPTHER

## 2020-09-15 ENCOUNTER — OFFICE VISIT (OUTPATIENT)
Dept: FAMILY MEDICINE CLINIC | Facility: CLINIC | Age: 55
End: 2020-09-15
Payer: COMMERCIAL

## 2020-09-15 VITALS
OXYGEN SATURATION: 98 % | WEIGHT: 217 LBS | HEIGHT: 71 IN | TEMPERATURE: 97.8 F | BODY MASS INDEX: 30.38 KG/M2 | HEART RATE: 80 BPM | DIASTOLIC BLOOD PRESSURE: 70 MMHG | RESPIRATION RATE: 16 BRPM | SYSTOLIC BLOOD PRESSURE: 100 MMHG

## 2020-09-15 DIAGNOSIS — I10 ESSENTIAL HYPERTENSION: Primary | ICD-10-CM

## 2020-09-15 DIAGNOSIS — Z12.5 PROSTATE CANCER SCREENING: ICD-10-CM

## 2020-09-15 DIAGNOSIS — Z79.4 TYPE 2 DIABETES MELLITUS WITHOUT COMPLICATION, WITH LONG-TERM CURRENT USE OF INSULIN (HCC): ICD-10-CM

## 2020-09-15 DIAGNOSIS — E78.2 MIXED HYPERLIPIDEMIA: ICD-10-CM

## 2020-09-15 DIAGNOSIS — K21.9 GASTROESOPHAGEAL REFLUX DISEASE, ESOPHAGITIS PRESENCE NOT SPECIFIED: ICD-10-CM

## 2020-09-15 DIAGNOSIS — K63.5 POLYP OF COLON, UNSPECIFIED PART OF COLON, UNSPECIFIED TYPE: ICD-10-CM

## 2020-09-15 DIAGNOSIS — E11.9 TYPE 2 DIABETES MELLITUS WITHOUT COMPLICATION, WITH LONG-TERM CURRENT USE OF INSULIN (HCC): ICD-10-CM

## 2020-09-15 DIAGNOSIS — Z23 ENCOUNTER FOR IMMUNIZATION: ICD-10-CM

## 2020-09-15 DIAGNOSIS — F41.1 GAD (GENERALIZED ANXIETY DISORDER): ICD-10-CM

## 2020-09-15 PROCEDURE — 3078F DIAST BP <80 MM HG: CPT

## 2020-09-15 PROCEDURE — 3725F SCREEN DEPRESSION PERFORMED: CPT

## 2020-09-15 PROCEDURE — 90686 IIV4 VACC NO PRSV 0.5 ML IM: CPT

## 2020-09-15 PROCEDURE — 99214 OFFICE O/P EST MOD 30 MIN: CPT

## 2020-09-15 PROCEDURE — 90471 IMMUNIZATION ADMIN: CPT

## 2020-09-15 RX ORDER — INSULIN GLARGINE 100 [IU]/ML
35 INJECTION, SOLUTION SUBCUTANEOUS EVERY 12 HOURS SCHEDULED
COMMUNITY
Start: 2020-05-28

## 2020-09-15 RX ORDER — CITALOPRAM 40 MG/1
TABLET ORAL
COMMUNITY
End: 2020-09-15

## 2020-09-15 RX ORDER — ROSUVASTATIN CALCIUM 20 MG/1
TABLET, COATED ORAL
COMMUNITY
End: 2020-09-15

## 2020-09-15 NOTE — ASSESSMENT & PLAN NOTE
A1C 7 3 in Aug 2020  Microalb + in Aug 2020  Cont meds and low carb diet  Had eye exam earlier this year  Flu shot given      Lab Results   Component Value Date    HGBA1C 7 3 (H) 08/01/2020

## 2020-09-15 NOTE — PROGRESS NOTES
BMI Counseling: Body mass index is 30 27 kg/m²  The BMI is above normal  Nutrition recommendations include decreasing portion sizes, encouraging healthy choices of fruits and vegetables, consuming healthier snacks and moderation in carbohydrate intake  Exercise recommendations include exercising 3-5 times per week  No pharmacotherapy was ordered  Assessment/Plan:         Problem List Items Addressed This Visit        Digestive    GERD (gastroesophageal reflux disease)     No recent GERD  Cont med qd  Colon polyps     Last colo was Jan 2019 and had 4 polyps  Recheck in Jan 2022  Endocrine    Type 2 diabetes mellitus without complication, with long-term current use of insulin (HCC)     A1C 7 3 in Aug 2020  Microalb + in Aug 2020  Cont meds and low carb diet  Had eye exam earlier this year  Flu shot given  Lab Results   Component Value Date    HGBA1C 7 3 (H) 08/01/2020            Relevant Medications    insulin glargine (Lantus SoloStar) 100 units/mL injection pen       Cardiovascular and Mediastinum    Essential hypertension - Primary     BP good  Cont meds  Other    Mixed hyperlipidemia     LDL 58, HDL 29, and Tgs 93 in Aug 2020  Cont meds  PIERRE (generalized anxiety disorder)     Stress level ok  Cont med  Not seeing therapist anymore  Other Visit Diagnoses     Encounter for immunization        Relevant Orders    FLUZONE: influenza vaccine, quadrivalent, 0 5 mL    Prostate cancer screening        Relevant Orders    PSA, Total Screen            Subjective:      Patient ID: Aubree Moser is a 63 y o  male  Pt here for f/u HTN, HL, DM, PIERRE, GERD, Colon Polyps  Doing well  No cp/sob  No headaches  No abd pain  BP good  Exercises and follows low carb diet  No GERD recently  Saw Dr Shlomo Apley last mth  Wants flu shot  Had eye exam this year  Stress level good         The following portions of the patient's history were reviewed and updated as appropriate:   He has a past medical history of Diabetes mellitus ([facility] [address] ), GERD (gastroesophageal reflux disease), Hyperlipidemia, and Hypertension  ,  does not have any pertinent problems on file  ,   has a past surgical history that includes Colonoscopy (01/25/2019); ASD repair; Hernia repair; [place] tooth extraction; and PSA,Screening (Historical) (01/28/2019)  ,  family history includes Diabetes in his maternal grandfather and mother; Hypertension in his mother; Tuberculosis in his father  ,   reports that he has been smoking cigarettes  He has been smoking about 0 50 packs per day  He uses smokeless tobacco  He reports current alcohol use  He reports that he does not use drugs  ,  is allergic to dust mite extract; molds & smuts; pollen extract; and strawberry extract     Current Outpatient Medications   Medication Sig Dispense Refill    aspirin (ECOTRIN LOW STRENGTH) 81 mg EC tablet Take 81 mg by mouth daily      citalopram (CeleXA) 10 mg tablet Take 1 tablet (10 mg total) by mouth daily 90 tablet 2    fenofibrate (TRICOR) 145 mg tablet Take 145 mg by mouth daily      insulin glargine (Lantus SoloStar) 100 units/mL injection pen Inject 30 units twice a day  E11 65      irbesartan (AVAPRO) 300 mg tablet Take 300 mg by mouth daily      lansoprazole (PREVACID) 15 mg capsule Take 15 mg by mouth daily      Liraglutide (VICTOZA SC) Inject 1 8 mcg under the skin daily      metFORMIN (GLUCOPHAGE) 1000 MG tablet Take 1,000 mg by mouth 2 (two) times a day      rosuvastatin (CRESTOR) 20 MG tablet TAKE ONE TABLET BY MOUTH EVERY DAY 30 tablet 5    ascorbic acid (VITAMIN C) 500 MG tablet Take 1 tablet (500 mg total) by mouth daily (Patient not taking: Reported on 9/15/2020) 30 tablet 0    ferrous sulfate 325 (65 Fe) mg tablet Take 1 tablet (325 mg total) by mouth daily with breakfast (Patient not taking: Reported on 9/15/2020) 30 tablet 0    folic acid (FOLVITE) 1 mg tablet Take 1 tablet (1 mg total) by mouth daily (Patient not taking: Reported on 9/15/2020) 30 tablet 0     No current facility-administered medications for this visit  Review of Systems   Constitutional: Negative for fatigue and unexpected weight change  Eyes: Negative for visual disturbance  Respiratory: Negative for chest tightness and shortness of breath  Cardiovascular: Negative for chest pain and palpitations  Gastrointestinal: Negative for abdominal pain, constipation, diarrhea, nausea and vomiting  Endocrine: Negative for polydipsia, polyphagia and polyuria  Genitourinary: Negative for frequency  Musculoskeletal: Negative for arthralgias  Skin: Negative for rash and wound  Neurological: Negative for numbness  Objective:  Vitals:    09/15/20 1734   BP: 100/70   BP Location: Left arm   Patient Position: Sitting   Cuff Size: Adult   Pulse: 80   Resp: 16   Temp: 97 8 °F (36 6 °C)   SpO2: 98%   Weight: 98 4 kg (217 lb)   Height: 5' 11" (1 803 m)     Body mass index is 30 27 kg/m²  Physical Exam  Vitals signs and nursing note reviewed  Constitutional:       Appearance: He is well-developed  Neck:      Musculoskeletal: Normal range of motion and neck supple  Thyroid: No thyromegaly  Cardiovascular:      Rate and Rhythm: Normal rate and regular rhythm  Heart sounds: Normal heart sounds  No murmur  Pulmonary:      Effort: Pulmonary effort is normal  No respiratory distress  Breath sounds: Normal breath sounds  No wheezing  Genitourinary:     Prostate: Normal       Rectum: Normal  Guaiac result negative  Lymphadenopathy:      Cervical: No cervical adenopathy  Neurological:      Mental Status: He is alert and oriented to person, place, and time  Cranial Nerves: No cranial nerve deficit  Psychiatric:         Behavior: Behavior normal          Thought Content:  Thought content normal          Judgment: Judgment normal

## 2020-11-10 ENCOUNTER — OFFICE VISIT (OUTPATIENT)
Dept: FAMILY MEDICINE CLINIC | Facility: CLINIC | Age: 55
End: 2020-11-10
Payer: COMMERCIAL

## 2020-11-10 VITALS
BODY MASS INDEX: 30.38 KG/M2 | WEIGHT: 217 LBS | OXYGEN SATURATION: 96 % | HEIGHT: 71 IN | SYSTOLIC BLOOD PRESSURE: 110 MMHG | TEMPERATURE: 97.2 F | DIASTOLIC BLOOD PRESSURE: 72 MMHG | HEART RATE: 72 BPM

## 2020-11-10 DIAGNOSIS — M25.559 HIP PAIN: Primary | ICD-10-CM

## 2020-11-10 PROCEDURE — 3725F SCREEN DEPRESSION PERFORMED: CPT | Performed by: FAMILY MEDICINE

## 2020-11-10 PROCEDURE — 99213 OFFICE O/P EST LOW 20 MIN: CPT | Performed by: FAMILY MEDICINE

## 2020-11-16 ENCOUNTER — TELEPHONE (OUTPATIENT)
Dept: FAMILY MEDICINE CLINIC | Facility: CLINIC | Age: 55
End: 2020-11-16

## 2020-11-16 DIAGNOSIS — U07.1 COVID-19: ICD-10-CM

## 2020-11-16 DIAGNOSIS — U07.1 COVID-19: Primary | ICD-10-CM

## 2020-11-16 PROCEDURE — U0003 INFECTIOUS AGENT DETECTION BY NUCLEIC ACID (DNA OR RNA); SEVERE ACUTE RESPIRATORY SYNDROME CORONAVIRUS 2 (SARS-COV-2) (CORONAVIRUS DISEASE [COVID-19]), AMPLIFIED PROBE TECHNIQUE, MAKING USE OF HIGH THROUGHPUT TECHNOLOGIES AS DESCRIBED BY CMS-2020-01-R: HCPCS | Performed by: FAMILY MEDICINE

## 2020-11-17 LAB — SARS-COV-2 RNA SPEC QL NAA+PROBE: NOT DETECTED

## 2020-11-18 ENCOUNTER — HOSPITAL ENCOUNTER (OUTPATIENT)
Dept: RADIOLOGY | Facility: HOSPITAL | Age: 55
Discharge: HOME | End: 2020-11-18
Attending: FAMILY MEDICINE
Payer: COMMERCIAL

## 2020-11-18 DIAGNOSIS — M25.559 HIP PAIN: ICD-10-CM

## 2020-11-18 PROCEDURE — 73502 X-RAY EXAM HIP UNI 2-3 VIEWS: CPT

## 2020-11-23 ENCOUNTER — APPOINTMENT (OUTPATIENT)
Dept: RADIOLOGY | Facility: AMBULARY SURGERY CENTER | Age: 55
End: 2020-11-23
Attending: ORTHOPAEDIC SURGERY
Payer: COMMERCIAL

## 2020-11-23 ENCOUNTER — CONSULT (OUTPATIENT)
Dept: OBGYN CLINIC | Facility: CLINIC | Age: 55
End: 2020-11-23
Payer: COMMERCIAL

## 2020-11-23 VITALS
HEART RATE: 76 BPM | DIASTOLIC BLOOD PRESSURE: 72 MMHG | SYSTOLIC BLOOD PRESSURE: 123 MMHG | WEIGHT: 217 LBS | BODY MASS INDEX: 30.38 KG/M2 | HEIGHT: 71 IN

## 2020-11-23 DIAGNOSIS — M54.16 LUMBAR RADICULITIS: Primary | ICD-10-CM

## 2020-11-23 DIAGNOSIS — M25.559 HIP PAIN: ICD-10-CM

## 2020-11-23 DIAGNOSIS — M47.816 LUMBAR FACET ARTHROPATHY: ICD-10-CM

## 2020-11-23 DIAGNOSIS — M54.16 RADICULOPATHY, LUMBAR REGION: ICD-10-CM

## 2020-11-23 DIAGNOSIS — M51.36 DDD (DEGENERATIVE DISC DISEASE), LUMBAR: ICD-10-CM

## 2020-11-23 PROBLEM — M51.369 DDD (DEGENERATIVE DISC DISEASE), LUMBAR: Status: ACTIVE | Noted: 2020-11-23

## 2020-11-23 PROCEDURE — 3008F BODY MASS INDEX DOCD: CPT | Performed by: ORTHOPAEDIC SURGERY

## 2020-11-23 PROCEDURE — 72110 X-RAY EXAM L-2 SPINE 4/>VWS: CPT

## 2020-11-23 PROCEDURE — 3074F SYST BP LT 130 MM HG: CPT | Performed by: ORTHOPAEDIC SURGERY

## 2020-11-23 PROCEDURE — 3078F DIAST BP <80 MM HG: CPT | Performed by: ORTHOPAEDIC SURGERY

## 2020-11-23 PROCEDURE — 4004F PT TOBACCO SCREEN RCVD TLK: CPT | Performed by: ORTHOPAEDIC SURGERY

## 2020-11-23 PROCEDURE — 99203 OFFICE O/P NEW LOW 30 MIN: CPT | Performed by: ORTHOPAEDIC SURGERY

## 2020-11-23 RX ORDER — METHYLPREDNISOLONE 4 MG/1
TABLET ORAL
Qty: 1 EACH | Refills: 1 | Status: SHIPPED | OUTPATIENT
Start: 2020-11-23 | End: 2021-01-19

## 2020-12-10 ENCOUNTER — EVALUATION (OUTPATIENT)
Dept: PHYSICAL THERAPY | Facility: CLINIC | Age: 55
End: 2020-12-10
Payer: COMMERCIAL

## 2020-12-10 DIAGNOSIS — M47.816 LUMBAR FACET ARTHROPATHY: ICD-10-CM

## 2020-12-10 DIAGNOSIS — E78.5 DYSLIPIDEMIA: ICD-10-CM

## 2020-12-10 DIAGNOSIS — M51.36 DDD (DEGENERATIVE DISC DISEASE), LUMBAR: ICD-10-CM

## 2020-12-10 DIAGNOSIS — M54.16 LUMBAR RADICULITIS: Primary | ICD-10-CM

## 2020-12-10 PROCEDURE — 97112 NEUROMUSCULAR REEDUCATION: CPT | Performed by: PHYSICAL THERAPIST

## 2020-12-10 PROCEDURE — 97162 PT EVAL MOD COMPLEX 30 MIN: CPT | Performed by: PHYSICAL THERAPIST

## 2020-12-10 PROCEDURE — 97110 THERAPEUTIC EXERCISES: CPT | Performed by: PHYSICAL THERAPIST

## 2020-12-10 RX ORDER — ROSUVASTATIN CALCIUM 20 MG/1
20 TABLET, COATED ORAL DAILY
Qty: 90 TABLET | Refills: 2 | Status: SHIPPED | OUTPATIENT
Start: 2020-12-10 | End: 2021-09-07

## 2020-12-11 ENCOUNTER — CONSULT (OUTPATIENT)
Dept: PAIN MEDICINE | Facility: CLINIC | Age: 55
End: 2020-12-11
Payer: COMMERCIAL

## 2020-12-11 VITALS
DIASTOLIC BLOOD PRESSURE: 66 MMHG | HEIGHT: 71 IN | SYSTOLIC BLOOD PRESSURE: 112 MMHG | HEART RATE: 80 BPM | TEMPERATURE: 98 F | BODY MASS INDEX: 30.24 KG/M2 | WEIGHT: 216 LBS

## 2020-12-11 DIAGNOSIS — M54.16 LUMBAR RADICULITIS: Primary | ICD-10-CM

## 2020-12-11 DIAGNOSIS — M47.816 LUMBAR FACET ARTHROPATHY: ICD-10-CM

## 2020-12-11 DIAGNOSIS — M51.36 DDD (DEGENERATIVE DISC DISEASE), LUMBAR: ICD-10-CM

## 2020-12-11 PROCEDURE — 4004F PT TOBACCO SCREEN RCVD TLK: CPT | Performed by: ANESTHESIOLOGY

## 2020-12-11 PROCEDURE — 3078F DIAST BP <80 MM HG: CPT | Performed by: ANESTHESIOLOGY

## 2020-12-11 PROCEDURE — 3074F SYST BP LT 130 MM HG: CPT | Performed by: ANESTHESIOLOGY

## 2020-12-11 PROCEDURE — 99244 OFF/OP CNSLTJ NEW/EST MOD 40: CPT | Performed by: ANESTHESIOLOGY

## 2020-12-11 PROCEDURE — 3008F BODY MASS INDEX DOCD: CPT | Performed by: ANESTHESIOLOGY

## 2020-12-11 RX ORDER — GABAPENTIN 300 MG/1
300 CAPSULE ORAL 3 TIMES DAILY
Qty: 90 CAPSULE | Refills: 1 | Status: SHIPPED | OUTPATIENT
Start: 2020-12-11 | End: 2021-01-21 | Stop reason: SDUPTHER

## 2020-12-21 ENCOUNTER — OFFICE VISIT (OUTPATIENT)
Dept: PHYSICAL THERAPY | Facility: CLINIC | Age: 55
End: 2020-12-21
Payer: COMMERCIAL

## 2020-12-21 DIAGNOSIS — M51.36 DDD (DEGENERATIVE DISC DISEASE), LUMBAR: ICD-10-CM

## 2020-12-21 DIAGNOSIS — M54.16 LUMBAR RADICULITIS: Primary | ICD-10-CM

## 2020-12-21 DIAGNOSIS — M47.816 LUMBAR FACET ARTHROPATHY: ICD-10-CM

## 2020-12-21 PROCEDURE — 97112 NEUROMUSCULAR REEDUCATION: CPT | Performed by: PHYSICAL THERAPIST

## 2020-12-21 PROCEDURE — 97110 THERAPEUTIC EXERCISES: CPT | Performed by: PHYSICAL THERAPIST

## 2020-12-21 PROCEDURE — 97140 MANUAL THERAPY 1/> REGIONS: CPT | Performed by: PHYSICAL THERAPIST

## 2021-01-07 ENCOUNTER — EVALUATION (OUTPATIENT)
Dept: PHYSICAL THERAPY | Facility: CLINIC | Age: 56
End: 2021-01-07
Payer: COMMERCIAL

## 2021-01-07 DIAGNOSIS — M47.816 LUMBAR FACET ARTHROPATHY: ICD-10-CM

## 2021-01-07 DIAGNOSIS — M51.36 DDD (DEGENERATIVE DISC DISEASE), LUMBAR: ICD-10-CM

## 2021-01-07 DIAGNOSIS — M54.16 LUMBAR RADICULITIS: Primary | ICD-10-CM

## 2021-01-07 PROCEDURE — 97140 MANUAL THERAPY 1/> REGIONS: CPT | Performed by: PHYSICAL THERAPIST

## 2021-01-07 NOTE — PROGRESS NOTES
PT Evaluation     Today's date: 2021  Patient name: Brandy Garcia  : 1965  MRN: [id number]  Referring provider: Kirby Holcomb PA-C  Dx:   Encounter Diagnosis     ICD-10-CM    1  Lumbar radiculitis  M54 16    2  DDD (degenerative disc disease), lumbar  M51 36    3  Lumbar facet arthropathy  M47 816                   Assessment  Assessment details: Pt is progressing very well at this time  They have demonstrated improvement in range strength, flexibility, tolerance to activity as well as pain level intensity, centralizing factors and frequency of pain levels  They have achieved all STGs sought out for them as well as by them  They will benefit continued Skilled PT intervention in order to achieve all LTGs sought out for them as well as by them in order to perform all desired activities with minimal to nil symptom exacerbation  Thank you very much for this kind and motivated referral         Impairments: abnormal or restricted ROM, activity intolerance, impaired physical strength, lacks appropriate home exercise program, pain with function, poor posture  and poor body mechanics  Understanding of Dx/Px/POC: good   Prognosis: good    Goals  STG 4 Weeks:  Decrease pain at worst to 5 -met  Improve range to by 5 from IE  -met  Improve strength to TA draw 20" -met  Independent with HEP -met  LTG 8 Weeks:  Decrease pain at worst to 2  Improve range to improve all planes by 10 from IE  Improve strength to TA draw 30"   Able to perform all desired activities with minimal to nil symptom exacerbation      Plan  Plan details: 1-2xs every other week due to copay  Will follow up in 2-3 weeks unless exacerbation occurs     Patient would benefit from: skilled physical therapy  Planned modality interventions: cryotherapy, TENS and thermotherapy: hydrocollator packs  Planned therapy interventions: joint mobilization, manual therapy, abdominal trunk stabilization, neuromuscular re-education, patient education, stretching, strengthening, therapeutic activities, therapeutic exercise, transfer training, home exercise program, graded motor, graded exercise, graded activity, functional ROM exercises, flexibility and balance  Frequency: 1x week  Duration in weeks: 10  Treatment plan discussed with: patient        Subjective Evaluation    History of Present Illness  Date of onset: 12/10/2020  Mechanism of injury: Pt presents today stating that he has not had any leg pain or symptoms in several weeks  States that pain is rarely high in the last week  Pt reports maybe 4/10 at worst, states his endurance at work and pain at work is far less frequent, and usually only having pain at the end of the day  He reports improved sleeping, is not being interrupted and not waking up with morning pain anymore  Pt reports being content with his progression thus far  He is still taking medication as recommended and is being compliant with HEP  Pt follows up with Dr Sana Kovacs on 2021      Pain  Current pain ratin  At best pain ratin  At worst pain ratin  Quality: needle-like, sharp and dull ache  Relieving factors: change in position, rest, relaxation and medications  Aggravating factors: standing, walking, overhead activity and lifting  Progression: worsening      Diagnostic Tests  X-ray: normal (degenerative properties evident  )  Treatments  Previous treatment: physical therapy and medication  Patient Goals  Patient goals for therapy: increased strength, return to sport/leisure activities, increased motion and decreased pain          Objective     Active Range of Motion     Lumbar   Flexion: 65 degrees   Extension: 15 degrees   Left lateral flexion: 15 degrees    with pain  Right lateral flexion: 15 degrees  with pain  Left rotation: 75 degrees   Right rotation: 75 degrees     Additional Active Range of Motion Details  Forward head, rounded shoulders, decreased Lordosis  DTR L Achilles and Patella 2 + R Achilles and Patella 2+  B/L Sensation intact to L3,4,5,S1,S2  Postural correction low back pain  Abolished  Repeated motion   Flex  Ext low back abolished, EIS trial for assessment  Mild pain  Better  Better  Abolished  (remains)  SB R  SB L  LE Strength  Hip   L Flex 5 Ext 5 Abd 5 Add 5  R Flex 5 Ext 5 Abd 5 Add 5  LE screen strong and painless   Joint Mobility: G1L1-3, G2 L3-S1  Palpation: mild irritation of L5-S1  STs  TA draw 20"   TA draw with transfer abolishes pain with transfer  Precautions: HTN DM2      Manuals 12/10 12/21 1/7          Prone PA trial G1-3 L1-S1  10 min 10 min             assessment x 15                                    Neuro Re-Ed             Postural Ed 10 Min            TA draw review with Transfer  5 min           Pball Press Down stand  6" x 20 nv          Pball Pressdown Hip Abd/Ext  2 x 10 nv          Tband Row + ext  GTB 2 x 10  nv          Tband Multi Rot  RTB 2 x 10 nv                       Ther Ex             EIS 4 x 10 2 x 10 nv          EIS progression?              Gastroc Wedge ST  30" x 4 nv          Seated Ext with Bolster             Scap Add   10" x 10 nv                                                 Ther Activity             Sit to stand             Mini squats             Gait Training                                       Modalities             Prone HP prn

## 2021-01-19 ENCOUNTER — OFFICE VISIT (OUTPATIENT)
Dept: FAMILY MEDICINE CLINIC | Facility: CLINIC | Age: 56
End: 2021-01-19
Payer: COMMERCIAL

## 2021-01-19 VITALS
HEIGHT: 71 IN | HEART RATE: 78 BPM | WEIGHT: 222 LBS | RESPIRATION RATE: 16 BRPM | BODY MASS INDEX: 31.08 KG/M2 | TEMPERATURE: 97.4 F | SYSTOLIC BLOOD PRESSURE: 120 MMHG | OXYGEN SATURATION: 98 % | DIASTOLIC BLOOD PRESSURE: 80 MMHG

## 2021-01-19 DIAGNOSIS — Z23 ENCOUNTER FOR IMMUNIZATION: ICD-10-CM

## 2021-01-19 DIAGNOSIS — I10 ESSENTIAL HYPERTENSION: Primary | ICD-10-CM

## 2021-01-19 DIAGNOSIS — E78.2 MIXED HYPERLIPIDEMIA: ICD-10-CM

## 2021-01-19 DIAGNOSIS — Z79.4 TYPE 2 DIABETES MELLITUS WITHOUT COMPLICATION, WITH LONG-TERM CURRENT USE OF INSULIN (HCC): ICD-10-CM

## 2021-01-19 DIAGNOSIS — F41.1 GAD (GENERALIZED ANXIETY DISORDER): ICD-10-CM

## 2021-01-19 DIAGNOSIS — E11.9 TYPE 2 DIABETES MELLITUS WITHOUT COMPLICATION, WITH LONG-TERM CURRENT USE OF INSULIN (HCC): ICD-10-CM

## 2021-01-19 DIAGNOSIS — K63.5 POLYP OF COLON, UNSPECIFIED PART OF COLON, UNSPECIFIED TYPE: ICD-10-CM

## 2021-01-19 DIAGNOSIS — K21.9 GASTROESOPHAGEAL REFLUX DISEASE, UNSPECIFIED WHETHER ESOPHAGITIS PRESENT: ICD-10-CM

## 2021-01-19 PROCEDURE — 99214 OFFICE O/P EST MOD 30 MIN: CPT | Performed by: FAMILY MEDICINE

## 2021-01-19 PROCEDURE — 90471 IMMUNIZATION ADMIN: CPT | Performed by: FAMILY MEDICINE

## 2021-01-19 PROCEDURE — 90715 TDAP VACCINE 7 YRS/> IM: CPT | Performed by: FAMILY MEDICINE

## 2021-01-19 RX ORDER — LIRAGLUTIDE 6 MG/ML
1.8 INJECTION SUBCUTANEOUS DAILY
COMMUNITY
Start: 2020-12-22 | End: 2021-01-19

## 2021-01-19 RX ORDER — IRBESARTAN 150 MG/1
300 TABLET ORAL DAILY
COMMUNITY
Start: 2020-12-22 | End: 2021-01-19

## 2021-01-19 RX ORDER — LIRAGLUTIDE 6 MG/ML
1.8 INJECTION SUBCUTANEOUS DAILY
COMMUNITY
Start: 2020-12-21

## 2021-01-19 NOTE — PROGRESS NOTES
BMI Counseling: Body mass index is 30 96 kg/m²  The BMI is above normal  Nutrition recommendations include decreasing portion sizes, encouraging healthy choices of fruits and vegetables, consuming healthier snacks and moderation in carbohydrate intake  Exercise recommendations include exercising 3-5 times per week  No pharmacotherapy was ordered  Assessment/Plan:         Problem List Items Addressed This Visit        Digestive    GERD (gastroesophageal reflux disease)     No recent GERD  Cont prevacid 15 mg qd and GERD diet  Colon polyps     Last colo was Jan 2019 and had 4 polyps  Recheck in Jan 2022  Endocrine    Type 2 diabetes mellitus without complication, with long-term current use of insulin (HCC)     A1C 7 3 in Aug 2020  Cont meds and low carb diet  Had Flu shot  Foot exam done  Pt to sched eye exam  Had flu shot  Lab Results   Component Value Date    HGBA1C 7 3 (H) 08/01/2020            Relevant Medications    liraglutide (Victoza) injection       Cardiovascular and Mediastinum    Essential hypertension - Primary     BP good  Cont irbesartan 300 mg qd  Other    Mixed hyperlipidemia     LDL 58, HDL 29, and Tgs 93 in Aug 2020  Cont current meds  PIERRE (generalized anxiety disorder)     Stress level ok  Cont citalopram 10 mg qd  Other Visit Diagnoses     Encounter for immunization        Relevant Orders    TDAP VACCINE GREATER THAN OR EQUAL TO 6YO IM            Subjective:      Patient ID: Scott Barbosa is a 63 y o  male  Pt here for f/u HTN, HL, DM, PIERRE, GERD, Colon Polyps  Doing well  No cp/sob  No headaches  No abd pain  Walks at times  Sugars good at home  Checks them daily  Pt to see Dr Linda Ngo on 2/11/21 for f/u  For labs the week before  Pt getting PT for back and helping  BP has been good  Stress level ok  No recent GERD  Needs to sched eye exam  Had flu shot in Sept 2020        The following portions of the patient's history were reviewed and updated as appropriate:   Past Medical History:  He has a past medical history of Diabetes mellitus ([address] ), GERD (gastroesophageal reflux disease), Hyperlipidemia, and Hypertension  ,  _______________________________________________________________________  Medical Problems:  does not have any pertinent problems on file ,  _______________________________________________________________________  Past Surgical History:   has a past surgical history that includes Colonoscopy (01/25/2019); ASD repair; Hernia repair; [place] tooth extraction; and PSA,Screening (Historical) (01/28/2019)  ,  _______________________________________________________________________  Family History:  family history includes Diabetes in his maternal grandfather and mother; Hypertension in his mother; Tuberculosis in his father ,  _______________________________________________________________________  Social History:   reports that he has been smoking cigarettes  He started smoking about 43 years ago  He has been smoking about 0 50 packs per day  He uses smokeless tobacco  He reports current alcohol use  He reports that he does not use drugs  ,  _______________________________________________________________________  Allergies:  is allergic to dust mite extract; molds & smuts; pollen extract; and strawberry extract     _______________________________________________________________________  Current Outpatient Medications   Medication Sig Dispense Refill    aspirin (ECOTRIN LOW STRENGTH) 81 mg EC tablet Take 81 mg by mouth daily      citalopram (CeleXA) 10 mg tablet Take 1 tablet (10 mg total) by mouth daily 90 tablet 2    gabapentin (NEURONTIN) 300 mg capsule Take 1 capsule (300 mg total) by mouth 3 (three) times a day 90 capsule 1    insulin glargine (Lantus SoloStar) 100 units/mL injection pen Inject 30 units twice a day  E11 65      irbesartan (AVAPRO) 300 mg tablet Take 300 mg by mouth daily      lansoprazole (PREVACID) 15 mg capsule Take 15 mg by mouth daily      liraglutide (Victoza) injection Inject 1 8 mg under the skin daily      metFORMIN (GLUCOPHAGE) 1000 MG tablet Take 1,000 mg by mouth 2 (two) times a day      rosuvastatin (CRESTOR) 20 MG tablet Take 1 tablet (20 mg total) by mouth daily 90 tablet 2    fenofibrate (TRICOR) 145 mg tablet Take 145 mg by mouth daily       No current facility-administered medications for this visit       _______________________________________________________________________  Review of Systems   Constitutional: Negative for fatigue and unexpected weight change  Eyes: Negative for visual disturbance  Respiratory: Negative for chest tightness and shortness of breath  Cardiovascular: Negative for chest pain and palpitations  Gastrointestinal: Negative for abdominal pain, constipation, diarrhea, nausea and vomiting  Endocrine: Negative for polydipsia, polyphagia and polyuria  Genitourinary: Negative for frequency  Musculoskeletal: Positive for back pain  Negative for arthralgias  Skin: Negative for rash and wound  Neurological: Negative for numbness  Objective:  Vitals:    01/19/21 1729   BP: 120/80   BP Location: Left arm   Patient Position: Sitting   Cuff Size: Adult   Pulse: 78   Resp: 16   Temp: (!) 97 4 °F (36 3 °C)   SpO2: 98%   Weight: 101 kg (222 lb)   Height: 5' 11" (1 803 m)     Body mass index is 30 96 kg/m²  Physical Exam  Vitals signs and nursing note reviewed  Constitutional:       Appearance: Normal appearance  He is well-developed and normal weight  Neck:      Musculoskeletal: Normal range of motion and neck supple  Thyroid: No thyromegaly  Cardiovascular:      Rate and Rhythm: Normal rate and regular rhythm  Pulses: no weak pulses     Heart sounds: Normal heart sounds  No murmur  Pulmonary:      Effort: Pulmonary effort is normal  No respiratory distress  Breath sounds: Normal breath sounds  No wheezing     Musculoskeletal:      Right lower leg: No edema  Left lower leg: No edema  Feet:      Right foot:      Skin integrity: No ulcer, skin breakdown, erythema, warmth, callus or dry skin  Left foot:      Skin integrity: No ulcer, skin breakdown, erythema, warmth, callus or dry skin  Lymphadenopathy:      Cervical: No cervical adenopathy  Neurological:      Mental Status: He is alert and oriented to person, place, and time  Cranial Nerves: No cranial nerve deficit  Psychiatric:         Mood and Affect: Mood normal          Behavior: Behavior normal          Thought Content: Thought content normal          Judgment: Judgment normal        Patient's shoes and socks removed  Right Foot/Ankle   Right Foot Inspection  Skin Exam: skin normal and skin intact no dry skin, no warmth, no callus, no erythema, no maceration, no abnormal color, no pre-ulcer, no ulcer and no callus                            Sensory     Proprioception: intact     Vascular  Capillary refills: < 3 seconds      Left Foot/Ankle  Left Foot Inspection  Skin Exam: skin normal and skin intactno dry skin, no warmth, no erythema, no maceration, normal color, no pre-ulcer, no ulcer and no callus                                         Sensory     Proprioception: intact    Vascular  Capillary refills: < 3 seconds    Assign Risk Category:  No deformity present; No loss of protective sensation;  No weak pulses       Risk: 0

## 2021-01-19 NOTE — ASSESSMENT & PLAN NOTE
A1C 7 3 in Aug 2020  Cont meds and low carb diet  Had Flu shot  Foot exam done  Pt to sched eye exam  Had flu shot      Lab Results   Component Value Date    HGBA1C 7 3 (H) 08/01/2020

## 2021-01-21 ENCOUNTER — OFFICE VISIT (OUTPATIENT)
Dept: PAIN MEDICINE | Facility: CLINIC | Age: 56
End: 2021-01-21
Payer: COMMERCIAL

## 2021-01-21 VITALS
HEIGHT: 71 IN | WEIGHT: 222 LBS | SYSTOLIC BLOOD PRESSURE: 135 MMHG | BODY MASS INDEX: 31.08 KG/M2 | DIASTOLIC BLOOD PRESSURE: 79 MMHG | TEMPERATURE: 98.2 F | HEART RATE: 91 BPM

## 2021-01-21 DIAGNOSIS — M47.816 LUMBAR FACET ARTHROPATHY: ICD-10-CM

## 2021-01-21 DIAGNOSIS — M54.16 LUMBAR RADICULITIS: ICD-10-CM

## 2021-01-21 DIAGNOSIS — M51.36 DDD (DEGENERATIVE DISC DISEASE), LUMBAR: ICD-10-CM

## 2021-01-21 DIAGNOSIS — M54.16 RADICULOPATHY, LUMBAR REGION: Primary | ICD-10-CM

## 2021-01-21 PROCEDURE — 3008F BODY MASS INDEX DOCD: CPT | Performed by: ANESTHESIOLOGY

## 2021-01-21 PROCEDURE — 99214 OFFICE O/P EST MOD 30 MIN: CPT | Performed by: NURSE PRACTITIONER

## 2021-01-21 RX ORDER — GABAPENTIN 300 MG/1
300 CAPSULE ORAL 3 TIMES DAILY
Qty: 90 CAPSULE | Refills: 1 | Status: SHIPPED | OUTPATIENT
Start: 2021-01-21 | End: 2021-03-02 | Stop reason: SDUPTHER

## 2021-01-21 RX ORDER — MELOXICAM 15 MG/1
15 TABLET ORAL DAILY
Qty: 30 TABLET | Refills: 1 | Status: SHIPPED | OUTPATIENT
Start: 2021-01-21 | End: 2021-03-02 | Stop reason: SDUPTHER

## 2021-01-21 NOTE — PROGRESS NOTES
Assessment:  1  Radiculopathy, lumbar region    2  Lumbar radiculitis    3  DDD (degenerative disc disease), lumbar    4  Lumbar facet arthropathy        Plan:  1  Patient will continue with physical therapy  If no relief after 6 weeks, will order an MRI of the lumbar spine without contrast  2  I will trial the patient on meloxicam 15 mg daily  He was advised to avoid other NSAIDs while on this medication and to take the medication with food  3  The patient may continue gabapentin 300 mg 3 times a day as prescribed  This medication was refilled today  4  Patient may benefit from lumbar medial branch blocks in the future  5  The patient will follow-up in 4 weeks for medication prescription refill and reevaluation  The patient was advised to contact the office should their symptoms worsen in the interim  The patient was agreeable and verbalized an understanding  [email or web address] software was used to dictate this note  It may contain errors with dictating incorrect words or incorrect spelling  Please contact the provider directly with any questions  History of Present Illness: The patient is a 63 y o  male with a history of diabetes last seen on 12/11/20 who presents for a follow up office visit in regards to chronic lumbosacral back pain with intermittent radiation into the posterolateral aspect of the bilateral lower extremities terminating at the calf secondary to lumbar spondylosis, lumbar degenerative disc disease  The patient denies bowel or bladder incontinence or saddle anesthesia  The patient has complete 3 sessions of physical therapy thus far  He has not noticed much improvement of his symptoms  He does feel that the gabapentin 300 mg 3 times a day has somewhat improved his leg pain  He does continue with low back pain  Has not tried any NSAIDs other than over-the-counter Aleve  He does not find Tylenol helpful  X-ray of the lumbar spine reveals degenerative disc disease and spondylosis    X-ray of the left hip was unremarkable  The patient rates his pain an 8/10 on the numeric pain rating scale  States the pain is intermittent in nature bothersome the evening  He characterizes the pain as sharp  I have personally reviewed and/or updated the patient's past medical history, past surgical history, family history, social history, current medications, allergies, and vital signs today  Review of Systems:    Review of Systems   Respiratory: Negative for shortness of breath  Cardiovascular: Negative for chest pain  Gastrointestinal: Negative for constipation, diarrhea, nausea and vomiting  Musculoskeletal: Negative for arthralgias, gait problem, joint swelling and myalgias  Skin: Negative for rash  Neurological: Negative for dizziness, seizures and weakness  All other systems reviewed and are negative          Past Medical History:   Diagnosis Date    Diabetes mellitus ([facility] [address] )     GERD (gastroesophageal reflux disease)     Hyperlipidemia     Hypertension        Past Surgical History:   Procedure Laterality Date    ASD REPAIR      COLONOSCOPY  01/25/2019    Due 1/2024    HERNIA REPAIR      rpr umbil johny reduc >5 yr    PSA,SCREENING (HISTORICAL)  01/28/2019    WISDOM TOOTH EXTRACTION         Family History   Problem Relation Age of Onset    Diabetes Mother     Hypertension Mother    Lindsey Crystal Tuberculosis Father     Diabetes Maternal Grandfather        Social History     Occupational History    Not on file   Tobacco Use    Smoking status: Current Every Day Smoker     Packs/day: 0 50     Types: Cigarettes     Start date: 1978    Smokeless tobacco: Current User   Substance and Sexual Activity    Alcohol use: Yes     Frequency: 2-4 times a month     Drinks per session: 3 or 4    Drug use: Never    Sexual activity: Not on file         Current Outpatient Medications:     aspirin (ECOTRIN LOW STRENGTH) 81 mg EC tablet, Take 81 mg by mouth daily, Disp: , Rfl:     citalopram (CeleXA) 10 mg tablet, Take 1 tablet (10 mg total) by mouth daily, Disp: 90 tablet, Rfl: 2    fenofibrate (TRICOR) 145 mg tablet, Take 145 mg by mouth daily, Disp: , Rfl:     gabapentin (NEURONTIN) 300 mg capsule, Take 1 capsule (300 mg total) by mouth 3 (three) times a day, Disp: 90 capsule, Rfl: 1    insulin glargine (Lantus SoloStar) 100 units/mL injection pen, Inject 30 units twice a day  E11 65, Disp: , Rfl:     irbesartan (AVAPRO) 300 mg tablet, Take 300 mg by mouth daily, Disp: , Rfl:     lansoprazole (PREVACID) 15 mg capsule, Take 15 mg by mouth daily, Disp: , Rfl:     liraglutide (Victoza) injection, Inject 1 8 mg under the skin daily, Disp: , Rfl:     metFORMIN (GLUCOPHAGE) 1000 MG tablet, Take 1,000 mg by mouth 2 (two) times a day, Disp: , Rfl:     rosuvastatin (CRESTOR) 20 MG tablet, Take 1 tablet (20 mg total) by mouth daily, Disp: 90 tablet, Rfl: 2    meloxicam (MOBIC) 15 mg tablet, Take 1 tablet (15 mg total) by mouth daily, Disp: 30 tablet, Rfl: 1    Allergies   Allergen Reactions    Dust Mite Extract     Molds & Smuts     Pollen Extract      Maple trees    Strawberry Extract Other (See Comments)       Physical Exam:    /79   Pulse 91   Temp 98 2 °F (36 8 °C)   Ht 5' 11" (1 803 m)   Wt 101 kg (222 lb)   BMI 30 96 kg/m²     Constitutional:normal, well developed, well nourished, alert, in no distress and non-toxic and no overt pain behavior  Eyes:anicteric  HEENT:grossly intact  Neck:supple, symmetric, trachea midline and no masses   Pulmonary:even and unlabored  Cardiovascular:No edema or pitting edema present  Skin:Normal without rashes or lesions and well hydrated  Psychiatric:Mood and affect appropriate  Neurologic:Cranial Nerves II-XII grossly intact  Musculoskeletal:antalgic gait  Bilateral lumbar paraspinal musculature tender to palpation  Bilateral lower extremity strength 5/5 in all muscle groups  Sensation intact to light touch bilaterally in L3 through S1 dermatomes  Negative straight leg raise bilaterally  Positive lumbar facet loading maneuvers      Imaging  No orders to display   LUMBAR SPINE     INDICATION:   M54 16: Radiculopathy, lumbar region      COMPARISON:  None     VIEWS:  XR SPINE LUMBAR MINIMUM 4 VIEWS NON INJURY        FINDINGS:     There are 5 non rib bearing lumbar vertebral bodies       There is no evidence of acute fracture or destructive osseous lesion      Alignment is unremarkable  No abnormal motion on flexion/extension views      Mild multilevel degenerative disc disease and moderate osteoarthritis of the lower lumbar facet joints      The pedicles appear intact      Soft tissues are unremarkable      IMPRESSION:     No acute osseous abnormality        Degenerative changes as described          No orders of the defined types were placed in this encounter

## 2021-01-28 ENCOUNTER — EVALUATION (OUTPATIENT)
Dept: PHYSICAL THERAPY | Facility: CLINIC | Age: 56
End: 2021-01-28
Payer: COMMERCIAL

## 2021-01-28 DIAGNOSIS — M54.16 LUMBAR RADICULITIS: Primary | ICD-10-CM

## 2021-01-28 DIAGNOSIS — M51.36 DDD (DEGENERATIVE DISC DISEASE), LUMBAR: ICD-10-CM

## 2021-01-28 PROCEDURE — 97110 THERAPEUTIC EXERCISES: CPT | Performed by: PHYSICAL THERAPIST

## 2021-01-28 PROCEDURE — 97140 MANUAL THERAPY 1/> REGIONS: CPT | Performed by: PHYSICAL THERAPIST

## 2021-01-28 NOTE — PROGRESS NOTES
PT Evaluation     Today's date: 2021  Patient name: Shauna Keating  : 1965  MRN: [id number]  Referring provider: Irene Polo PA-C  Dx:   Encounter Diagnosis     ICD-10-CM    1  Lumbar radiculitis  M54 16    2  DDD (degenerative disc disease), lumbar  M51 36                   Assessment  Assessment details: Pt is continuing to progress quite well at this time  They have demonstrated improvement in range, sustained strangth, improved flexibility, tolerance to activity as well as pain level intensity  His R LE symptoms are new in onset as of this week, but have historical recall for pt  They have sustained all STGs sought out for them as well as by them and began to check off some LTG's  Coordination of continued care at 1x a week for 4 more weeks may be most appropriate as pt is interested in having imagery performed  Will continue to focus on primary gains of neural tension relief, further sustained centralization of R LE and keep that of L LE  They will benefit continued Skilled PT intervention in order to achieve all LTGs sought out for them as well as by them in order to perform all desired activities with minimal to nil symptom exacerbation    Thank you very much for this kind and motivated referral         Impairments: abnormal or restricted ROM, activity intolerance, impaired physical strength, lacks appropriate home exercise program, pain with function, poor posture  and poor body mechanics  Understanding of Dx/Px/POC: good   Prognosis: good    Goals  STG 4 Weeks:  Decrease pain at worst to 5 -met  Improve range to by 5 from IE  -met  Improve strength to TA draw 20" -met  Independent with HEP -met  LTG 8 Weeks:  Decrease pain at worst to 2  Improve range to improve all planes by 10 from IE -met  Improve strength to TA draw 30"  -partial  Able to perform all desired activities with minimal to nil symptom exacerbation      Plan  Patient would benefit from: skilled physical therapy  Planned modality interventions: cryotherapy, TENS and thermotherapy: hydrocollator packs  Planned therapy interventions: joint mobilization, manual therapy, abdominal trunk stabilization, neuromuscular re-education, patient education, stretching, strengthening, therapeutic activities, therapeutic exercise, transfer training, home exercise program, graded motor, graded exercise, graded activity, functional ROM exercises, flexibility and balance  Frequency: 1x week  Duration in weeks: 10  Treatment plan discussed with: patient        Subjective Evaluation    History of Present Illness  Date of onset: 12/10/2020  Mechanism of injury: Pt presents today stating that prior to this week he had been doing really well  Pt reports up to this week he did develop some R leg pain that is dull in nature  He reports he had been very busy at work and he was on his feet and this was a new onset  Pt reports that he met with pain management this past week and they gave him medication which has assisted  Pt reports that the his L groin pain and leg pain has not happened  Pt reports that he will follow up with pain management in 4 weeks which indicated in order for imagery to be performed which is next step, pt is to perform 8 visits within PT which he is currently 4 of 8  Pt reports that he would like to continue to address residual back pain and R leg pain if possible  Pt otherwise reports pain at worst is still about 4/10 of this time  Pt reports compliance with HEP      Quality of life: good    Pain  Current pain ratin  At best pain ratin  At worst pain ratin  Quality: needle-like, sharp and dull ache  Relieving factors: change in position, rest, relaxation and medications  Aggravating factors: standing, walking, overhead activity and lifting  Progression: worsening      Diagnostic Tests  X-ray: normal (degenerative properties evident  )  Treatments  Previous treatment: physical therapy and medication  Patient Goals  Patient goals for therapy: increased strength, return to sport/leisure activities, increased motion and decreased pain          Objective     Active Range of Motion     Lumbar   Flexion: 65 degrees   Extension: 20 degrees   Left lateral flexion: 20 degrees    with pain  Right lateral flexion: 20 degrees  with pain  Left rotation: 100 degrees   Right rotation: 100 degrees     Additional Active Range of Motion Details  Forward head, rounded shoulders, decreased Lordosis  DTR L Achilles and Patella 2 + R Achilles and Patella 2+  B/L Sensation intact to L3,4,5,S1,S2  Postural correction low back pain  Abolished  Repeated motion   Flex  Ext low back abolished, EIS trial for assessment  Mild pain  Better  Better  Abolished  (remains)  SB R  SB L  LE Strength  Hip   L Flex 5 Ext 5 Abd 5 Add 5  R Flex 5 Ext 5 Abd 5 Add 5  LE screen strong and painless   Joint Mobility: G1L1-3, G2 L3-S1  Palpation: mild irritation of L5-S1  STs  TA draw 25"   TA draw with transfer abolishes pain with transfer  Slump + R  Precautions: HTN DM2      Manuals 12/10 12/21 1/7 1/28         Prone PA trial G1-3 L1-S1  10 min 10 min 10            assessment x 15 15                                   Neuro Re-Ed             Postural Ed 10 Min            TA draw review with Transfer  5 min           Pball Press Down stand  6" x 20 nv          Pball Pressdown Hip Abd/Ext  2 x 10 nv          Tband Row + ext  GTB 2 x 10  nv          Tband Multi Rot  RTB 2 x 10 nv                       Ther Ex             EIS 4 x 10 2 x 10 nv 2 x 10         EIS progression?              Gastroc Wedge ST  30" x 4 nv 30" x4         Seated Ext with Bolster             Scap Add   10" x 10 nv 10" x 10                                                Ther Activity             Sit to stand             Mini squats             Gait Training                                       Modalities             Prone HP prn

## 2021-02-04 ENCOUNTER — OFFICE VISIT (OUTPATIENT)
Dept: PHYSICAL THERAPY | Facility: CLINIC | Age: 56
End: 2021-02-04
Payer: COMMERCIAL

## 2021-02-04 DIAGNOSIS — M51.36 DDD (DEGENERATIVE DISC DISEASE), LUMBAR: ICD-10-CM

## 2021-02-04 DIAGNOSIS — M47.816 LUMBAR FACET ARTHROPATHY: ICD-10-CM

## 2021-02-04 DIAGNOSIS — M54.16 LUMBAR RADICULITIS: Primary | ICD-10-CM

## 2021-02-04 PROCEDURE — 97140 MANUAL THERAPY 1/> REGIONS: CPT | Performed by: PHYSICAL THERAPIST

## 2021-02-04 PROCEDURE — 97110 THERAPEUTIC EXERCISES: CPT | Performed by: PHYSICAL THERAPIST

## 2021-02-04 NOTE — PROGRESS NOTES
Daily Note     Today's date: 2021  Patient name: Lesa Garrido  : 1965  MRN: [id number]  Referring provider: Cassandra Heimlich, PA-C  Dx:   Encounter Diagnosis     ICD-10-CM    1  Lumbar radiculitis  M54 16    2  DDD (degenerative disc disease), lumbar  M51 36    3  Lumbar facet arthropathy  M47 816                   Subjective: Pt presents today stating that he is very stiff and sore from a long day of work  Reports his R leg has been bugging him quite a bit to the outside of the ankle  Objective: See treatment diary below      Assessment: Pt may be demonstrating lateral component thus rotational mobilization and exercises added which did abolish symptoms  Consider wall R SGIS n v  as able     Precautions: HTN DM2      Manuals 12/10 12/21 1/7 1/28 2/4        Prone PA trial G1-3 L1-S1  10 min 10 min 10 5           assessment x 15 15         LTR to R        5                     Neuro Re-Ed             Postural Ed 10 Min            TA draw review with Transfer  5 min           Pball Press Down stand  6" x 20 nv  6" x 20        Pball Pressdown Hip Abd/Ext  2 x 10 nv  2 x 10        Tband Row + ext  GTB 2 x 10  nv  nv        Tband Multi Rot  RTB 2 x 10 nv  nv                     Ther Ex             EIS 4 x 10 2 x 10 nv 2 x 10 2 x 10        EIS progression?              Gastroc Wedge ST  30" x 4 nv 30" x4 20" x 4        Seated Ext with Bolster     3" x 20        Scap Add   10" x 10 nv 10" x 10 10" x 10        LTR to R       2 x 10        R SGIS (L UE on Wall)     nv                     Ther Activity             Sit to stand             Mini squats             Gait Training                                       Modalities             Prone HP prn

## 2021-02-08 ENCOUNTER — OFFICE VISIT (OUTPATIENT)
Dept: PHYSICAL THERAPY | Facility: CLINIC | Age: 56
End: 2021-02-08
Payer: COMMERCIAL

## 2021-02-08 DIAGNOSIS — M51.36 DDD (DEGENERATIVE DISC DISEASE), LUMBAR: ICD-10-CM

## 2021-02-08 DIAGNOSIS — M54.16 LUMBAR RADICULITIS: Primary | ICD-10-CM

## 2021-02-08 DIAGNOSIS — M47.816 LUMBAR FACET ARTHROPATHY: ICD-10-CM

## 2021-02-08 PROCEDURE — 97110 THERAPEUTIC EXERCISES: CPT

## 2021-02-08 PROCEDURE — 97140 MANUAL THERAPY 1/> REGIONS: CPT | Performed by: PHYSICAL THERAPIST

## 2021-02-08 NOTE — PROGRESS NOTES
Daily Note     Today's date: 2021  Patient name: Hardik Youssef  : 1965  MRN: [id number]  Referring provider: Sonja Brown PA-C  Dx:   Encounter Diagnosis     ICD-10-CM    1  Lumbar radiculitis  M54 16    2  DDD (degenerative disc disease), lumbar  M51 36    3  Lumbar facet arthropathy  M47 816                   Subjective: Patient states that he experienced moderate pain with shoveling but today pain has returned to base line     Objective: See treatment diary below      Assessment: Tolerated treatment well  Patient exhibited good technique with therapeutic exercises      Plan: Continue per plan of care  Precautions: HTN DM2      Manuals 12/10 12/21 1/7 1/28 2/4 2/8       Prone PA trial G1-3 L1-S1  10 min 10 min 10 5 5- TS          assessment x 15 15         LTR to R        5 5  - TS                    Neuro Re-Ed             Postural Ed 10 Min            TA draw review with Transfer  5 min           Pball Press Down stand  6" x 20 nv  6" x 20 6" x 20        Pball Pressdown Hip Abd/Ext  2 x 10 nv  2 x 10        Tband Row + ext  GTB 2 x 10  nv  nv BTB 2 x 10        Tband Multi Rot  RTB 2 x 10 nv  nv                     Ther Ex             EIS 4 x 10 2 x 10 nv 2 x 10 2 x 10 2 x 10        EIS progression?              Gastroc Wedge ST  30" x 4 nv 30" x4 20" x 4 20"  x4       Seated Ext with Bolster     3" x 20 3" x 20        Scap Add   10" x 10 nv 10" x 10 10" x 10 10" x 10        LTR to R       2 x 10 2 x 10        R SGIS (L UE on Wall)     nv 2 x 10                     Ther Activity             Sit to stand             Mini squats             Gait Training                                       Modalities             Prone HP prn

## 2021-02-11 ENCOUNTER — APPOINTMENT (OUTPATIENT)
Dept: PHYSICAL THERAPY | Facility: CLINIC | Age: 56
End: 2021-02-11
Payer: COMMERCIAL

## 2021-02-16 ENCOUNTER — TELEPHONE (OUTPATIENT)
Dept: ADMINISTRATIVE | Facility: OTHER | Age: 56
End: 2021-02-16

## 2021-02-16 NOTE — LETTER
Procedure Request Form: Colonoscopy      Date Requested: 21  Patient: Kavita Arbyrd  Patient : 1965   Referring Provider: Baylee Cardenas, MD        Date of Procedure ____19 report__________________________       The above patient has informed us that they have completed their   most recent Colonoscopy at your facility  Please complete   this form and attach all corresponding procedure reports/results  Comments __________________________________________________________  ____________________________________________________________________  ____________________________________________________________________  ____________________________________________________________________    Facility Completing Procedure _________________________________________    Form Completed By (print name) _______________________________________      Signature __________________________________________________________      These reports are needed for  compliance    Please fax this completed form and a copy of the procedure report to our office located at [place] [address] as soon as possible to [phone] rc Allen Seen: Phone [phone]    We thank you for your assistance in treating our mutual patient

## 2021-02-16 NOTE — TELEPHONE ENCOUNTER
----- Message from Prasanth Cotton sent at 2/15/2021  1:47 PM EST -----  Regarding: care gap request  02/15/21 1:47 PM    Hello, our patient attached above has had a colonoscopy completed/performed  Please assist in updating the patient chart by obtaining a copy of his colonoscopy done by Yamileth Murray  The date of service is 01/25/2019      Thank you,  Prasanth Cotton  [facility]

## 2021-02-16 NOTE — TELEPHONE ENCOUNTER
Upon review of the In Basket request and the patient's chart, initial outreach has been made via fax, please see Contacts section for details       Thank you  Erika Isabel MD [phone] Fax: [phone]

## 2021-02-18 ENCOUNTER — APPOINTMENT (OUTPATIENT)
Dept: PHYSICAL THERAPY | Facility: CLINIC | Age: 56
End: 2021-02-18
Payer: COMMERCIAL

## 2021-02-18 NOTE — TELEPHONE ENCOUNTER
Upon review of the In Basket request we were able to locate, review, and update the patient chart as requested for CRC: Colonoscopy  Any additional questions or concerns should be emailed to the Practice Liaisons via [email or web address] email, please do not reply via In Basket      Thank you  Erika Koo

## 2021-02-19 ENCOUNTER — APPOINTMENT (OUTPATIENT)
Dept: PHYSICAL THERAPY | Facility: CLINIC | Age: 56
End: 2021-02-19
Payer: COMMERCIAL

## 2021-02-24 ENCOUNTER — APPOINTMENT (OUTPATIENT)
Dept: PHYSICAL THERAPY | Facility: CLINIC | Age: 56
End: 2021-02-24
Payer: COMMERCIAL

## 2021-02-25 ENCOUNTER — EVALUATION (OUTPATIENT)
Dept: PHYSICAL THERAPY | Facility: CLINIC | Age: 56
End: 2021-02-25
Payer: COMMERCIAL

## 2021-02-25 DIAGNOSIS — M51.36 DDD (DEGENERATIVE DISC DISEASE), LUMBAR: ICD-10-CM

## 2021-02-25 DIAGNOSIS — M54.16 LUMBAR RADICULITIS: Primary | ICD-10-CM

## 2021-02-25 DIAGNOSIS — M47.816 LUMBAR FACET ARTHROPATHY: ICD-10-CM

## 2021-02-25 PROCEDURE — 97140 MANUAL THERAPY 1/> REGIONS: CPT | Performed by: PHYSICAL THERAPIST

## 2021-02-25 NOTE — PROGRESS NOTES
Daily Note     Today's date: 2021  Patient name: Cassidy Scott  : 1965  MRN: [id number]  Referring provider: Olga Guajardo PA-C  Dx:   Encounter Diagnosis     ICD-10-CM    1  Lumbar radiculitis  M54 16    2  DDD (degenerative disc disease), lumbar  M51 36    3  Lumbar facet arthropathy  M47 816                   Subjective: Pt presents today for the first time since 2021 due to weather conflict  Pt presents today stating that the last week or so he has been feeling pretty well  Pt reports pain at worst have been a 6/10 within the last week, but at base line still having periods of time without pain, and states he has not had L or R leg pain since s/p last visit 2 5 weeks ago  Pt reports he is nervous if he stops taking the medication his symptoms may change  Pt reports performing HEP 2xs before he leaves the house in the AM, and a few times while he is out working  Pt reports that he is content with his progression and feel confident with his HEP and feels he is ready for DC to HEP  Pt follows back up with Dr Shukri Encinas on 3/2/2021  Objective: See treatment diary below  LS Flex 75 Ext 25 SB R 25 SB L 25Rot full B  Strength  Hip L Flex 5 Ext 5 Abd 5 Add 5 R Flex 5 Ext 5 Abd 5 Add 5    Assessment:  Pt at this time demonstrates that he has likely achieved back to baseline of his history of chronic back pain  He is centralized and no longer having any radicular symptoms  He has achieved all goals sought out for him as well as by him with exception of pain levels but he is comfortably 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 currently  He is likely safe to DC to HEP and if he is to have a decline in any form such as return of radicular symptoms he is welcome to return as needed    In regards of managing his baseline chronic history it is likely best to follow up with pain management to trial different interventions that may assist with pt best   Thank you very much for this kind and motivated referral      Plan: Continue per plan of care  Precautions: HTN DM2      Manuals 12/10 12/21 1/7 1/28 2/4 2/8 2/25      Prone PA trial G1-3 L1-S1  10 min 10 min 10 5 5- TS          assessment x 15 15         LTR to R        5 5  - TS              assessment x 25  Neuro Re-Ed             Postural Ed 10 Min            TA draw review with Transfer  5 min           Pball Press Down stand  6" x 20 nv  6" x 20 6" x 20        Pball Pressdown Hip Abd/Ext  2 x 10 nv  2 x 10        Tband Row + ext  GTB 2 x 10  nv  nv BTB 2 x 10        Tband Multi Rot  RTB 2 x 10 nv  nv                     Ther Ex             EIS 4 x 10 2 x 10 nv 2 x 10 2 x 10 2 x 10        EIS progression?              Gastroc Wedge ST  30" x 4 nv 30" x4 20" x 4 20"  x4       Seated Ext with Bolster     3" x 20 3" x 20        Scap Add   10" x 10 nv 10" x 10 10" x 10 10" x 10        LTR to R       2 x 10 2 x 10        R SGIS (L UE on Wall)     nv 2 x 10                     Ther Activity             Sit to stand             Mini squats             Gait Training                                       Modalities             Prone HP prn

## 2021-03-02 ENCOUNTER — OFFICE VISIT (OUTPATIENT)
Dept: PAIN MEDICINE | Facility: CLINIC | Age: 56
End: 2021-03-02
Payer: COMMERCIAL

## 2021-03-02 VITALS
BODY MASS INDEX: 30.94 KG/M2 | TEMPERATURE: 98.5 F | DIASTOLIC BLOOD PRESSURE: 83 MMHG | SYSTOLIC BLOOD PRESSURE: 144 MMHG | HEIGHT: 71 IN | WEIGHT: 221 LBS | HEART RATE: 73 BPM

## 2021-03-02 DIAGNOSIS — M47.816 LUMBAR FACET ARTHROPATHY: ICD-10-CM

## 2021-03-02 DIAGNOSIS — M54.50 CHRONIC BILATERAL LOW BACK PAIN WITHOUT SCIATICA: Primary | ICD-10-CM

## 2021-03-02 DIAGNOSIS — M54.16 LUMBAR RADICULITIS: ICD-10-CM

## 2021-03-02 DIAGNOSIS — G89.29 CHRONIC BILATERAL LOW BACK PAIN WITHOUT SCIATICA: Primary | ICD-10-CM

## 2021-03-02 PROCEDURE — 99214 OFFICE O/P EST MOD 30 MIN: CPT | Performed by: NURSE PRACTITIONER

## 2021-03-02 RX ORDER — GABAPENTIN 300 MG/1
300 CAPSULE ORAL 3 TIMES DAILY
Qty: 90 CAPSULE | Refills: 1 | Status: SHIPPED | OUTPATIENT
Start: 2021-03-02 | End: 2021-04-06 | Stop reason: SDUPTHER

## 2021-03-02 RX ORDER — MELOXICAM 15 MG/1
15 TABLET ORAL DAILY
Qty: 30 TABLET | Refills: 1 | Status: SHIPPED | OUTPATIENT
Start: 2021-03-02 | End: 2021-04-06 | Stop reason: SDUPTHER

## 2021-03-02 NOTE — PROGRESS NOTES
Assessment:  1  Chronic bilateral low back pain without sciatica    2  Lumbar facet arthropathy    3  Lumbar radiculitis        Plan:  1  I will order an MRI of the lumbar spine without contrast  2  Patient may continue gabapentin 300 mg 3 times a day and meloxicam 15 mg daily p r n  as prescribed  These medications were refilled today  3  The patient will continue with physical therapy and his home exercise program  4  The patient wishes to follow up based off of MRI results and treatment options    M*Modal software was used to dictate this note  It may contain errors with dictating incorrect words or incorrect spelling  Please contact the provider directly with any questions  History of Present Illness: The patient is a 63 y o  male with a history of diabetes last seen on 1/21/21 who presents for a follow up office visit in regards to chronic lumbosacral back pain that is nonradiating into the lower extremities  The patient has completed physical therapy and has noticed some mild improvement  He does continue with back pain pretty regularly  He is managing his pain with gabapentin 300 mg 3 times a day and meloxicam 15 mg daily p r n  with a 40% improvement of his pain without side effects  He rates his pain a 5/10 on the numeric pain rating scale  States the pain is occasional in nature and bothersome the evening  He characterizes the pain as sharp and shooting  I have personally reviewed and/or updated the patient's past medical history, past surgical history, family history, social history, current medications, allergies, and vital signs today  Review of Systems:    Review of Systems   Respiratory: Negative for shortness of breath  Cardiovascular: Negative for chest pain  Gastrointestinal: Negative for constipation, diarrhea, nausea and vomiting  Musculoskeletal: Negative for arthralgias, gait problem, joint swelling and myalgias  Skin: Negative for rash     Neurological: Negative for dizziness, seizures and weakness  All other systems reviewed and are negative          Past Medical History:   Diagnosis Date    Diabetes mellitus ([address] )     GERD (gastroesophageal reflux disease)     Hyperlipidemia     Hypertension        Past Surgical History:   Procedure Laterality Date    ASD REPAIR      COLONOSCOPY  01/25/2019    Due 1/2024    HERNIA REPAIR      rpr umbil johny reduc >5 yr    PSA,SCREENING (HISTORICAL)  01/28/2019    WISDOM TOOTH EXTRACTION         Family History   Problem Relation Age of Onset    Diabetes Mother     Hypertension Mother     Tuberculosis Father     Diabetes Maternal Grandfather        Social History     Occupational History    Not on file   Tobacco Use    Smoking status: Current Every Day Smoker     Packs/day: 0 50     Types: Cigarettes     Start date: 1978    Smokeless tobacco: Current User   Substance and Sexual Activity    Alcohol use: Yes     Frequency: 2-4 times a month     Drinks per session: 3 or 4    Drug use: Never    Sexual activity: Not on file         Current Outpatient Medications:     aspirin (ECOTRIN LOW STRENGTH) 81 mg EC tablet, Take 81 mg by mouth daily, Disp: , Rfl:     citalopram (CeleXA) 10 mg tablet, Take 1 tablet (10 mg total) by mouth daily, Disp: 90 tablet, Rfl: 2    fenofibrate (TRICOR) 145 mg tablet, Take 145 mg by mouth daily, Disp: , Rfl:     gabapentin (NEURONTIN) 300 mg capsule, Take 1 capsule (300 mg total) by mouth 3 (three) times a day, Disp: 90 capsule, Rfl: 1    insulin glargine (Lantus SoloStar) 100 units/mL injection pen, Inject 30 units twice a day  E11 65, Disp: , Rfl:     irbesartan (AVAPRO) 300 mg tablet, Take 300 mg by mouth daily, Disp: , Rfl:     lansoprazole (PREVACID) 15 mg capsule, Take 15 mg by mouth daily, Disp: , Rfl:     liraglutide (Victoza) injection, Inject 1 8 mg under the skin daily, Disp: , Rfl:     meloxicam (MOBIC) 15 mg tablet, Take 1 tablet (15 mg total) by mouth daily, Disp: 30 tablet, Rfl: 1    metFORMIN (GLUCOPHAGE) 1000 MG tablet, Take 1,000 mg by mouth 2 (two) times a day, Disp: , Rfl:     rosuvastatin (CRESTOR) 20 MG tablet, Take 1 tablet (20 mg total) by mouth daily, Disp: 90 tablet, Rfl: 2    Allergies   Allergen Reactions    Dust Mite Extract     Molds & Smuts     Pollen Extract      Maple trees    Strawberry Extract Other (See Comments)       Physical Exam:    /83   Pulse 73   Temp 98 5 °F (36 9 °C)   Ht 5' 11" (1 803 m)   Wt 100 kg (221 lb)   BMI 30 82 kg/m²     Constitutional:normal, well developed, well nourished, alert, in no distress and non-toxic and no overt pain behavior  Eyes:anicteric  HEENT:grossly intact  Neck:supple, symmetric, trachea midline and no masses   Pulmonary:even and unlabored  Cardiovascular:No edema or pitting edema present  Skin:Normal without rashes or lesions and well hydrated  Psychiatric:Mood and affect appropriate  Neurologic:Cranial Nerves II-XII grossly intact  Musculoskeletal:Slightly antalgic gait but steady without the use of assistive devices      Imaging  MRI lumbar spine without contrast    (Results Pending)     LUMBAR SPINE     INDICATION:   M54 16: Radiculopathy, lumbar region      COMPARISON:  None     VIEWS:  XR SPINE LUMBAR MINIMUM 4 VIEWS NON INJURY        FINDINGS:     There are 5 non rib bearing lumbar vertebral bodies       There is no evidence of acute fracture or destructive osseous lesion      Alignment is unremarkable    No abnormal motion on flexion/extension views      Mild multilevel degenerative disc disease and moderate osteoarthritis of the lower lumbar facet joints      The pedicles appear intact      Soft tissues are unremarkable      IMPRESSION:     No acute osseous abnormality        Degenerative changes as described        Orders Placed This Encounter   Procedures    MRI lumbar spine without contrast

## 2021-03-10 DIAGNOSIS — Z23 ENCOUNTER FOR IMMUNIZATION: ICD-10-CM

## 2021-03-19 NOTE — PROGRESS NOTES
PT Discharge    Today's date: 3/19/2021  Patient name: Magdy Laureano  : 1965  MRN: [id number]  Referring provider: Mell Velazquez  Dx:   Encounter Diagnosis     ICD-10-CM    1  Lumbar radiculitis  M54 16    2  DDD (degenerative disc disease), lumbar  M51 36    3  Lumbar facet arthropathy  M47 816        Start Time: 1705  Stop Time: 1730  Total time in clinic (min): 25 minutes    Assessment/Plan  Pt has not been present since 2021  Pt's chart will be DC in compliance of facility policy as all Charts are DC within 30 days of last scheduled visit          Subjective    Objective

## 2021-03-20 ENCOUNTER — IMMUNIZATIONS (OUTPATIENT)
Dept: FAMILY MEDICINE CLINIC | Facility: HOSPITAL | Age: 56
End: 2021-03-20

## 2021-03-20 DIAGNOSIS — Z23 ENCOUNTER FOR IMMUNIZATION: Primary | ICD-10-CM

## 2021-03-20 PROCEDURE — 91300 SARS-COV-2 / COVID-19 MRNA VACCINE (PFIZER-BIONTECH) 30 MCG: CPT

## 2021-03-20 PROCEDURE — 0001A SARS-COV-2 / COVID-19 MRNA VACCINE (PFIZER-BIONTECH) 30 MCG: CPT

## 2021-03-24 ENCOUNTER — TELEPHONE (OUTPATIENT)
Dept: PAIN MEDICINE | Facility: CLINIC | Age: 56
End: 2021-03-24

## 2021-03-24 NOTE — TELEPHONE ENCOUNTER
S/W pt  Advised pt of the same  Pt stated he already got the results  Pt stated he wants to know what the next step is? Offered to schedule pt for SOVS   Pt declined and wanted to ask over the phone what the next step is since the MRI results are back b/c he has a $50 copay  Please advise

## 2021-03-24 NOTE — TELEPHONE ENCOUNTER
Patient has degenerative disc disease and arthritis causing mild central and mild foraminal stenosis from L1-2 to L5-S1

## 2021-03-24 NOTE — TELEPHONE ENCOUNTER
Pt is calling for his MRI results  They are in Epic in media, was done at [facility]   Ph# [phone]

## 2021-04-06 ENCOUNTER — OFFICE VISIT (OUTPATIENT)
Dept: PAIN MEDICINE | Facility: CLINIC | Age: 56
End: 2021-04-06
Payer: COMMERCIAL

## 2021-04-06 VITALS
HEIGHT: 71 IN | WEIGHT: 223 LBS | BODY MASS INDEX: 31.22 KG/M2 | TEMPERATURE: 97.9 F | HEART RATE: 77 BPM | SYSTOLIC BLOOD PRESSURE: 123 MMHG | DIASTOLIC BLOOD PRESSURE: 75 MMHG

## 2021-04-06 DIAGNOSIS — M47.816 LUMBAR SPONDYLOSIS: ICD-10-CM

## 2021-04-06 DIAGNOSIS — M54.16 RADICULOPATHY, LUMBAR REGION: ICD-10-CM

## 2021-04-06 DIAGNOSIS — M51.36 DDD (DEGENERATIVE DISC DISEASE), LUMBAR: ICD-10-CM

## 2021-04-06 DIAGNOSIS — M47.816 LUMBAR FACET ARTHROPATHY: ICD-10-CM

## 2021-04-06 DIAGNOSIS — M54.16 LUMBAR RADICULITIS: ICD-10-CM

## 2021-04-06 DIAGNOSIS — G89.4 CHRONIC PAIN SYNDROME: Primary | ICD-10-CM

## 2021-04-06 PROCEDURE — 99214 OFFICE O/P EST MOD 30 MIN: CPT | Performed by: NURSE PRACTITIONER

## 2021-04-06 RX ORDER — GABAPENTIN 300 MG/1
300 CAPSULE ORAL 3 TIMES DAILY
Qty: 90 CAPSULE | Refills: 2 | Status: SHIPPED | OUTPATIENT
Start: 2021-04-06 | End: 2022-01-25

## 2021-04-06 RX ORDER — MELOXICAM 15 MG/1
15 TABLET ORAL DAILY
Qty: 30 TABLET | Refills: 2 | Status: SHIPPED | OUTPATIENT
Start: 2021-04-06 | End: 2022-01-25

## 2021-04-06 NOTE — PROGRESS NOTES
Assessment:  1  Chronic pain syndrome    2  Radiculopathy, lumbar region    3  Lumbar radiculitis    4  DDD (degenerative disc disease), lumbar    5  Lumbar facet arthropathy    6  Lumbar spondylosis        Plan:  1  We will schedule the patient for bilateral L3-5medial branch blocks with intention of moving forward towards radiofrequency ablation if there is an appropriate diagnostic response  The initial blocks will be performed with 2% lidocaine and if an appropriate response is obtained upon review of the patient's pain diary, a confirmatory block will be scheduled  Complete risks and benefits including bleeding, infection, tissue reaction, nerve injury and allergic reaction were discussed  The patient was agreeable and verbalized an understanding  2  The patient may continue gabapentin 300 mg 3 times a day and meloxicam 15 mg daily p r n  as prescribed  Both of these medications were refilled today   3  May consider bilateral SI joint injections in the future   4  The patient will continue with his home exercise program as taught to him by physical therapy  5  Patient will follow-up in 3 months or sooner if needed     M*Modal software was used to dictate this note  It may contain errors with dictating incorrect words or incorrect spelling  Please contact the provider directly with any questions  History of Present Illness: The patient is a 63 y o  male with a history of diabetes last seen on 3/2/21 who presents for a follow up office visit in regards to chronic mid axial lumbosacral back pain that is nonradiating into the lower extremities  The patient states he has experienced leg pain in the past, however not since being on the gabapentin  MRI of the lumbar spine reveals lumbar spondylosis and lumbar degenerative disc disease with various degrees of central and foraminal stenosis from L1-2 to L5-S1  Patient rates his pain a 7/10 on the numeric pain rating scale    He states his pain is intermittent in nature and bothersome the morning and in the evening  He characterizes the pain as sharp  Current pain medications includes:   Gabapentin 300 mg 3 times a day and meloxicam 15 mg daily prn     The patient reports that this regimen is providing 50% pain relief  The patient is reporting no side effects from this pain medication regimen  I have personally reviewed and/or updated the patient's past medical history, past surgical history, family history, social history, current medications, allergies, and vital signs today  Review of Systems:    Review of Systems   Respiratory: Negative for shortness of breath  Cardiovascular: Negative for chest pain  Gastrointestinal: Negative for constipation, diarrhea, nausea and vomiting  Musculoskeletal: Negative for arthralgias, gait problem, joint swelling and myalgias  Skin: Negative for rash  Neurological: Negative for dizziness, seizures and weakness  All other systems reviewed and are negative          Past Medical History:   Diagnosis Date    Diabetes mellitus ([facility] )     GERD (gastroesophageal reflux disease)     Hyperlipidemia     Hypertension        Past Surgical History:   Procedure Laterality Date    ASD REPAIR      COLONOSCOPY  01/25/2019    Due 1/2024    HERNIA REPAIR      rpr umbil johny reduc >5 yr    PSA,SCREENING (HISTORICAL)  01/28/2019    WISDOM TOOTH EXTRACTION         Family History   Problem Relation Age of Onset    Diabetes Mother     Hypertension Mother    Missael [place] Tuberculosis Father     Diabetes Maternal Grandfather        Social History     Occupational History    Not on file   Tobacco Use    Smoking status: Current Every Day Smoker     Packs/day: 0 50     Types: Cigarettes     Start date: 1978    Smokeless tobacco: Current User   Substance and Sexual Activity    Alcohol use: Yes     Frequency: 2-4 times a month     Drinks per session: 3 or 4    Drug use: Never    Sexual activity: Not on file         Current Outpatient Medications:     aspirin (ECOTRIN LOW STRENGTH) 81 mg EC tablet, Take 81 mg by mouth daily, Disp: , Rfl:     citalopram (CeleXA) 10 mg tablet, Take 1 tablet (10 mg total) by mouth daily, Disp: 90 tablet, Rfl: 2    fenofibrate (TRICOR) 145 mg tablet, Take 145 mg by mouth daily, Disp: , Rfl:     gabapentin (NEURONTIN) 300 mg capsule, Take 1 capsule (300 mg total) by mouth 3 (three) times a day, Disp: 90 capsule, Rfl: 2    insulin glargine (Lantus SoloStar) 100 units/mL injection pen, Inject 30 units twice a day  E11 65, Disp: , Rfl:     irbesartan (AVAPRO) 300 mg tablet, Take 300 mg by mouth daily, Disp: , Rfl:     lansoprazole (PREVACID) 15 mg capsule, Take 15 mg by mouth daily, Disp: , Rfl:     liraglutide (Victoza) injection, Inject 1 8 mg under the skin daily, Disp: , Rfl:     meloxicam (MOBIC) 15 mg tablet, Take 1 tablet (15 mg total) by mouth daily, Disp: 30 tablet, Rfl: 2    metFORMIN (GLUCOPHAGE) 1000 MG tablet, Take 1,000 mg by mouth 2 (two) times a day, Disp: , Rfl:     rosuvastatin (CRESTOR) 20 MG tablet, Take 1 tablet (20 mg total) by mouth daily, Disp: 90 tablet, Rfl: 2    Allergies   Allergen Reactions    Dust Mite Extract     Molds & Smuts     Pollen Extract      Maple trees    Strawberry Extract - Food Allergy Other (See Comments)       Physical Exam:    /75   Pulse 77   Temp 97 9 °F (36 6 °C)   Ht 5' 11" (1 803 m)   Wt 101 kg (223 lb)   BMI 31 10 kg/m²     Constitutional:normal, well developed, well nourished, alert, in no distress and non-toxic and no overt pain behavior  Eyes:anicteric  HEENT:grossly intact  Neck:supple, symmetric, trachea midline and no masses   Pulmonary:even and unlabored  Cardiovascular:No edema or pitting edema present  Skin:Normal without rashes or lesions and well hydrated  Psychiatric:Mood and affect appropriate  Neurologic:Cranial Nerves II-XII grossly intact  Musculoskeletal:normal gait  Lumbar paraspinal musculature mildly tender to palpation  Bilateral SI joints nontender to palpation  Lumbar facet loading maneuvers reproduces patient's low back pain complaints  Bilateral lower extremity strength 5/5 in all muscle groups  Sensation intact to light touch in L3 through S1 dermatomes bilaterally  Positive Valeriy's test bilaterally  Negative Gaenslen's and AP compression test bilaterally  Negative straight leg raise bilaterally        Imaging  FL spine and pain procedure    (Results Pending)     Imaging reviewed    Orders Placed This Encounter   Procedures    FL spine and pain procedure

## 2021-04-10 ENCOUNTER — IMMUNIZATIONS (OUTPATIENT)
Dept: FAMILY MEDICINE CLINIC | Facility: HOSPITAL | Age: 56
End: 2021-04-10

## 2021-04-10 DIAGNOSIS — Z23 ENCOUNTER FOR IMMUNIZATION: Primary | ICD-10-CM

## 2021-04-10 PROCEDURE — 0002A SARS-COV-2 / COVID-19 MRNA VACCINE (PFIZER-BIONTECH) 30 MCG: CPT

## 2021-04-10 PROCEDURE — 91300 SARS-COV-2 / COVID-19 MRNA VACCINE (PFIZER-BIONTECH) 30 MCG: CPT

## 2021-04-13 ENCOUNTER — HOSPITAL ENCOUNTER (OUTPATIENT)
Dept: RADIOLOGY | Facility: CLINIC | Age: 56
Discharge: HOME | End: 2021-04-13
Attending: ANESTHESIOLOGY | Admitting: ANESTHESIOLOGY
Payer: COMMERCIAL

## 2021-04-13 VITALS
HEART RATE: 89 BPM | OXYGEN SATURATION: 95 % | TEMPERATURE: 99.1 F | SYSTOLIC BLOOD PRESSURE: 131 MMHG | RESPIRATION RATE: 20 BRPM | DIASTOLIC BLOOD PRESSURE: 77 MMHG

## 2021-04-13 DIAGNOSIS — M47.816 LUMBAR SPONDYLOSIS: ICD-10-CM

## 2021-04-13 PROCEDURE — 64494 INJ PARAVERT F JNT L/S 2 LEV: CPT | Performed by: ANESTHESIOLOGY

## 2021-04-13 PROCEDURE — 64493 INJ PARAVERT F JNT L/S 1 LEV: CPT | Performed by: ANESTHESIOLOGY

## 2021-04-13 RX ORDER — LIDOCAINE HYDROCHLORIDE 10 MG/ML
10 INJECTION, SOLUTION EPIDURAL; INFILTRATION; INTRACAUDAL; PERINEURAL ONCE
Status: COMPLETED | OUTPATIENT
Start: 2021-04-13 | End: 2021-04-13

## 2021-04-13 RX ADMIN — LIDOCAINE HYDROCHLORIDE 5 ML: 10 INJECTION, SOLUTION EPIDURAL; INFILTRATION; INTRACAUDAL; PERINEURAL at 15:24

## 2021-04-13 RX ADMIN — LIDOCAINE HYDROCHLORIDE 3 ML: 20 INJECTION, SOLUTION EPIDURAL; INFILTRATION; INTRACAUDAL; PERINEURAL at 15:25

## 2021-04-13 NOTE — H&P
History of Present Illness: The patient is a 63 y o  male who presents with complaints of   Low back pain      Patient Active Problem List   Diagnosis    Type 2 diabetes mellitus without complication, with long-term current use of insulin ([facility] )    Essential hypertension    Mixed hyperlipidemia    Adjustment disorder with withdrawal    Leukocytosis    Tobacco use    EILEEN (obstructive sleep apnea)    PIERRE (generalized anxiety disorder)    GERD (gastroesophageal reflux disease)    Colon polyps    Hip pain    Radiculopathy, lumbar region    DDD (degenerative disc disease), lumbar    Lumbar facet arthropathy    Lumbar radiculitis    Chronic pain syndrome       Past Medical History:   Diagnosis Date    Diabetes mellitus ([facility] )     GERD (gastroesophageal reflux disease)     Hyperlipidemia     Hypertension        Past Surgical History:   Procedure Laterality Date    ASD REPAIR      COLONOSCOPY  01/25/2019    Due 1/2024    HERNIA REPAIR      rpr umbil johny reduc >5 yr    PSA,SCREENING (HISTORICAL)  01/28/2019    WISDOM TOOTH EXTRACTION           Current Outpatient Medications:     aspirin (ECOTRIN LOW STRENGTH) 81 mg EC tablet, Take 81 mg by mouth daily, Disp: , Rfl:     citalopram (CeleXA) 10 mg tablet, Take 1 tablet (10 mg total) by mouth daily, Disp: 90 tablet, Rfl: 2    fenofibrate (TRICOR) 145 mg tablet, Take 145 mg by mouth daily, Disp: , Rfl:     gabapentin (NEURONTIN) 300 mg capsule, Take 1 capsule (300 mg total) by mouth 3 (three) times a day, Disp: 90 capsule, Rfl: 2    insulin glargine (Lantus SoloStar) 100 units/mL injection pen, Inject 30 units twice a day  E11 65, Disp: , Rfl:     irbesartan (AVAPRO) 300 mg tablet, Take 300 mg by mouth daily, Disp: , Rfl:     lansoprazole (PREVACID) 15 mg capsule, Take 15 mg by mouth daily, Disp: , Rfl:     liraglutide (Victoza) injection, Inject 1 8 mg under the skin daily, Disp: , Rfl:     meloxicam (MOBIC) 15 mg tablet, Take 1 tablet (15 mg total) by mouth daily, Disp: 30 tablet, Rfl: 2    metFORMIN (GLUCOPHAGE) 1000 MG tablet, Take 1,000 mg by mouth 2 (two) times a day, Disp: , Rfl:     rosuvastatin (CRESTOR) 20 MG tablet, Take 1 tablet (20 mg total) by mouth daily, Disp: 90 tablet, Rfl: 2    Current Facility-Administered Medications:     lidocaine (PF) (XYLOCAINE-MPF) 1 % injection 10 mL, 10 mL, Other, Once, Kevin Almeida, DO    lidocaine (PF) (XYLOCAINE-MPF) 2 % injection 4 mL, 4 mL, Other, Once, Kevin Almeida, DO    Allergies   Allergen Reactions    Dust Mite Extract     Molds & Smuts     Pollen Extract      Maple trees    Strawberry Extract - Food Allergy Other (See Comments)       Physical Exam:   Vitals:    04/13/21 1501   BP: 115/72   Pulse: 89   Resp: 20   Temp: 99 1 °F (37 3 °C)   SpO2: 94%     General: Awake, Alert, Oriented x 3, Mood and affect appropriate  Respiratory: Respirations even and unlabored  Cardiovascular: Peripheral pulses intact; no edema  Musculoskeletal Exam:   Bilateral lumbar paraspinals tender to palpation  ASA Score: 3         Assessment:   1   Lumbar spondylosis        Plan: B/L L3-5 MBB

## 2021-04-13 NOTE — DISCHARGE INSTRUCTIONS
ACTIVITY  · Please do activities that will bring on the normal pain that we are rating  For example, if vacuuming or walking increases the pain, do that  This will give the most accurate response to the diary  · You may shower, but no tub baths today, or applied heat  CARE OF THE INJECTION SITE  · This area may be numb for several hours after the injection  · Notify [facility] if you have any of the following:  redness, drainage, swelling, or fever above 100°F     SPECIAL INSTRUCTIONS  · Please return the MBB diary to our office by mail, fax, or drop it off  MEDICATIONS  · Please do not take any break through or short acting pain medications for 8 hours after the block  · Continue to take all routine medications  · Our office may have instructed you to hold some medications  As no general anesthesia was used in today's procedure, you should not experience any side effects related to anesthesia  If you have a problem specifically related to your procedure, please call our office at [phone]  Problems not related to your procedure should be directed to your primary care physician

## 2021-04-14 ENCOUNTER — TELEPHONE (OUTPATIENT)
Dept: RADIOLOGY | Facility: CLINIC | Age: 56
End: 2021-04-14

## 2021-04-19 NOTE — TELEPHONE ENCOUNTER
Patient called returning procedure  call  Please be advise thank you          Please call patient back @ [phone]

## 2021-04-21 DIAGNOSIS — M47.816 LUMBAR SPONDYLOSIS: Primary | ICD-10-CM

## 2021-04-21 NOTE — TELEPHONE ENCOUNTER
Patient contacted and scheduled for B/L L3,L4,L5 MBB # 2   All pre procedure instructions were given to patient   Nothing to eat or drink for 1 hour prior  Loose fitting clothing   NSAIDS, ANTICOAG'S OKAY   Denies Antibx   NO PRN PAIN MEDS 6 HOURS PRIOR   Needs    Patient directed to call the office if becomes sick, as we will most likely reschedule       Insurance auth received but is not a guarantee of payment per your insurance company's authorization disclaimer and it is your responsibility to verify your benefits     COVID -19 screening complete

## 2021-05-04 ENCOUNTER — HOSPITAL ENCOUNTER (OUTPATIENT)
Dept: RADIOLOGY | Facility: CLINIC | Age: 56
Discharge: HOME | End: 2021-05-04
Attending: ANESTHESIOLOGY | Admitting: ANESTHESIOLOGY
Payer: COMMERCIAL

## 2021-05-04 VITALS
OXYGEN SATURATION: 98 % | SYSTOLIC BLOOD PRESSURE: 135 MMHG | HEART RATE: 88 BPM | DIASTOLIC BLOOD PRESSURE: 78 MMHG | RESPIRATION RATE: 20 BRPM | TEMPERATURE: 97.9 F

## 2021-05-04 DIAGNOSIS — M47.816 LUMBAR SPONDYLOSIS: ICD-10-CM

## 2021-05-04 PROCEDURE — 64494 INJ PARAVERT F JNT L/S 2 LEV: CPT | Performed by: ANESTHESIOLOGY

## 2021-05-04 PROCEDURE — 64493 INJ PARAVERT F JNT L/S 1 LEV: CPT | Performed by: ANESTHESIOLOGY

## 2021-05-04 RX ORDER — BUPIVACAINE HYDROCHLORIDE 5 MG/ML
10 INJECTION, SOLUTION EPIDURAL; INTRACAUDAL ONCE
Status: COMPLETED | OUTPATIENT
Start: 2021-05-04 | End: 2021-05-04

## 2021-05-04 RX ORDER — LIDOCAINE HYDROCHLORIDE 10 MG/ML
5 INJECTION, SOLUTION EPIDURAL; INFILTRATION; INTRACAUDAL; PERINEURAL ONCE
Status: COMPLETED | OUTPATIENT
Start: 2021-05-04 | End: 2021-05-04

## 2021-05-04 RX ADMIN — LIDOCAINE HYDROCHLORIDE 3 ML: 10 INJECTION, SOLUTION EPIDURAL; INFILTRATION; INTRACAUDAL; PERINEURAL at 15:47

## 2021-05-04 RX ADMIN — BUPIVACAINE HYDROCHLORIDE 3 ML: 5 INJECTION, SOLUTION EPIDURAL; INTRACAUDAL at 15:50

## 2021-05-04 NOTE — DISCHARGE INSTRUCTIONS
ACTIVITY  · Please do activities that will bring on the normal pain that we are rating  For example, if vacuuming or walking increases the pain, do that  This will give the most accurate response to the diary  · You may shower, but no tub baths today, or applied heat  CARE OF THE INJECTION SITE  · This area may be numb for several hours after the injection  · Notify [facility] if you have any of the following:  redness, drainage, swelling, or fever above 100°F     SPECIAL INSTRUCTIONS  · Please return the MBB diary to our office by mail, fax, or drop it off  MEDICATIONS  · Please do not take any break through or short acting pain medications for 8 hours after the block  · Continue to take all routine medications  · Our office may have instructed you to hold some medications  As no general anesthesia was used in today's procedure, you should not experience any side effects related to anesthesia  If you have a problem specifically related to your procedure, please call our office at [phone]  Problems not related to your procedure should be directed to your primary care physician

## 2021-05-04 NOTE — H&P
History of Present Illness: The patient is a 63 y o  male who presents with complaints of   Low back pain      Patient Active Problem List   Diagnosis    Type 2 diabetes mellitus without complication, with long[facility]term [facility] ([facility] )    Essential hypertension    Mixed hyperlipidemia    Adjustment disorder with withdrawal    Leukocytosis    Tobacco use    EILEEN (obstructive sleep apnea)    PIERRE (generalized anxiety disorder)    GERD (gastroesophageal reflux disease)    Colon polyps    Hip pain    Radiculopathy, lumbar region    DDD (degenerative disc disease), lumbar    Lumbar facet arthropathy    Lumbar radiculitis    Chronic pain syndrome    Lumbar spondylosis       Past Medical History:   Diagnosis Date    Diabetes mellitus ([address] )     GERD (gastroesophageal reflux disease)     Hyperlipidemia     Hypertension        Past Surgical History:   Procedure Laterality Date    ASD REPAIR      COLONOSCOPY  01/25/2019    Due 1/2024    HERNIA REPAIR      rpr umbil johny reduc >5 yr    PSA,SCREENING (HISTORICAL)  01/28/2019    WISDOM TOOTH EXTRACTION           Current Outpatient Medications:     aspirin (ECOTRIN LOW STRENGTH) 81 mg EC tablet, Take 81 mg by mouth daily, Disp: , Rfl:     citalopram (CeleXA) 10 mg tablet, Take 1 tablet (10 mg total) by mouth daily, Disp: 90 tablet, Rfl: 2    fenofibrate (TRICOR) 145 mg tablet, Take 145 mg by mouth daily, Disp: , Rfl:     gabapentin (NEURONTIN) 300 mg capsule, Take 1 capsule (300 mg total) by mouth 3 (three) times a day, Disp: 90 capsule, Rfl: 2    insulin glargine (Lantus SoloStar) 100 units/mL injection pen, Inject 30 units twice a day  E11 65, Disp: , Rfl:     irbesartan (AVAPRO) 300 mg tablet, Take 300 mg by mouth daily, Disp: , Rfl:     lansoprazole (PREVACID) 15 mg capsule, Take 15 mg by mouth daily, Disp: , Rfl:     liraglutide (Victoza) injection, Inject 1 8 mg under the skin daily, Disp: , Rfl:     meloxicam (MOBIC) 15 mg tablet, Take 1 tablet (15 mg total) by mouth daily, Disp: 30 tablet, Rfl: 2    metFORMIN (GLUCOPHAGE) 1000 MG tablet, Take 1,000 mg by mouth 2 (two) times a day, Disp: , Rfl:     rosuvastatin (CRESTOR) 20 MG tablet, Take 1 tablet (20 mg total) by mouth daily, Disp: 90 tablet, Rfl: 2  No current facility-administered medications for this encounter  Allergies   Allergen Reactions    Dust Mite Extract     Molds & Smuts     Pollen Extract      Maple trees    Strawberry Extract - Food Allergy Other (See Comments)       Physical Exam:   Vitals:    05/04/21 1549   BP: 135/78   Pulse: 88   Resp: 20   Temp:    SpO2: 98%     General: Awake, Alert, Oriented x 3, Mood and affect appropriate  Respiratory: Respirations even and unlabored  Cardiovascular: Peripheral pulses intact; no edema  Musculoskeletal Exam:   Bilateral lumbar paraspinals tender to palpation  ASA Score: 3    Patient/Chart Verification  Patient ID Verified: Verbal  ID Band Applied: No  Consents Confirmed: Procedural  H&P( within 30 days) Verified: To be obtained in the Pre-Procedure area  Interval H&P(within 24 hr) Complete (required for Outpatients and Surgery Admit only): To be obtained in the Pre-Procedure area  Allergies Reviewed: Yes  Anticoag/NSAID held?: No  Currently on antibiotics?: No  Pre-op Lab/Test Results Available: N/A    Assessment:   1   Lumbar spondylosis        Plan: B/L L3,L4,L5 MBB # 2

## 2021-05-12 ENCOUNTER — TELEPHONE (OUTPATIENT)
Dept: RADIOLOGY | Facility: CLINIC | Age: 56
End: 2021-05-12

## 2021-05-12 DIAGNOSIS — M47.816 LUMBAR SPONDYLOSIS: Primary | ICD-10-CM

## 2021-05-12 NOTE — TELEPHONE ENCOUNTER
Patient contacted and scheduled for RIGHT & LEFT  L3,L4,L5 RFA  All pre procedure instructions were given to patient   Nothing to eat or drink for 1 hour prior  Loose fitting clothing   NSAIDS, ANTICOAG'S OKAY   Denies Antibx     Needs    Patient directed to call the office if becomes sick, as we will most likely reschedule       Insurance auth received but is not a guarantee of payment per your insurance company's authorization disclaimer and it is your responsibility to verify your benefits     COVID -19 screening complete

## 2021-05-15 DIAGNOSIS — M54.16 LUMBAR RADICULITIS: ICD-10-CM

## 2021-05-15 DIAGNOSIS — F41.9 ANXIETY: ICD-10-CM

## 2021-05-17 RX ORDER — CITALOPRAM 10 MG/1
10 TABLET ORAL DAILY
Qty: 90 TABLET | Refills: 0 | Status: SHIPPED | OUTPATIENT
Start: 2021-05-17 | End: 2021-08-14

## 2021-05-18 ENCOUNTER — OFFICE VISIT (OUTPATIENT)
Dept: FAMILY MEDICINE CLINIC | Facility: CLINIC | Age: 56
End: 2021-05-18
Payer: COMMERCIAL

## 2021-05-18 VITALS
WEIGHT: 225 LBS | TEMPERATURE: 98.2 F | OXYGEN SATURATION: 97 % | HEIGHT: 71 IN | HEART RATE: 78 BPM | SYSTOLIC BLOOD PRESSURE: 106 MMHG | DIASTOLIC BLOOD PRESSURE: 64 MMHG | BODY MASS INDEX: 31.5 KG/M2

## 2021-05-18 DIAGNOSIS — F41.1 GAD (GENERALIZED ANXIETY DISORDER): ICD-10-CM

## 2021-05-18 DIAGNOSIS — K21.9 GASTROESOPHAGEAL REFLUX DISEASE, UNSPECIFIED WHETHER ESOPHAGITIS PRESENT: ICD-10-CM

## 2021-05-18 DIAGNOSIS — I10 ESSENTIAL HYPERTENSION: Primary | ICD-10-CM

## 2021-05-18 DIAGNOSIS — E11.9 TYPE 2 DIABETES MELLITUS WITHOUT COMPLICATION, WITH LONG-TERM CURRENT USE OF INSULIN (HCC): ICD-10-CM

## 2021-05-18 DIAGNOSIS — E78.2 MIXED HYPERLIPIDEMIA: ICD-10-CM

## 2021-05-18 DIAGNOSIS — Z79.4 TYPE 2 DIABETES MELLITUS WITHOUT COMPLICATION, WITH LONG-TERM CURRENT USE OF INSULIN (HCC): ICD-10-CM

## 2021-05-18 PROCEDURE — 99214 OFFICE O/P EST MOD 30 MIN: CPT | Performed by: FAMILY MEDICINE

## 2021-05-18 NOTE — TELEPHONE ENCOUNTER
Unable to close task due to message: You must address all unsigned medications to complete this message

## 2021-05-18 NOTE — ASSESSMENT & PLAN NOTE
BP good  Cont irbesartan 300 mg qd  Pt advised to continue low Na diet and to exercise on a regular basis

## 2021-05-18 NOTE — ASSESSMENT & PLAN NOTE
A1C 7 3 in Jan 2021  Continue metformin 1000 mg bid, lantus 35 U bid, and Victoza 1 8 mg qd  Foot exam done in Jan 2021   Pt to sched eye exam      Lab Results   Component Value Date    HGBA1C 7 3 (H) 01/30/2021

## 2021-05-18 NOTE — PROGRESS NOTES
Assessment/Plan:         Problem List Items Addressed This Visit        Digestive    GERD (gastroesophageal reflux disease)     Has occasional symptoms  Continue prevacid 15 mg qd and GERD diet  Endocrine    Type 2 diabetes mellitus without complication, with long-term current use of insulin (HCC)     A1C 7 3 in Jan 2021  Continue metformin 1000 mg bid, lantus 35 U bid, and Victoza 1 8 mg qd  Foot exam done in Jan 2021  Pt to sched eye exam      Lab Results   Component Value Date    HGBA1C 7 3 (H) 01/30/2021               Cardiovascular and Mediastinum    Essential hypertension - Primary     BP good  Cont irbesartan 300 mg qd  Pt advised to continue low Na diet and to exercise on a regular basis  Other    Mixed hyperlipidemia     Recheck lipids and LFTs  Continue current meds  Relevant Orders    Lipid panel    Hepatic function panel    PIERRE (generalized anxiety disorder)     Stress level ok  Continue citalopram 10 mg qd  Subjective:      Patient ID: Malorie Zheng is a 63 y o  male  Pt here for f/u HTN, HL, DM, GERD, PIERRE  Doing well  No cp/sob  No headaches  No abd pain  BP good  Walks a lot and following low carb diet  Sees Dr Wyatt Alpers in Aug 2021  Had COVID vaccines  Going to schedule ey exam  No numbness in feet  Gets GERD at times  Stress level ok  Still has back pain and sees Pain Mgmt  The following portions of the patient's history were reviewed and updated as appropriate:   Past Medical History:  He has a past medical history of Diabetes mellitus ([address] ), GERD (gastroesophageal reflux disease), Hyperlipidemia, and Hypertension  ,  _______________________________________________________________________  Medical Problems:  does not have any pertinent problems on file ,  _______________________________________________________________________  Past Surgical History:   has a past surgical history that includes Colonoscopy (01/25/2019); ASD repair; Hernia repair; [place] tooth extraction; and PSA,Screening (Historical) (01/28/2019)  ,  _______________________________________________________________________  Family History:  family history includes Diabetes in his maternal grandfather and mother; Hypertension in his mother; Tuberculosis in his father ,  _______________________________________________________________________  Social History:   reports that he has been smoking cigarettes  He started smoking about 43 years ago  He has been smoking about 0 50 packs per day  He uses smokeless tobacco  He reports current alcohol use  He reports that he does not use drugs  ,  _______________________________________________________________________  Allergies:  is allergic to dust mite extract; molds & smuts; pollen extract; and strawberry extract - food allergy     _______________________________________________________________________  Current Outpatient Medications   Medication Sig Dispense Refill    aspirin (ECOTRIN LOW STRENGTH) 81 mg EC tablet Take 81 mg by mouth daily      citalopram (CeleXA) 10 mg tablet Take 1 tablet (10 mg total) by mouth daily 90 tablet 0    gabapentin (NEURONTIN) 300 mg capsule Take 1 capsule (300 mg total) by mouth 3 (three) times a day 90 capsule 2    insulin glargine (Lantus SoloStar) 100 units/mL injection pen Inject 30 units twice a day  E11 65      irbesartan (AVAPRO) 300 mg tablet Take 300 mg by mouth daily      lansoprazole (PREVACID) 15 mg capsule Take 15 mg by mouth daily      liraglutide (Victoza) injection Inject 1 8 mg under the skin daily      meloxicam (MOBIC) 15 mg tablet Take 1 tablet (15 mg total) by mouth daily 30 tablet 2    metFORMIN (GLUCOPHAGE) 1000 MG tablet Take 1,000 mg by mouth 2 (two) times a day      rosuvastatin (CRESTOR) 20 MG tablet Take 1 tablet (20 mg total) by mouth daily 90 tablet 2    fenofibrate (TRICOR) 145 mg tablet Take 145 mg by mouth daily       No current facility-administered medications for this visit       _______________________________________________________________________  Review of Systems   Constitutional: Negative for fatigue and unexpected weight change  Eyes: Negative for visual disturbance  Respiratory: Negative for chest tightness and shortness of breath  Cardiovascular: Negative for chest pain and palpitations  Gastrointestinal: Negative for abdominal pain, constipation, diarrhea, nausea and vomiting  Endocrine: Negative for polydipsia, polyphagia and polyuria  Genitourinary: Negative for frequency  Musculoskeletal: Positive for back pain  Negative for arthralgias  Skin: Negative for rash and wound  Neurological: Negative for numbness  Objective:  Vitals:    05/18/21 1735   BP: 106/64   Pulse: 78   Temp: 98 2 °F (36 8 °C)   SpO2: 97%   Weight: 102 kg (225 lb)   Height: 5' 11" (1 803 m)     Body mass index is 31 38 kg/m²  Physical Exam  Vitals signs and nursing note reviewed  Constitutional:       Appearance: Normal appearance  He is well-developed and normal weight  Neck:      Musculoskeletal: Normal range of motion and neck supple  Thyroid: No thyromegaly  Cardiovascular:      Rate and Rhythm: Normal rate and regular rhythm  Heart sounds: Normal heart sounds  No murmur  Pulmonary:      Effort: Pulmonary effort is normal  No respiratory distress  Breath sounds: Normal breath sounds  No wheezing  Musculoskeletal:      Right lower leg: No edema  Left lower leg: No edema  Lymphadenopathy:      Cervical: No cervical adenopathy  Neurological:      Mental Status: He is alert and oriented to person, place, and time  Cranial Nerves: No cranial nerve deficit  Psychiatric:         Mood and Affect: Mood normal          Behavior: Behavior normal          Thought Content:  Thought content normal          Judgment: Judgment normal

## 2021-05-18 NOTE — TELEPHONE ENCOUNTER
RN s/w pt who states he was advised by his pharmacy that he did not have any refills available  RN s/w Mariana Gonsales at [facility] who confirmed pt has refills available will fill the script for   Pt advised of same and appreciative

## 2021-05-19 RX ORDER — GABAPENTIN 300 MG/1
300 CAPSULE ORAL 3 TIMES DAILY
Qty: 90 CAPSULE | Refills: 0 | OUTPATIENT
Start: 2021-05-19

## 2021-05-26 ENCOUNTER — TELEPHONE (OUTPATIENT)
Dept: OTHER | Facility: OTHER | Age: 56
End: 2021-05-26

## 2021-05-26 NOTE — TELEPHONE ENCOUNTER
Patient is inquiring if his insurance is going to be covering his upcoming appointment  Patient states that he was supposed to receive a call from the office and hasn't heard back yet  Please contact patient

## 2021-05-26 NOTE — TELEPHONE ENCOUNTER
Explained to patient that prior injection did not require an auth and closer to his procedure date  I will submit to request for auth and If I have any issues I will call him

## 2021-06-15 ENCOUNTER — TELEPHONE (OUTPATIENT)
Dept: PAIN MEDICINE | Facility: CLINIC | Age: 56
End: 2021-06-15

## 2021-06-15 ENCOUNTER — HOSPITAL ENCOUNTER (OUTPATIENT)
Dept: RADIOLOGY | Facility: CLINIC | Age: 56
Discharge: HOME | End: 2021-06-15
Attending: ANESTHESIOLOGY | Admitting: ANESTHESIOLOGY
Payer: COMMERCIAL

## 2021-06-15 VITALS
HEART RATE: 74 BPM | TEMPERATURE: 97.9 F | RESPIRATION RATE: 20 BRPM | SYSTOLIC BLOOD PRESSURE: 126 MMHG | DIASTOLIC BLOOD PRESSURE: 73 MMHG | OXYGEN SATURATION: 97 %

## 2021-06-15 DIAGNOSIS — M47.816 LUMBAR SPONDYLOSIS: ICD-10-CM

## 2021-06-15 PROCEDURE — 64635 DESTROY LUMB/SAC FACET JNT: CPT | Performed by: ANESTHESIOLOGY

## 2021-06-15 PROCEDURE — 64636 DESTROY L/S FACET JNT ADDL: CPT | Performed by: ANESTHESIOLOGY

## 2021-06-15 RX ORDER — BUPIVACAINE HYDROCHLORIDE 5 MG/ML
30 INJECTION, SOLUTION EPIDURAL; INTRACAUDAL ONCE
Status: COMPLETED | OUTPATIENT
Start: 2021-06-15 | End: 2021-06-15

## 2021-06-15 RX ORDER — LIDOCAINE HYDROCHLORIDE 10 MG/ML
10 INJECTION, SOLUTION EPIDURAL; INFILTRATION; INTRACAUDAL; PERINEURAL ONCE
Status: COMPLETED | OUTPATIENT
Start: 2021-06-15 | End: 2021-06-15

## 2021-06-15 RX ADMIN — LIDOCAINE HYDROCHLORIDE 6 ML: 10 INJECTION, SOLUTION EPIDURAL; INFILTRATION; INTRACAUDAL; PERINEURAL at 15:30

## 2021-06-15 RX ADMIN — LIDOCAINE HYDROCHLORIDE 3 ML: 20 INJECTION, SOLUTION EPIDURAL; INFILTRATION; INTRACAUDAL; PERINEURAL at 15:45

## 2021-06-15 RX ADMIN — BUPIVACAINE HYDROCHLORIDE 3 ML: 5 INJECTION, SOLUTION EPIDURAL; INTRACAUDAL at 15:48

## 2021-06-15 NOTE — DISCHARGE INSTRUCTIONS

## 2021-06-15 NOTE — DISCHARGE INSTR - LAB

## 2021-06-15 NOTE — H&P
History of Present Illness: The patient is a 63 y o  male who presents with complaints of   Low back pain pain      Patient Active Problem List   Diagnosis    Type 2 diabetes mellitus without complication, with [facility]-term current use of [facility]    Essential hypertension    Mixed hyperlipidemia    Adjustment disorder with withdrawal    Leukocytosis    Tobacco use    EILEEN (obstructive sleep apnea)    PIERRE (generalized anxiety disorder)    GERD (gastroesophageal reflux disease)    Colon polyps    Hip pain    Radiculopathy, lumbar region    DDD (degenerative disc disease), lumbar    Lumbar facet arthropathy    Lumbar radiculitis    Chronic pain syndrome    Lumbar spondylosis       Past Medical History:   Diagnosis Date    Diabetes mellitus ([address] )     GERD (gastroesophageal reflux disease)     Hyperlipidemia     Hypertension        Past Surgical History:   Procedure Laterality Date    ASD REPAIR      COLONOSCOPY  01/25/2019    Due 1/2024    HERNIA REPAIR      rpr umbil johny reduc >5 yr    PSA,SCREENING (HISTORICAL)  01/28/2019    WISDOM TOOTH EXTRACTION           Current Outpatient Medications:     aspirin (ECOTRIN LOW STRENGTH) 81 mg EC tablet, Take 81 mg by mouth daily, Disp: , Rfl:     citalopram (CeleXA) 10 mg tablet, Take 1 tablet (10 mg total) by mouth daily, Disp: 90 tablet, Rfl: 0    fenofibrate (TRICOR) 145 mg tablet, Take 145 mg by mouth daily, Disp: , Rfl:     gabapentin (NEURONTIN) 300 mg capsule, Take 1 capsule (300 mg total) by mouth 3 (three) times a day, Disp: 90 capsule, Rfl: 2    insulin glargine (Lantus SoloStar) 100 units/mL injection pen, Inject 30 units twice a day  E11 65, Disp: , Rfl:     irbesartan (AVAPRO) 300 mg tablet, Take 300 mg by mouth daily, Disp: , Rfl:     lansoprazole (PREVACID) 15 mg capsule, Take 15 mg by mouth daily, Disp: , Rfl:     liraglutide (Victoza) injection, Inject 1 8 mg under the skin daily, Disp: , Rfl:     meloxicam (MOBIC) 15 mg tablet, Take 1 tablet (15 mg total) by mouth daily, Disp: 30 tablet, Rfl: 2    metFORMIN (GLUCOPHAGE) 1000 MG tablet, Take 1,000 mg by mouth 2 (two) times a day, Disp: , Rfl:     rosuvastatin (CRESTOR) 20 MG tablet, Take 1 tablet (20 mg total) by mouth daily, Disp: 90 tablet, Rfl: 2    Allergies   Allergen Reactions    Dust Mite Extract     Molds & Smuts     Pollen Extract      Maple trees    Strawberry Extract - Food Allergy Other (See Comments)       Physical Exam:   Vitals:    06/15/21 1513   BP: 119/70   Pulse: 81   Resp: 18   Temp: 97 9 °F (36 6 °C)   SpO2: 96%     General: Awake, Alert, Oriented x 3, Mood and affect appropriate  Respiratory: Respirations even and unlabored  Cardiovascular: Peripheral pulses intact; no edema  Musculoskeletal Exam:  Bilateral lumbar paraspinals tender to palpation  ASA Score: 3    Patient/Chart Verification  Patient ID Verified: Verbal  ID Band Applied: No  Consents Confirmed: Procedural, To be obtained in the Pre-Procedure area  H&P( within 30 days) Verified: To be obtained in the Pre-Procedure area  Interval H&P(within 24 hr) Complete (required for Outpatients and Surgery Admit only): To be obtained in the Pre-Procedure area  Allergies Reviewed: Yes  Anticoag/NSAID held?: NA  Currently on antibiotics?: No    Assessment:   1   Lumbar spondylosis        Plan: LEFT L3,L4,L5 RFA

## 2021-06-15 NOTE — TELEPHONE ENCOUNTER
Pt s/p Left L3-5  RFA 6/15 with JW    Please call pt 6/16    Pt for Right L3-5 RFA 6/29 1520 arrival

## 2021-06-16 NOTE — TELEPHONE ENCOUNTER
S/w the patient and he stated he is a little sore but feeling ok  The band aids are off and the area is C/D/I, denies fever  Reviewed the postop instructions and he appreciated the call

## 2021-06-29 ENCOUNTER — TELEPHONE (OUTPATIENT)
Dept: PAIN MEDICINE | Facility: CLINIC | Age: 56
End: 2021-06-29

## 2021-06-29 ENCOUNTER — HOSPITAL ENCOUNTER (OUTPATIENT)
Dept: RADIOLOGY | Facility: CLINIC | Age: 56
Discharge: HOME | End: 2021-06-29
Attending: ANESTHESIOLOGY | Admitting: ANESTHESIOLOGY
Payer: COMMERCIAL

## 2021-06-29 VITALS
TEMPERATURE: 98.1 F | HEART RATE: 81 BPM | SYSTOLIC BLOOD PRESSURE: 131 MMHG | RESPIRATION RATE: 20 BRPM | OXYGEN SATURATION: 95 % | DIASTOLIC BLOOD PRESSURE: 83 MMHG

## 2021-06-29 DIAGNOSIS — M47.816 LUMBAR SPONDYLOSIS: ICD-10-CM

## 2021-06-29 PROCEDURE — 64636 DESTROY L/S FACET JNT ADDL: CPT | Performed by: ANESTHESIOLOGY

## 2021-06-29 PROCEDURE — 64635 DESTROY LUMB/SAC FACET JNT: CPT | Performed by: ANESTHESIOLOGY

## 2021-06-29 RX ORDER — BUPIVACAINE HYDROCHLORIDE 5 MG/ML
30 INJECTION, SOLUTION EPIDURAL; INTRACAUDAL ONCE
Status: COMPLETED | OUTPATIENT
Start: 2021-06-29 | End: 2021-06-29

## 2021-06-29 RX ORDER — LIDOCAINE HYDROCHLORIDE 10 MG/ML
10 INJECTION, SOLUTION EPIDURAL; INFILTRATION; INTRACAUDAL; PERINEURAL ONCE
Status: COMPLETED | OUTPATIENT
Start: 2021-06-29 | End: 2021-06-29

## 2021-06-29 RX ADMIN — LIDOCAINE HYDROCHLORIDE 10 ML: 10 INJECTION, SOLUTION EPIDURAL; INFILTRATION; INTRACAUDAL; PERINEURAL at 15:26

## 2021-06-29 RX ADMIN — LIDOCAINE HYDROCHLORIDE 3 ML: 20 INJECTION, SOLUTION EPIDURAL; INFILTRATION; INTRACAUDAL; PERINEURAL at 15:31

## 2021-06-29 RX ADMIN — BUPIVACAINE HYDROCHLORIDE 5 ML: 5 INJECTION, SOLUTION EPIDURAL; INTRACAUDAL at 15:39

## 2021-06-29 NOTE — TELEPHONE ENCOUNTER
Pt s/p Right L3,L4,L5 RFA 6/29 with BRYNN      Pt has 6 week f/u scheduled  with [facility] 8/11 to arrive at 315pm

## 2021-06-29 NOTE — DISCHARGE INSTRUCTIONS

## 2021-06-29 NOTE — H&P
History of Present Illness: The patient is a 63 y o  male who presents with complaints of  Low back pain      Patient Active Problem List   Diagnosis    Type 2 diabetes mellitus without complication, with long-term current use of insulin [facility]    Essential hypertension    Mixed hyperlipidemia    Adjustment disorder with withdrawal    Leukocytosis    Tobacco use    EILEEN (obstructive sleep apnea)    PIERRE (generalized anxiety disorder)    GERD (gastroesophageal reflux disease)    Colon polyps    Hip pain    Radiculopathy, lumbar region    DDD (degenerative disc disease), lumbar    Lumbar facet arthropathy    Lumbar radiculitis    Chronic pain syndrome    Lumbar spondylosis       Past Medical History:   Diagnosis Date    Diabetes mellitus ([address] )     GERD (gastroesophageal reflux disease)     Hyperlipidemia     Hypertension        Past Surgical History:   Procedure Laterality Date    ASD REPAIR      COLONOSCOPY  01/25/2019    Due 1/2024    HERNIA REPAIR      rpr umbil johny reduc >5 yr    PSA,SCREENING (HISTORICAL)  01/28/2019    WISDOM TOOTH EXTRACTION           Current Outpatient Medications:     aspirin (ECOTRIN LOW STRENGTH) 81 mg EC tablet, Take 81 mg by mouth daily, Disp: , Rfl:     citalopram (CeleXA) 10 mg tablet, Take 1 tablet (10 mg total) by mouth daily, Disp: 90 tablet, Rfl: 0    fenofibrate (TRICOR) 145 mg tablet, Take 145 mg by mouth daily, Disp: , Rfl:     gabapentin (NEURONTIN) 300 mg capsule, Take 1 capsule (300 mg total) by mouth 3 (three) times a day, Disp: 90 capsule, Rfl: 2    insulin glargine (Lantus SoloStar) 100 units/mL injection pen, Inject 30 units twice a day  E11 65, Disp: , Rfl:     irbesartan (AVAPRO) 300 mg tablet, Take 300 mg by mouth daily, Disp: , Rfl:     lansoprazole (PREVACID) 15 mg capsule, Take 15 mg by mouth daily, Disp: , Rfl:     liraglutide (Victoza) injection, Inject 1 8 mg under the skin daily, Disp: , Rfl:     meloxicam (MOBIC) 15 mg tablet, Take 1 tablet (15 mg total) by mouth daily, Disp: 30 tablet, Rfl: 2    metFORMIN (GLUCOPHAGE) 1000 MG tablet, Take 1,000 mg by mouth 2 (two) times a day, Disp: , Rfl:     rosuvastatin (CRESTOR) 20 MG tablet, Take 1 tablet (20 mg total) by mouth daily, Disp: 90 tablet, Rfl: 2    Current Facility-Administered Medications:     bupivacaine (PF) (MARCAINE) 0 5 % injection 30 mL, 30 mL, Injection, Once, Kevin Almeida, DO    lidocaine (PF) (XYLOCAINE-MPF) 1 % injection 10 mL, 10 mL, Other, Once, Kevin Almeida, DO    lidocaine (PF) (XYLOCAINE-MPF) 2 % injection 4 mL, 4 mL, Other, Once, Kevin Almeida, DO    Allergies   Allergen Reactions    Dust Mite Extract     Molds & Smuts     Pollen Extract      Maple trees    Strawberry Extract - Food Allergy Other (See Comments)       Physical Exam:   Vitals:    06/29/21 1512   BP: 117/74   Pulse: 88   Resp: 20   Temp: 98 1 °F (36 7 °C)   SpO2: 94%     General: Awake, Alert, Oriented x 3, Mood and affect appropriate  Respiratory: Respirations even and unlabored  Cardiovascular: Peripheral pulses intact; no edema  Musculoskeletal Exam:   Right lumbar paraspinals tender to palpation  ASA Score: 3    Patient/Chart Verification  Patient ID Verified: Verbal  ID Band Applied: No  Consents Confirmed: Procedural, To be obtained in the Pre-Procedure area  H&P( within 30 days) Verified: To be obtained in the Pre-Procedure area  Allergies Reviewed: Yes  Anticoag/NSAID held?: NA  Currently on antibiotics?: No    Assessment:   1   Lumbar spondylosis        Plan: RIGHT L3,L4,L5 RFA

## 2021-06-30 NOTE — TELEPHONE ENCOUNTER
RN attempted to call pt  The mailbox is full and cannot accept messages at this time  Will call again

## 2021-08-11 ENCOUNTER — OFFICE VISIT (OUTPATIENT)
Dept: PAIN MEDICINE | Facility: CLINIC | Age: 56
End: 2021-08-11
Payer: COMMERCIAL

## 2021-08-11 VITALS
WEIGHT: 220 LBS | HEART RATE: 83 BPM | DIASTOLIC BLOOD PRESSURE: 85 MMHG | SYSTOLIC BLOOD PRESSURE: 146 MMHG | HEIGHT: 71 IN | BODY MASS INDEX: 30.8 KG/M2

## 2021-08-11 DIAGNOSIS — M47.816 LUMBAR FACET ARTHROPATHY: Primary | ICD-10-CM

## 2021-08-11 DIAGNOSIS — G89.4 CHRONIC PAIN SYNDROME: ICD-10-CM

## 2021-08-11 DIAGNOSIS — M47.816 LUMBAR SPONDYLOSIS: ICD-10-CM

## 2021-08-11 DIAGNOSIS — M51.36 DDD (DEGENERATIVE DISC DISEASE), LUMBAR: ICD-10-CM

## 2021-08-11 PROCEDURE — 99213 OFFICE O/P EST LOW 20 MIN: CPT | Performed by: NURSE PRACTITIONER

## 2021-08-11 NOTE — PROGRESS NOTES
Assessment:  1  Lumbar facet arthropathy    2  DDD (degenerative disc disease), lumbar    3  Lumbar spondylosis    4  Chronic pain syndrome        Plan:  1  We can repeat bilateral L3-5 RFA p r n  2  Patient is no longer taking gabapentin or meloxicam  3  Patient will continue with his home exercise program  4  Patient will follow up on a p r n  basis at this time     M*Teep[employer] software was used to dictate this note  It may contain errors with dictating incorrect words or incorrect spelling  Please contact the provider directly with any questions  History of Present Illness: The patient is a 61 y o  male a history of diabetes last seen on 4/6/21 who presents for a follow up office visit in regards to chronic axial low back pain that is nonradiating into the lower extremities secondary to  Lumbar degenerative disc disease, lumbar spondylosis, lumbar stenosis and chronic pain syndrome  The patient denies bowel or bladder incontinence or saddle anesthesia  Patient is status post L3-5 RFA completed in June 2021 with 100% improvement of his back pain from this procedure  He rates his pain as 0/10 on the numeric pain rating scale  He states he is no longer taking gabapentin or meloxicam and offers no new complaints today    I have personally reviewed and/or updated the patient's past medical history, past surgical history, family history, social history, current medications, allergies, and vital signs today  Review of Systems:    Review of Systems   Respiratory: Negative for shortness of breath  Cardiovascular: Negative for chest pain  Gastrointestinal: Negative for constipation, diarrhea, nausea and vomiting  Musculoskeletal: Negative for arthralgias, gait problem, joint swelling and myalgias  Skin: Negative for rash  Neurological: Negative for dizziness, seizures and weakness  All other systems reviewed and are negative          Past Medical History:   Diagnosis Date    [facility]     GERD (gastroesophageal reflux disease)     Hyperlipidemia     Hypertension        Past Surgical History:   Procedure Laterality Date    ASD REPAIR      COLONOSCOPY  01/25/2019    Due 1/2024    HERNIA REPAIR      rpr umbil johny reduc >5 yr    PSA,SCREENING (HISTORICAL)  01/28/2019    WISDOM TOOTH EXTRACTION         Family History   Problem Relation Age of Onset    Diabetes Mother     Hypertension Mother     Tuberculosis Father     Diabetes Maternal Grandfather        Social History     Occupational History    Not on file   Tobacco Use    Smoking status: Current Every Day Smoker     Packs/day: 0 50     Types: Cigarettes     Start date: 1978    Smokeless tobacco: Current User   Vaping Use    Vaping Use: Never used   Substance and Sexual Activity    Alcohol use:  Yes    Drug use: Never    Sexual activity: Not on file         Current Outpatient Medications:     citalopram (CeleXA) 10 mg tablet, Take 1 tablet (10 mg total) by mouth daily, Disp: 90 tablet, Rfl: 0    fenofibrate (TRICOR) 145 mg tablet, Take 145 mg by mouth daily, Disp: , Rfl:     metFORMIN (GLUCOPHAGE) 1000 MG tablet, Take 1,000 mg by mouth 2 (two) times a day, Disp: , Rfl:     rosuvastatin (CRESTOR) 20 MG tablet, Take 1 tablet (20 mg total) by mouth daily, Disp: 90 tablet, Rfl: 2    aspirin (ECOTRIN LOW STRENGTH) 81 mg EC tablet, Take 81 mg by mouth daily, Disp: , Rfl:     gabapentin (NEURONTIN) 300 mg capsule, Take 1 capsule (300 mg total) by mouth 3 (three) times a day (Patient not taking: Reported on 8/11/2021), Disp: 90 capsule, Rfl: 2    insulin glargine (Lantus SoloStar) 100 units/mL injection pen, Inject 30 units twice a day  E11 65, Disp: , Rfl:     irbesartan (AVAPRO) 300 mg tablet, Take 300 mg by mouth daily, Disp: , Rfl:     lansoprazole (PREVACID) 15 mg capsule, Take 15 mg by mouth daily, Disp: , Rfl:     liraglutide (Victoza) injection, Inject 1 8 mg under the skin daily, Disp: , Rfl:     meloxicam (MOBIC) 15 mg tablet, Take 1 tablet (15 mg total) by mouth daily (Patient not taking: Reported on 8/11/2021), Disp: 30 tablet, Rfl: 2    Allergies   Allergen Reactions    Dust Mite Extract     Molds & Smuts     Pollen Extract      Maple trees    Strawberry Extract - Food Allergy Other (See Comments)       Physical Exam:    /85   Pulse 83   Ht 5' 11" (1 803 m)   Wt 99 8 kg (220 lb)   BMI 30 68 kg/m²     Constitutional:normal, well developed, well nourished, alert, in no distress and non-toxic and no overt pain behavior  Eyes:anicteric  HEENT:grossly intact  Neck:supple, symmetric, trachea midline and no masses   Pulmonary:even and unlabored  Cardiovascular:No edema or pitting edema present  Skin:Normal without rashes or lesions and well hydrated  Psychiatric:Mood and affect appropriate  Neurologic:Cranial Nerves II-XII grossly intact  Musculoskeletal:normal gait      Imaging  No orders to display         No orders of the defined types were placed in this encounter

## 2021-08-14 DIAGNOSIS — F41.9 ANXIETY: ICD-10-CM

## 2021-08-14 RX ORDER — CITALOPRAM 10 MG/1
TABLET ORAL
Qty: 90 TABLET | Refills: 0 | Status: SHIPPED | OUTPATIENT
Start: 2021-08-14 | End: 2021-11-08

## 2021-09-04 DIAGNOSIS — E78.5 DYSLIPIDEMIA: ICD-10-CM

## 2021-09-07 RX ORDER — ROSUVASTATIN CALCIUM 20 MG/1
TABLET, COATED ORAL
Qty: 90 TABLET | Refills: 2 | Status: SHIPPED | OUTPATIENT
Start: 2021-09-07 | End: 2022-03-14

## 2021-09-21 ENCOUNTER — OFFICE VISIT (OUTPATIENT)
Dept: FAMILY MEDICINE CLINIC | Facility: CLINIC | Age: 56
End: 2021-09-21
Payer: COMMERCIAL

## 2021-09-21 VITALS
DIASTOLIC BLOOD PRESSURE: 70 MMHG | HEART RATE: 83 BPM | BODY MASS INDEX: 31.78 KG/M2 | TEMPERATURE: 97 F | HEIGHT: 71 IN | SYSTOLIC BLOOD PRESSURE: 122 MMHG | OXYGEN SATURATION: 97 % | WEIGHT: 227 LBS

## 2021-09-21 DIAGNOSIS — R05.3 CHRONIC COUGH: ICD-10-CM

## 2021-09-21 DIAGNOSIS — I10 ESSENTIAL HYPERTENSION: Primary | ICD-10-CM

## 2021-09-21 DIAGNOSIS — Z23 NEEDS FLU SHOT: ICD-10-CM

## 2021-09-21 DIAGNOSIS — Z79.4 TYPE 2 DIABETES MELLITUS WITHOUT COMPLICATION, WITH LONG-TERM CURRENT USE OF INSULIN (HCC): ICD-10-CM

## 2021-09-21 DIAGNOSIS — F41.1 GAD (GENERALIZED ANXIETY DISORDER): ICD-10-CM

## 2021-09-21 DIAGNOSIS — K21.9 GASTROESOPHAGEAL REFLUX DISEASE, UNSPECIFIED WHETHER ESOPHAGITIS PRESENT: ICD-10-CM

## 2021-09-21 DIAGNOSIS — Z72.0 TOBACCO USE: ICD-10-CM

## 2021-09-21 DIAGNOSIS — E11.9 TYPE 2 DIABETES MELLITUS WITHOUT COMPLICATION, WITH LONG-TERM CURRENT USE OF INSULIN (HCC): ICD-10-CM

## 2021-09-21 DIAGNOSIS — E78.2 MIXED HYPERLIPIDEMIA: ICD-10-CM

## 2021-09-21 PROCEDURE — 90471 IMMUNIZATION ADMIN: CPT | Performed by: FAMILY MEDICINE

## 2021-09-21 PROCEDURE — 99214 OFFICE O/P EST MOD 30 MIN: CPT | Performed by: FAMILY MEDICINE

## 2021-09-21 PROCEDURE — 90682 RIV4 VACC RECOMBINANT DNA IM: CPT | Performed by: FAMILY MEDICINE

## 2021-09-21 NOTE — ASSESSMENT & PLAN NOTE
LDL 80, HDL 31, and TGs 179 in Sept 2021  Continue current meds  Advised pt to follow a low cholesterol diet and to exercise on a regular basis

## 2021-09-21 NOTE — PROGRESS NOTES
Depression Screening and Follow-up Plan:   Patient was screened for depression during today's encounter  They screened negative with a PHQ-2 score of 0  Assessment/Plan:         Problem List Items Addressed This Visit        Digestive    GERD (gastroesophageal reflux disease)     No recent symptoms  Continue prevacid 15 mg qd and GERD diet  Endocrine    Type 2 diabetes mellitus without complication, with long-term current use of insulin (HCC)     A1C up to 8 1 in Sept 2021  Continue metformin 1000 mg bid, lantus 45 U bid, and Victoza 1 8 mg qd  Foot exam done in Jan 2021  Pt to sched eye exam      Lab Results   Component Value Date    HGBA1C 8 1 (H) 09/04/2021               Cardiovascular and Mediastinum    Essential hypertension - Primary     BP good  Cont irbesartan 300 mg qd  Pt advised to continue low Na diet and to exercise on a regular basis  Other    Tobacco use     Pt still smokes 1/2 PPD  Not ready to quit  Mixed hyperlipidemia     LDL 80, HDL 31, and TGs 179 in Sept 2021  Continue current meds  Advised pt to follow a low cholesterol diet and to exercise on a regular basis  PIERRE (generalized anxiety disorder)     Stress level has been ok  Continue citalopram 10 mg qd  Chronic cough     Pt has had it for months  Will check CXR  Lungs CTA today  Relevant Orders    XR chest pa & lateral      Other Visit Diagnoses     Needs flu shot        Relevant Orders    influenza vaccine, quadrivalent, recombinant, PF, 0 5 mL, for patients 18 yr+ (FLU[facility])            Subjective:      Patient ID: Phyllis Speaker is a 61 y o  male  Pt here for f/u HTN, HL, DM, GERD, PIERRE  Pt doing well  No cp/sob  No headaches  No abd pain  BP has been good  Sugars a little higher and A1C was 8 1  Saw Dr Yazan Yan last week  No recent GERD  Stress level up at times  Had COVID vaccines  Wants flu shot today  Had to reschedule eye exam  Has chronic cough  Smokes 1/2 PPD         The following portions of the patient's history were reviewed and updated as appropriate:   Past Medical History:  He has a past medical history of Diabetes mellitus ([address] ), GERD (gastroesophageal reflux disease), Hyperlipidemia, and Hypertension  ,  _______________________________________________________________________  Medical Problems:  does not have any pertinent problems on file ,  _______________________________________________________________________  Past Surgical History:   has a past surgical history that includes Colonoscopy (01/25/2019); ASD repair; Hernia repair; [place] tooth extraction; and PSA,Screening (Historical) (01/28/2019)  ,  _______________________________________________________________________  Family History:  family history includes Diabetes in his maternal grandfather and mother; Hypertension in his mother; Tuberculosis in his father ,  _______________________________________________________________________  Social History:   reports that he has been smoking cigarettes  He started smoking about 43 years ago  He has been smoking about 0 50 packs per day  He uses smokeless tobacco  He reports current alcohol use  He reports that he does not use drugs  ,  _______________________________________________________________________  Allergies:  is allergic to dust mite extract, molds & smuts, pollen extract, and strawberry extract - food allergy     _______________________________________________________________________  Current Outpatient Medications   Medication Sig Dispense Refill    aspirin (ECOTRIN LOW STRENGTH) 81 mg EC tablet Take 81 mg by mouth daily      citalopram (CeleXA) 10 mg tablet TAKE ONE TABLET BY MOUTH EVERY DAY 90 tablet 0    fenofibrate (TRICOR) 145 mg tablet Take 145 mg by mouth daily      insulin glargine (Lantus SoloStar) 100 units/mL injection pen Inject 30 units twice a day  E11 65      irbesartan (AVAPRO) 300 mg tablet Take 300 mg by mouth daily      lansoprazole (PREVACID) 15 mg capsule Take 15 mg by mouth daily      liraglutide (Victoza) injection Inject 1 8 mg under the skin daily      metFORMIN (GLUCOPHAGE) 1000 MG tablet Take 1,000 mg by mouth 2 (two) times a day      rosuvastatin (CRESTOR) 20 MG tablet TAKE ONE TABLET BY MOUTH EVERY DAY 90 tablet 2    gabapentin (NEURONTIN) 300 mg capsule Take 1 capsule (300 mg total) by mouth 3 (three) times a day (Patient not taking: Reported on 8/11/2021) 90 capsule 2    meloxicam (MOBIC) 15 mg tablet Take 1 tablet (15 mg total) by mouth daily (Patient not taking: Reported on 8/11/2021) 30 tablet 2     No current facility-administered medications for this visit      _______________________________________________________________________  Review of Systems   Constitutional: Negative for activity change, appetite change, fatigue and unexpected weight change  Eyes: Negative for visual disturbance  Respiratory: Positive for cough  Negative for chest tightness and shortness of breath  Cardiovascular: Negative for chest pain and palpitations  Gastrointestinal: Negative for abdominal pain, constipation, diarrhea, nausea and vomiting  Endocrine: Negative for polydipsia, polyphagia and polyuria  Genitourinary: Negative for frequency  Musculoskeletal: Negative for arthralgias  Skin: Negative for rash and wound  Neurological: Negative for dizziness, tremors, light-headedness, numbness and headaches  Psychiatric/Behavioral: Negative for agitation, decreased concentration, dysphoric mood, self-injury, sleep disturbance and suicidal ideas  The patient is nervous/anxious  Objective:  Vitals:    09/21/21 1724   BP: 122/70   BP Location: Left arm   Patient Position: Sitting   Cuff Size: Standard   Pulse: 83   Temp: (!) 97 °F (36 1 °C)   TempSrc: Temporal   SpO2: 97%   Weight: 103 kg (227 lb)   Height: 5' 11" (1 803 m)     Body mass index is 31 66 kg/m²  Physical Exam  Vitals and nursing note reviewed     Constitutional: Appearance: Normal appearance  He is well-developed and normal weight  Neck:      Thyroid: No thyromegaly  Cardiovascular:      Rate and Rhythm: Normal rate and regular rhythm  Heart sounds: Normal heart sounds  No murmur heard  Pulmonary:      Effort: Pulmonary effort is normal  No respiratory distress  Breath sounds: Normal breath sounds  No wheezing  Abdominal:      General: Abdomen is flat  Palpations: Abdomen is soft  Tenderness: There is no abdominal tenderness  Musculoskeletal:      Cervical back: Normal range of motion and neck supple  Right lower leg: No edema  Left lower leg: No edema  Lymphadenopathy:      Cervical: No cervical adenopathy  Neurological:      Mental Status: He is alert and oriented to person, place, and time  Cranial Nerves: No cranial nerve deficit  Psychiatric:         Mood and Affect: Mood normal          Behavior: Behavior normal          Thought Content:  Thought content normal          Judgment: Judgment normal

## 2021-09-21 NOTE — ASSESSMENT & PLAN NOTE
A1C up to 8 1 in Sept 2021  Continue metformin 1000 mg bid, lantus 45 U bid, and Victoza 1 8 mg qd  Foot exam done in Jan 2021   Pt to sched eye exam      Lab Results   Component Value Date    HGBA1C 8 1 (H) 09/04/2021

## 2021-11-06 DIAGNOSIS — F41.9 ANXIETY: ICD-10-CM

## 2021-11-08 RX ORDER — CITALOPRAM 10 MG/1
TABLET ORAL
Qty: 90 TABLET | Refills: 0 | Status: SHIPPED | OUTPATIENT
Start: 2021-11-08 | End: 2021-12-20 | Stop reason: SDUPTHER

## 2021-12-20 ENCOUNTER — TELEPHONE (OUTPATIENT)
Dept: FAMILY MEDICINE CLINIC | Facility: CLINIC | Age: 56
End: 2021-12-20

## 2021-12-20 DIAGNOSIS — F41.9 ANXIETY: ICD-10-CM

## 2021-12-20 RX ORDER — CITALOPRAM 20 MG/1
20 TABLET ORAL DAILY
Qty: 90 TABLET | Refills: 1 | Status: SHIPPED | OUTPATIENT
Start: 2021-12-20 | End: 2022-06-21 | Stop reason: SDUPTHER

## 2022-01-18 ENCOUNTER — TELEPHONE (OUTPATIENT)
Dept: FAMILY MEDICINE CLINIC | Facility: CLINIC | Age: 57
End: 2022-01-18

## 2022-01-18 NOTE — TELEPHONE ENCOUNTER
Patient has a new insurance and he now needs a prior authorization for Irbesartan 300mg 2 tablet once a day

## 2022-01-25 ENCOUNTER — OFFICE VISIT (OUTPATIENT)
Dept: FAMILY MEDICINE CLINIC | Facility: CLINIC | Age: 57
End: 2022-01-25
Payer: COMMERCIAL

## 2022-01-25 VITALS
RESPIRATION RATE: 16 BRPM | HEART RATE: 88 BPM | HEIGHT: 71 IN | BODY MASS INDEX: 32.48 KG/M2 | TEMPERATURE: 97.3 F | SYSTOLIC BLOOD PRESSURE: 128 MMHG | OXYGEN SATURATION: 96 % | WEIGHT: 232 LBS | DIASTOLIC BLOOD PRESSURE: 80 MMHG

## 2022-01-25 DIAGNOSIS — I10 ESSENTIAL HYPERTENSION: Primary | ICD-10-CM

## 2022-01-25 DIAGNOSIS — K21.9 GASTROESOPHAGEAL REFLUX DISEASE, UNSPECIFIED WHETHER ESOPHAGITIS PRESENT: ICD-10-CM

## 2022-01-25 DIAGNOSIS — F41.1 GAD (GENERALIZED ANXIETY DISORDER): ICD-10-CM

## 2022-01-25 DIAGNOSIS — Z72.0 TOBACCO USE: ICD-10-CM

## 2022-01-25 DIAGNOSIS — E78.2 MIXED HYPERLIPIDEMIA: ICD-10-CM

## 2022-01-25 DIAGNOSIS — E11.9 TYPE 2 DIABETES MELLITUS WITHOUT COMPLICATION, WITH LONG-TERM CURRENT USE OF INSULIN (HCC): ICD-10-CM

## 2022-01-25 DIAGNOSIS — Z79.4 TYPE 2 DIABETES MELLITUS WITHOUT COMPLICATION, WITH LONG-TERM CURRENT USE OF INSULIN (HCC): ICD-10-CM

## 2022-01-25 PROCEDURE — 3074F SYST BP LT 130 MM HG: CPT | Performed by: FAMILY MEDICINE

## 2022-01-25 PROCEDURE — 3008F BODY MASS INDEX DOCD: CPT | Performed by: FAMILY MEDICINE

## 2022-01-25 PROCEDURE — 3079F DIAST BP 80-89 MM HG: CPT | Performed by: FAMILY MEDICINE

## 2022-01-25 PROCEDURE — 4004F PT TOBACCO SCREEN RCVD TLK: CPT | Performed by: FAMILY MEDICINE

## 2022-01-25 PROCEDURE — 99214 OFFICE O/P EST MOD 30 MIN: CPT | Performed by: FAMILY MEDICINE

## 2022-01-25 RX ORDER — OMEPRAZOLE 20 MG/1
20 CAPSULE, DELAYED RELEASE ORAL
Qty: 90 CAPSULE | Refills: 3 | Status: SHIPPED | OUTPATIENT
Start: 2022-01-25

## 2022-01-25 NOTE — ASSESSMENT & PLAN NOTE
A1C up to 8 1 in Sept 2021  Pt sees Dr Rubin Barreto in March 2022 for follow-up  Continue metformin 1000 mg bid, lantus 45 U bid, and Victoza 1 8 mg qd  Foot exam done today  Pt advised to schedule eye exam  Had flu shot and COVID booster       Lab Results   Component Value Date    HGBA1C 8 1 (H) 09/04/2021

## 2022-01-25 NOTE — PROGRESS NOTES
BMI Counseling: Body mass index is 32 36 kg/m²  The BMI is above normal  Nutrition recommendations include decreasing portion sizes, encouraging healthy choices of fruits and vegetables, consuming healthier snacks and moderation in carbohydrate intake  Exercise recommendations include exercising 3-5 times per week  No pharmacotherapy was ordered  Rationale for BMI follow-up plan is due to patient being overweight or obese  Assessment/Plan:         Problem List Items Addressed This Visit        Digestive    GERD (gastroesophageal reflux disease)     No recent symptoms  Continue GERD diet  Will change med to omeprazole due to insurance  Relevant Medications    omeprazole (PriLOSEC) 20 mg delayed release capsule       Endocrine    Type 2 diabetes mellitus without complication, with long-term current use of insulin ([facility])     A1C up to 8 1 in Sept 2021  Pt sees Dr Doroteo Mukherjee in March 2022 for follow-up  Continue metformin 1000 mg bid, lantus 45 U bid, and Victoza 1 8 mg qd  Foot exam done today  Pt advised to schedule eye exam  Had flu shot and COVID booster  Lab Results   Component Value Date    HGBA1C 8 1 (H) 09/04/2021               Cardiovascular and Mediastinum    Essential hypertension - Primary     BP has been good  Continue irbesartan 300 mg qd  Pt advised to continue low Na diet and to exercise on a regular basis  Other    Tobacco use     Pt still smokes 1/2 PPD  Not ready to quit  Mixed hyperlipidemia     LDL 80, HDL 31, and TGs 179 in Sept 2021  Continue current meds  Advised pt to follow a low cholesterol diet and to exercise on a regular basis  PIERRE (generalized anxiety disorder)     Stress level has been ok  Continue citalopram 10 mg qd  Subjective:      Patient ID: Leroy Palma is a 61 y o  male  Pt here for f/u HTN, HL, DM, GERD, PIERRE, Tob use  Pt doing ok  No cp/sob  No headaches  Checks sugars bid and up and down  BP good  No recent GERD  Stress level ok  Still smokes 1/2 PPD  Still hasn't seen Ophtho  Had COVID booster and flu shot  The following portions of the patient's history were reviewed and updated as appropriate:   Past Medical History:  He has a past medical history of Diabetes mellitus ([address] ), GERD (gastroesophageal reflux disease), Hyperlipidemia, and Hypertension  ,  _______________________________________________________________________  Medical Problems:  does not have any pertinent problems on file ,  _______________________________________________________________________  Past Surgical History:   has a past surgical history that includes Colonoscopy (01/25/2019); ASD repair; Hernia repair; [place] tooth extraction; and PSA,Screening (Historical) (01/28/2019)  ,  _______________________________________________________________________  Family History:  family history includes Diabetes in his maternal grandfather and mother; Hypertension in his mother; Tuberculosis in his father ,  _______________________________________________________________________  Social History:   reports that he has been smoking cigarettes  He started smoking about 44 years ago  He has been smoking about 0 50 packs per day  He uses smokeless tobacco  He reports current alcohol use  He reports that he does not use drugs  ,  _______________________________________________________________________  Allergies:  is allergic to dust mite extract, molds & smuts, pollen extract, and strawberry extract - food allergy     _______________________________________________________________________  Current Outpatient Medications   Medication Sig Dispense Refill    aspirin (ECOTRIN LOW STRENGTH) 81 mg EC tablet Take 81 mg by mouth daily      citalopram (CeleXA) 20 mg tablet Take 1 tablet (20 mg total) by mouth daily 90 tablet 1    fenofibrate (TRICOR) 145 mg tablet Take 145 mg by mouth daily      insulin glargine (Lantus SoloStar) 100 units/mL injection pen Inject 30 units twice a day  E11 65      irbesartan (AVAPRO) 300 mg tablet Take 300 mg by mouth daily      liraglutide (Victoza) injection Inject 1 8 mg under the skin daily      metFORMIN (GLUCOPHAGE) 1000 MG tablet Take 1,000 mg by mouth 2 (two) times a day      rosuvastatin (CRESTOR) 20 MG tablet TAKE ONE TABLET BY MOUTH EVERY DAY 90 tablet 2    omeprazole (PriLOSEC) 20 mg delayed release capsule Take 1 capsule (20 mg total) by mouth daily before breakfast 90 capsule 3     No current facility-administered medications for this visit      _______________________________________________________________________  Review of Systems   Constitutional: Negative for fatigue and unexpected weight change  Eyes: Negative for visual disturbance  Respiratory: Negative for chest tightness and shortness of breath  Cardiovascular: Negative for chest pain and palpitations  Gastrointestinal: Negative for abdominal pain, constipation, diarrhea, nausea and vomiting  Endocrine: Negative for polydipsia, polyphagia and polyuria  Genitourinary: Negative for frequency  Musculoskeletal: Negative for arthralgias  Skin: Negative for rash and wound  Neurological: Negative for numbness  Objective:  Vitals:    01/25/22 1625   BP: 128/80   BP Location: Left arm   Patient Position: Sitting   Cuff Size: Standard   Pulse: 88   Resp: 16   Temp: (!) 97 3 °F (36 3 °C)   TempSrc: Temporal   SpO2: 96%   Weight: 105 kg (232 lb)   Height: 5' 11" (1 803 m)     Body mass index is 32 36 kg/m²  Physical Exam  Vitals and nursing note reviewed  Constitutional:       Appearance: Normal appearance  He is well-developed and normal weight  Neck:      Thyroid: No thyromegaly  Cardiovascular:      Rate and Rhythm: Normal rate and regular rhythm  Pulses: no weak pulses     Heart sounds: Normal heart sounds  No murmur heard  Pulmonary:      Effort: Pulmonary effort is normal  No respiratory distress        Breath sounds: Normal breath sounds  No wheezing  Musculoskeletal:      Cervical back: Normal range of motion and neck supple  Right lower leg: No edema  Left lower leg: No edema  Feet:      Right foot:      Skin integrity: Dry skin present  No ulcer, skin breakdown, erythema, warmth or callus  Left foot:      Skin integrity: Callus and dry skin present  No ulcer, skin breakdown, erythema or warmth  Lymphadenopathy:      Cervical: No cervical adenopathy  Neurological:      Mental Status: He is alert and oriented to person, place, and time  Cranial Nerves: No cranial nerve deficit  Psychiatric:         Mood and Affect: Mood normal          Behavior: Behavior normal          Thought Content: Thought content normal          Judgment: Judgment normal        Patient's shoes and socks removed  Right Foot/Ankle   Right Foot Inspection  Skin Exam: skin normal, skin intact and dry skin  No warmth, no callus, no erythema, no maceration, no abnormal color, no pre-ulcer, no ulcer and no callus  Toe Exam: right toe deformity (5th toe amputation)  Sensory   Proprioception: intact      Vascular  Capillary refills: < 3 seconds          Left Foot/Ankle  Left Foot Inspection  Skin Exam: skin normal, skin intact, dry skin and callus  No warmth, no erythema, no maceration, normal color, no pre-ulcer and no ulcer       Sensory   Proprioception: intact      Vascular  Capillary refills: < 3 seconds        Assign Risk Category  Deformity present  No loss of protective sensation  No weak pulses  Risk: 1

## 2022-01-25 NOTE — ASSESSMENT & PLAN NOTE
BP has been good  Continue irbesartan 300 mg qd  Pt advised to continue low Na diet and to exercise on a regular basis

## 2022-03-11 DIAGNOSIS — E78.5 DYSLIPIDEMIA: ICD-10-CM

## 2022-03-14 RX ORDER — ROSUVASTATIN CALCIUM 20 MG/1
TABLET, COATED ORAL
Qty: 90 TABLET | Refills: 2 | Status: SHIPPED | OUTPATIENT
Start: 2022-03-14 | End: 2022-05-24 | Stop reason: SDUPTHER

## 2022-05-24 ENCOUNTER — OFFICE VISIT (OUTPATIENT)
Dept: FAMILY MEDICINE CLINIC | Facility: CLINIC | Age: 57
End: 2022-05-24
Payer: COMMERCIAL

## 2022-05-24 VITALS
RESPIRATION RATE: 16 BRPM | BODY MASS INDEX: 31.22 KG/M2 | WEIGHT: 223 LBS | TEMPERATURE: 97.7 F | OXYGEN SATURATION: 96 % | SYSTOLIC BLOOD PRESSURE: 118 MMHG | HEIGHT: 71 IN | HEART RATE: 86 BPM | DIASTOLIC BLOOD PRESSURE: 72 MMHG

## 2022-05-24 DIAGNOSIS — I10 ESSENTIAL HYPERTENSION: Primary | ICD-10-CM

## 2022-05-24 DIAGNOSIS — K21.9 GASTROESOPHAGEAL REFLUX DISEASE, UNSPECIFIED WHETHER ESOPHAGITIS PRESENT: ICD-10-CM

## 2022-05-24 DIAGNOSIS — E78.5 DYSLIPIDEMIA: ICD-10-CM

## 2022-05-24 DIAGNOSIS — E78.2 MIXED HYPERLIPIDEMIA: ICD-10-CM

## 2022-05-24 DIAGNOSIS — F41.1 GAD (GENERALIZED ANXIETY DISORDER): ICD-10-CM

## 2022-05-24 DIAGNOSIS — Z72.0 TOBACCO USE: ICD-10-CM

## 2022-05-24 DIAGNOSIS — E11.9 TYPE 2 DIABETES MELLITUS WITHOUT COMPLICATION, WITH LONG-TERM CURRENT USE OF INSULIN (HCC): ICD-10-CM

## 2022-05-24 DIAGNOSIS — Z79.4 TYPE 2 DIABETES MELLITUS WITHOUT COMPLICATION, WITH LONG-TERM CURRENT USE OF INSULIN (HCC): ICD-10-CM

## 2022-05-24 PROCEDURE — 3078F DIAST BP <80 MM HG: CPT | Performed by: FAMILY MEDICINE

## 2022-05-24 PROCEDURE — 99214 OFFICE O/P EST MOD 30 MIN: CPT | Performed by: FAMILY MEDICINE

## 2022-05-24 PROCEDURE — 4004F PT TOBACCO SCREEN RCVD TLK: CPT | Performed by: FAMILY MEDICINE

## 2022-05-24 PROCEDURE — 3074F SYST BP LT 130 MM HG: CPT | Performed by: FAMILY MEDICINE

## 2022-05-24 PROCEDURE — 3725F SCREEN DEPRESSION PERFORMED: CPT | Performed by: FAMILY MEDICINE

## 2022-05-24 PROCEDURE — 3008F BODY MASS INDEX DOCD: CPT | Performed by: FAMILY MEDICINE

## 2022-05-24 RX ORDER — ROSUVASTATIN CALCIUM 20 MG/1
20 TABLET, COATED ORAL DAILY
Qty: 90 TABLET | Refills: 2 | Status: SHIPPED | OUTPATIENT
Start: 2022-05-24

## 2022-05-24 RX ORDER — LANSOPRAZOLE 15 MG/1
15 CAPSULE, DELAYED RELEASE ORAL DAILY
COMMUNITY
Start: 2022-03-24

## 2022-05-24 RX ORDER — GLUCAGON 3 MG/1
POWDER NASAL
COMMUNITY
Start: 2022-03-16

## 2022-05-24 NOTE — ASSESSMENT & PLAN NOTE
BP good  Continue irbesartan 300 mg qd  Pt advised to continue low Na diet and to exercise on a regular basis

## 2022-05-24 NOTE — ASSESSMENT & PLAN NOTE
A1C 8 1 in March 2022  Pt saw Dr Adam Dee in March 2022 for follow-up  Sugars much better the past 2 months since pt gave up cookies and candy bars  Continue metformin 1000 mg bid, lantus 35 U bid, and Victoza 1 8 mg qd  Foot exam done in Jan 2022  Had eye exam in Feb 2022  Had flu shot and COVID booster       Lab Results   Component Value Date    HGBA1C 8 1 (H) 03/04/2022

## 2022-05-24 NOTE — PROGRESS NOTES
Assessment/Plan:         Problem List Items Addressed This Visit        Digestive    GERD (gastroesophageal reflux disease)     Pt has not had any recent symptoms  Continue lasoprazole 15mg qd and GERD diet  Relevant Medications    lansoprazole (PREVACID) 15 mg capsule       Endocrine    Type 2 diabetes mellitus without complication, with long-term current use of insulin (HCC)     A1C 8 1 in March 2022  Pt saw Dr Dontae Noe in March 2022 for follow-up  Sugars much better the past 2 months since pt gave up cookies and candy bars  Continue metformin 1000 mg bid, lantus 35 U bid, and Victoza 1 8 mg qd  Foot exam done in Jan 2022  Had eye exam in Feb 2022  Had flu shot and COVID booster  Lab Results   Component Value Date    HGBA1C 8 1 (H) 03/04/2022              Relevant Medications    Baqsimi Two Pack 3 MG/DOSE POWD       Cardiovascular and Mediastinum    Essential hypertension - Primary     BP good  Continue irbesartan 300 mg qd  Pt advised to continue low Na diet and to exercise on a regular basis  Other    Tobacco use     Pt still smokes 1/2 PPD  Not ready to quit  Mixed hyperlipidemia     LDL 80, HDL 31, and TGs 179 in Sept 2021  Continue current meds  Advised pt to follow a low cholesterol diet and to exercise on a regular basis  Relevant Medications    rosuvastatin (CRESTOR) 20 MG tablet    PIERRE (generalized anxiety disorder)     Stress level has been ok  Continue citalopram 10 mg qd  Other Visit Diagnoses     Dyslipidemia        Relevant Medications    rosuvastatin (CRESTOR) 20 MG tablet            Subjective:      Patient ID: Romelia España is a 61 y o  male  Pt here for f/u HTN, HL, DM, Tob use, GERD, PIERRE  Pt doing well  No cp/sob  No headaches  No abd pain  BP has been good  Sugars have been much better since pt stopped eating cookies and candy bars  Pt walks at work  Had eye exam in Feb 2022 by Dr Gissel Reyes  Still smokes 1/2 PPD  No recent GERD  Stress level ok  Lost 9 lbs since Jan 2022  The following portions of the patient's history were reviewed and updated as appropriate:   Past Medical History:  He has a past medical history of Diabetes mellitus ([address] ), GERD (gastroesophageal reflux disease), Hyperlipidemia, and Hypertension  ,  _______________________________________________________________________  Medical Problems:  does not have any pertinent problems on file ,  _______________________________________________________________________  Past Surgical History:   has a past surgical history that includes Colonoscopy (01/25/2019); ASD repair; Hernia repair; [place] tooth extraction; and PSA,Screening (Historical) (01/28/2019)  ,  _______________________________________________________________________  Family History:  family history includes Diabetes in his maternal grandfather and mother; Hypertension in his mother; Tuberculosis in his father ,  _______________________________________________________________________  Social History:   reports that he has been smoking cigarettes  He started smoking about 44 years ago  He has been smoking about 0 50 packs per day  He uses smokeless tobacco  He reports current alcohol use  He reports that he does not use drugs  ,  _______________________________________________________________________  Allergies:  is allergic to dust mite extract, molds & smuts, pollen extract, and strawberry extract - food allergy     _______________________________________________________________________  Current Outpatient Medications   Medication Sig Dispense Refill    Baqsimi Two Pack 3 MG/DOSE POWD SPRAY 3MG INTO ONE NOSTRIL AS NEEDED (FOR SEVERE HYPOGLYCEMIA)        citalopram (CeleXA) 20 mg tablet Take 1 tablet (20 mg total) by mouth daily 90 tablet 1    fenofibrate (TRICOR) 145 mg tablet Take 145 mg by mouth daily      insulin glargine (Lantus SoloStar) 100 units/mL injection pen Inject 35 Units under the skin every 12 (twelve) hours      irbesartan (AVAPRO) 300 mg tablet Take 300 mg by mouth daily      lansoprazole (PREVACID) 15 mg capsule Take 15 mg by mouth in the morning   liraglutide (Victoza) injection Inject 1 8 mg under the skin daily      metFORMIN (GLUCOPHAGE) 1000 MG tablet Take 1,000 mg by mouth 2 (two) times a day      rosuvastatin (CRESTOR) 20 MG tablet Take 1 tablet (20 mg total) by mouth in the morning  90 tablet 2    aspirin (ECOTRIN LOW STRENGTH) 81 mg EC tablet Take 81 mg by mouth daily (Patient not taking: Reported on 5/24/2022)      omeprazole (PriLOSEC) 20 mg delayed release capsule Take 1 capsule (20 mg total) by mouth daily before breakfast (Patient not taking: Reported on 5/24/2022) 90 capsule 3     No current facility-administered medications for this visit      _______________________________________________________________________  Review of Systems   Constitutional: Negative for fatigue and unexpected weight change  Eyes: Negative for visual disturbance  Respiratory: Negative for chest tightness and shortness of breath  Cardiovascular: Negative for chest pain and palpitations  Gastrointestinal: Negative for abdominal pain, constipation, diarrhea, nausea and vomiting  Endocrine: Negative for polydipsia, polyphagia and polyuria  Genitourinary: Negative for frequency  Musculoskeletal: Negative for arthralgias  Skin: Negative for rash and wound  Neurological: Negative for numbness  Objective:  Vitals:    05/24/22 1631   BP: 118/72   BP Location: Left arm   Patient Position: Sitting   Cuff Size: Standard   Pulse: 86   Resp: 16   Temp: 97 7 °F (36 5 °C)   TempSrc: Temporal   SpO2: 96%   Weight: 101 kg (223 lb)   Height: 5' 11" (1 803 m)     Body mass index is 31 1 kg/m²  Physical Exam  Vitals and nursing note reviewed  Constitutional:       Appearance: Normal appearance  He is well-developed and normal weight  Neck:      Thyroid: No thyromegaly     Cardiovascular: Rate and Rhythm: Normal rate and regular rhythm  Heart sounds: Normal heart sounds  No murmur heard  Pulmonary:      Effort: Pulmonary effort is normal  No respiratory distress  Breath sounds: Normal breath sounds  No wheezing  Musculoskeletal:      Cervical back: Normal range of motion and neck supple  Right lower leg: No edema  Left lower leg: No edema  Lymphadenopathy:      Cervical: No cervical adenopathy  Neurological:      Mental Status: He is alert and oriented to person, place, and time  Cranial Nerves: No cranial nerve deficit  Psychiatric:         Mood and Affect: Mood normal          Behavior: Behavior normal          Thought Content:  Thought content normal          Judgment: Judgment normal

## 2022-05-25 ENCOUNTER — TELEPHONE (OUTPATIENT)
Dept: ADMINISTRATIVE | Facility: OTHER | Age: 57
End: 2022-05-25

## 2022-05-25 NOTE — LETTER
Diabetic Eye Exam Form    Date Requested: 22  Patient: Kavita Verdugo  Patient : 1965   Referring Provider: Baylee Cardenas MD    DIABETIC Eye Exam Date _______________________________    Type of Exam MUST be documented for Diabetic Eye Exams  Please CHECK ONE  Retinal Exam       Dilated Retinal Exam       OCT       Optomap-Iris Exam      Fundus Photography     Left Eye - Please check Retinopathy AND Type or No Retinopathy      Exam did show retinopathy    Exam did not show retinopathy         Mild     Proliferative           Moderate    Severe            None         Right Eye - Please check Retinopathy AND Type or No Retinopathy     Exam did show retinopathy    Exam did not show retinopathy         Mild     Proliferative        Moderate    Severe        None       Comments __________________________________________________________    Practice Providing Exam ______________________________________________    Exam Performed By (print name) _______________________________________      Provider Signature ___________________________________________________    These reports are needed for  compliance  Please fax this completed form and a copy of the Diabetic Eye Exam report to our office located at [place] [address] as soon as possible via [phone] rc [place] Seen: Phone [phone]  We thank you for your assistance in treating our mutual patient

## 2022-05-25 NOTE — TELEPHONE ENCOUNTER
Upon review of the In Basket request and the patient's chart, initial outreach has been made via fax, please see Contacts section for details       Thank you  Erika [address] Dr [phone] Fax: [phone]

## 2022-05-25 NOTE — TELEPHONE ENCOUNTER
----- Message from Ric Garcia sent at 5/24/2022  4:36 PM EDT -----  Regarding: care gap request  05/24/22 4:36 PM    Hello, our patient attached above has had Diabetic Eye Exam completed/performed  Please assist in updating the patient chart by making an External outreach to dr Irlanda Dumont facility located in [place]  The date of service is within 2022 march/april      Thank you,  Siri Thapa  [facility]

## 2022-05-26 NOTE — TELEPHONE ENCOUNTER
Upon review of the In Basket request we have found as a result of outreach that patient did not have the requested item(s) completed  Any additional questions or concerns should be emailed to the Practice Liaisons via [email or web address] email, please do not reply via In Basket      Thank you  Erika Emmanuel

## 2022-06-21 DIAGNOSIS — F41.9 ANXIETY: ICD-10-CM

## 2022-06-21 RX ORDER — CITALOPRAM 20 MG/1
20 TABLET ORAL DAILY
Qty: 90 TABLET | Refills: 1 | Status: SHIPPED | OUTPATIENT
Start: 2022-06-21

## 2022-07-18 ENCOUNTER — TELEPHONE (OUTPATIENT)
Dept: FAMILY MEDICINE CLINIC | Facility: CLINIC | Age: 57
End: 2022-07-18

## 2022-07-18 DIAGNOSIS — M54.16 RADICULOPATHY, LUMBAR REGION: Primary | ICD-10-CM

## 2022-07-18 RX ORDER — GABAPENTIN 300 MG/1
300 CAPSULE ORAL DAILY
Qty: 30 CAPSULE | Refills: 0 | Status: SHIPPED | OUTPATIENT
Start: 2022-07-18 | End: 2022-08-15

## 2022-07-18 RX ORDER — MELOXICAM 15 MG/1
15 TABLET ORAL DAILY
Qty: 30 TABLET | Refills: 0 | Status: SHIPPED | OUTPATIENT
Start: 2022-07-18 | End: 2022-09-06

## 2022-08-14 DIAGNOSIS — M54.16 RADICULOPATHY, LUMBAR REGION: ICD-10-CM

## 2022-08-15 RX ORDER — GABAPENTIN 300 MG/1
CAPSULE ORAL
Qty: 30 CAPSULE | Refills: 0 | Status: SHIPPED | OUTPATIENT
Start: 2022-08-15 | End: 2022-09-06

## 2022-09-05 DIAGNOSIS — M54.16 RADICULOPATHY, LUMBAR REGION: ICD-10-CM

## 2022-09-06 RX ORDER — MELOXICAM 15 MG/1
TABLET ORAL
Qty: 30 TABLET | Refills: 0 | Status: SHIPPED | OUTPATIENT
Start: 2022-09-06 | End: 2022-10-10 | Stop reason: SDUPTHER

## 2022-09-06 RX ORDER — GABAPENTIN 300 MG/1
CAPSULE ORAL
Qty: 30 CAPSULE | Refills: 0 | Status: SHIPPED | OUTPATIENT
Start: 2022-09-06

## 2022-10-25 ENCOUNTER — OFFICE VISIT (OUTPATIENT)
Dept: FAMILY MEDICINE CLINIC | Facility: CLINIC | Age: 57
End: 2022-10-25
Payer: COMMERCIAL

## 2022-10-25 VITALS
WEIGHT: 224 LBS | DIASTOLIC BLOOD PRESSURE: 74 MMHG | BODY MASS INDEX: 31.36 KG/M2 | HEIGHT: 71 IN | HEART RATE: 80 BPM | OXYGEN SATURATION: 94 % | TEMPERATURE: 97.2 F | SYSTOLIC BLOOD PRESSURE: 120 MMHG | RESPIRATION RATE: 16 BRPM

## 2022-10-25 DIAGNOSIS — E78.2 MIXED HYPERLIPIDEMIA: ICD-10-CM

## 2022-10-25 DIAGNOSIS — Z23 ENCOUNTER FOR IMMUNIZATION: ICD-10-CM

## 2022-10-25 DIAGNOSIS — K21.9 GASTROESOPHAGEAL REFLUX DISEASE, UNSPECIFIED WHETHER ESOPHAGITIS PRESENT: ICD-10-CM

## 2022-10-25 DIAGNOSIS — F41.1 GAD (GENERALIZED ANXIETY DISORDER): ICD-10-CM

## 2022-10-25 DIAGNOSIS — Z12.5 PROSTATE CANCER SCREENING: ICD-10-CM

## 2022-10-25 DIAGNOSIS — I10 ESSENTIAL HYPERTENSION: Primary | ICD-10-CM

## 2022-10-25 DIAGNOSIS — Z79.4 TYPE 2 DIABETES MELLITUS WITHOUT COMPLICATION, WITH LONG-TERM CURRENT USE OF INSULIN (HCC): ICD-10-CM

## 2022-10-25 DIAGNOSIS — E11.9 TYPE 2 DIABETES MELLITUS WITHOUT COMPLICATION, WITH LONG-TERM CURRENT USE OF INSULIN (HCC): ICD-10-CM

## 2022-10-25 DIAGNOSIS — Z72.0 TOBACCO USE: ICD-10-CM

## 2022-10-25 PROCEDURE — 90682 RIV4 VACC RECOMBINANT DNA IM: CPT | Performed by: FAMILY MEDICINE

## 2022-10-25 PROCEDURE — 90471 IMMUNIZATION ADMIN: CPT | Performed by: FAMILY MEDICINE

## 2022-10-25 PROCEDURE — 99214 OFFICE O/P EST MOD 30 MIN: CPT | Performed by: FAMILY MEDICINE

## 2022-10-25 RX ORDER — TIRZEPATIDE 5 MG/.5ML
INJECTION, SOLUTION SUBCUTANEOUS
COMMUNITY
Start: 2022-10-11

## 2022-10-25 RX ORDER — IRBESARTAN 150 MG/1
300 TABLET ORAL DAILY
COMMUNITY
Start: 2022-09-10 | End: 2022-10-25

## 2022-10-25 NOTE — ASSESSMENT & PLAN NOTE
A1C 7 7 in Sept 2022  Pt sees Dr Nguyen Patient  Continue metformin 1000 mg bid and lantus 28 U bid  [place] [facility] [place] [facility] and at 5 mg dose now  Foot exam done in Jan 2022  Had eye exam in Feb 2022       Lab Results   Component Value Date    HGBA1C 8 1 (H) 03/04/2022

## 2022-10-25 NOTE — PROGRESS NOTES
Assessment/Plan:         Problem List Items Addressed This Visit        Digestive    GERD (gastroesophageal reflux disease)     No recent symptoms  Continue lasoprazole 15mg qd and GERD diet  Endocrine    Type 2 diabetes mellitus without complication, with long-term current use of insulin (HCC)     A1C 7 7 in Sept 2022  Pt sees Dr Abdullahi Redman  Continue metformin 1000 mg bid and lantus 28 U bid  [place] [facility] [place] [facility] [place] at 5 mg dose now  Foot exam done in Jan 2022  Had eye exam in Feb 2022  Lab Results   Component Value Date    HGBA1C 8 1 (H) 03/04/2022            Relevant Medications    Mounjaro 5 MG/0 5ML SOPN       Cardiovascular and Mediastinum    Essential hypertension - Primary     BP has been good  Continue irbesartan 300 mg qd  Pt advised to continue low Na diet and to exercise on a regular basis  Other    Tobacco use     Pt still smokes 1/2 PPD  Not ready to quit  Mixed hyperlipidemia     LDL 98 and LFTs normal in Sept 2022  Continue current meds  Advised pt to follow a low cholesterol diet and to exercise on a regular basis  PIERRE (generalized anxiety disorder)     Stress level ok  Continue citalopram 10 mg qd  Other Visit Diagnoses     Encounter for immunization        Relevant Orders    influenza vaccine, quadrivalent, recombinant, PF, 0 5 mL, for patients 18 yr+ (FLUBLOK)    Prostate cancer screening        Relevant Orders    PSA, Total Screen            Subjective:      Patient ID: Sigrid Walter is a 61 y o  male  Pt here for f/u HTN, HL, DM, GERD, PIERRE, Tob use  Doing well  No cp/sob  No BALL  Saw Dr Abdullahi Redman last mth and was [place] [facility] [place] [facility]  Sugars are better  BP has been good  No recent GERD  Stress level ok  Still smokes 1/2 PPD  Wants flu shot  Had eye exam this year by Dr Oneil So  No new c/o         The following portions of the patient's history were reviewed and updated as appropriate:   Past Medical History:  He has a past medical history of Allergic, Anxiety, Diabetes mellitus ([address] ), GERD (gastroesophageal reflux disease), Hyperlipidemia, and Hypertension  ,  _______________________________________________________________________  Medical Problems:  does not have any pertinent problems on file ,  _______________________________________________________________________  Past Surgical History:   has a past surgical history that includes Colonoscopy (01/25/2019); ASD repair; Hernia repair; [place] tooth extraction; and PSA,Screening (Historical) (01/28/2019)  ,  _______________________________________________________________________  Family History:  family history includes Alcohol abuse in his brother; COPD in his brother; Diabetes in his maternal grandfather and mother; Hypertension in his mother; Substance Abuse in his brother; Tuberculosis in his father ,  _______________________________________________________________________  Social History:   reports that he has been smoking cigarettes  He started smoking about 44 years ago  He has a 20 00 pack-year smoking history  He uses smokeless tobacco  He reports current alcohol use of about 4 0 standard drinks of alcohol per week  He reports that he does not use drugs  ,  _______________________________________________________________________  Allergies:  is allergic to dust mite extract, molds & smuts, pollen extract, and strawberry extract - food allergy     _______________________________________________________________________  Current Outpatient Medications   Medication Sig Dispense Refill   • Baqsimi Two Pack 3 MG/DOSE POWD SPRAY 3MG INTO ONE NOSTRIL AS NEEDED (FOR SEVERE HYPOGLYCEMIA)       • citalopram (CeleXA) 20 mg tablet Take 1 tablet (20 mg total) by mouth daily 90 tablet 1   • fenofibrate (TRICOR) 145 mg tablet Take 145 mg by mouth daily     • gabapentin (NEURONTIN) 300 mg capsule TAKE ONE CAPSULE BY MOUTH EVERY DAY 30 capsule 0   • insulin glargine (Lantus SoloStar) 100 units/mL injection pen Inject 28 Units under the skin every 12 (twelve) hours     • irbesartan (AVAPRO) 300 mg tablet Take 300 mg by mouth daily     • lansoprazole (PREVACID) 15 mg capsule Take 15 mg by mouth in the morning  • meloxicam (MOBIC) 15 mg tablet Take 1 tablet (15 mg total) by mouth daily 30 tablet 3   • metFORMIN (GLUCOPHAGE) 1000 MG tablet Take 1,000 mg by mouth 2 (two) times a day     • Mounjaro 5 MG/0 5ML SOPN INJECT 5MG (0 5ML)  UNDER THE SKIN ONCE A WEEK     • rosuvastatin (CRESTOR) 20 MG tablet Take 1 tablet (20 mg total) by mouth in the morning  90 tablet 2   • aspirin (ECOTRIN LOW STRENGTH) 81 mg EC tablet Take 81 mg by mouth daily (Patient not taking: No sig reported)     • liraglutide (Victoza) injection Inject 1 8 mg under the skin daily     • omeprazole (PriLOSEC) 20 mg delayed release capsule Take 1 capsule (20 mg total) by mouth daily before breakfast (Patient not taking: No sig reported) 90 capsule 3     No current facility-administered medications for this visit      _______________________________________________________________________  Review of Systems   Constitutional: Negative for fatigue and unexpected weight change  Eyes: Negative for visual disturbance  Respiratory: Negative for chest tightness and shortness of breath  Cardiovascular: Negative for chest pain and palpitations  Gastrointestinal: Negative for abdominal pain, constipation, diarrhea, nausea and vomiting  Endocrine: Negative for polydipsia, polyphagia and polyuria  Genitourinary: Negative for frequency  Musculoskeletal: Positive for arthralgias  Skin: Negative for rash and wound  Neurological: Negative for numbness           Objective:  Vitals:    10/25/22 1657   BP: 120/74   BP Location: Left arm   Patient Position: Sitting   Cuff Size: Standard   Pulse: 80   Resp: 16   Temp: (!) 97 2 °F (36 2 °C)   TempSrc: Temporal   SpO2: 94%   Weight: 102 kg (224 lb)   Height: 5' 11" (1 803 m) Body mass index is 31 24 kg/m²  Physical Exam  Vitals and nursing note reviewed  Constitutional:       Appearance: Normal appearance  He is well-developed and normal weight  Neck:      Thyroid: No thyromegaly  Cardiovascular:      Rate and Rhythm: Normal rate and regular rhythm  Heart sounds: Normal heart sounds  No murmur heard  Pulmonary:      Effort: Pulmonary effort is normal  No respiratory distress  Breath sounds: Normal breath sounds  No wheezing  Musculoskeletal:      Cervical back: Normal range of motion and neck supple  Right lower leg: No edema  Left lower leg: No edema  Lymphadenopathy:      Cervical: No cervical adenopathy  Neurological:      Mental Status: He is alert and oriented to person, place, and time  Cranial Nerves: No cranial nerve deficit  Psychiatric:         Mood and Affect: Mood normal          Behavior: Behavior normal          Thought Content:  Thought content normal          Judgment: Judgment normal

## 2022-10-25 NOTE — ASSESSMENT & PLAN NOTE
LDL 98 and LFTs normal in Sept 2022  Continue current meds  Advised pt to follow a low cholesterol diet and to exercise on a regular basis

## 2022-12-11 DIAGNOSIS — F41.9 ANXIETY: ICD-10-CM

## 2022-12-12 RX ORDER — CITALOPRAM 20 MG/1
TABLET ORAL
Qty: 90 TABLET | Refills: 1 | Status: SHIPPED | OUTPATIENT
Start: 2022-12-12

## 2023-02-09 ENCOUNTER — TELEPHONE (OUTPATIENT)
Dept: FAMILY MEDICINE CLINIC | Facility: CLINIC | Age: 58
End: 2023-02-09

## 2023-02-09 DIAGNOSIS — F41.9 ANXIETY: ICD-10-CM

## 2023-02-09 RX ORDER — CITALOPRAM 40 MG/1
40 TABLET ORAL DAILY
Qty: 30 TABLET | Refills: 3 | Status: SHIPPED | OUTPATIENT
Start: 2023-02-09

## 2023-02-09 NOTE — TELEPHONE ENCOUNTER
David Ramos is looking for a call back from Dr Leroy Larson  Pt is taking Celexa more then he is prescribed  So instead of taking 1 a day he is taking 2 a day  She is not sure if a new script needs to be sent over for 2x daily

## 2023-02-27 DIAGNOSIS — E78.5 DYSLIPIDEMIA: ICD-10-CM

## 2023-02-28 RX ORDER — ROSUVASTATIN CALCIUM 20 MG/1
TABLET, COATED ORAL
Qty: 90 TABLET | Refills: 2 | Status: SHIPPED | OUTPATIENT
Start: 2023-02-28

## 2023-03-16 ENCOUNTER — TELEPHONE (OUTPATIENT)
Dept: FAMILY MEDICINE CLINIC | Facility: CLINIC | Age: 58
End: 2023-03-16

## 2023-03-16 NOTE — TELEPHONE ENCOUNTER
Curry's wife Cele Cedeño called - she wanted to set-up an appt with only you at 5:00 pm, but there's nothing in the near future  She wants Clau Late to get evaluated because he's having trouble concentrating and she is worried      He does have an appt on April 25th, but she says that's too long of a wait and wants him to be seen today or tomorrow    She ask to please call her @ [phone]

## 2023-03-16 NOTE — TELEPHONE ENCOUNTER
Call her  I only see pts at 5:00 or later on Tuesdays  If has to be after that time, has to wait for a Tuesday   Or pt can schedule another day at any time and take time off from work

## 2023-03-17 ENCOUNTER — OFFICE VISIT (OUTPATIENT)
Dept: FAMILY MEDICINE CLINIC | Facility: CLINIC | Age: 58
End: 2023-03-17

## 2023-03-17 VITALS
BODY MASS INDEX: 29.54 KG/M2 | DIASTOLIC BLOOD PRESSURE: 66 MMHG | RESPIRATION RATE: 16 BRPM | SYSTOLIC BLOOD PRESSURE: 100 MMHG | OXYGEN SATURATION: 96 % | HEIGHT: 71 IN | HEART RATE: 86 BPM | TEMPERATURE: 96.1 F | WEIGHT: 211 LBS

## 2023-03-17 DIAGNOSIS — I10 ESSENTIAL HYPERTENSION: Primary | ICD-10-CM

## 2023-03-17 DIAGNOSIS — E11.9 TYPE 2 DIABETES MELLITUS WITHOUT COMPLICATION, WITH LONG-TERM CURRENT USE OF INSULIN (HCC): ICD-10-CM

## 2023-03-17 DIAGNOSIS — Z79.4 TYPE 2 DIABETES MELLITUS WITHOUT COMPLICATION, WITH LONG-TERM CURRENT USE OF INSULIN (HCC): ICD-10-CM

## 2023-03-17 DIAGNOSIS — Z12.5 PROSTATE CANCER SCREENING: ICD-10-CM

## 2023-03-17 DIAGNOSIS — F41.1 GAD (GENERALIZED ANXIETY DISORDER): ICD-10-CM

## 2023-03-17 DIAGNOSIS — E78.2 MIXED HYPERLIPIDEMIA: ICD-10-CM

## 2023-03-17 DIAGNOSIS — K21.9 GASTROESOPHAGEAL REFLUX DISEASE, UNSPECIFIED WHETHER ESOPHAGITIS PRESENT: ICD-10-CM

## 2023-03-17 DIAGNOSIS — R53.83 FATIGUE, UNSPECIFIED TYPE: ICD-10-CM

## 2023-03-17 DIAGNOSIS — Z72.0 TOBACCO USE: ICD-10-CM

## 2023-03-17 NOTE — PROGRESS NOTES
BMI Counseling: Body mass index is 29 43 kg/m²  The BMI is above normal  Nutrition recommendations include decreasing portion sizes, encouraging healthy choices of fruits and vegetables, consuming healthier snacks and moderation in carbohydrate intake  Exercise recommendations include exercising 3-5 times per week  No pharmacotherapy was ordered  Rationale for BMI follow-up plan is due to patient being overweight or obese  Depression Screening and Follow-up Plan: Patient was screened for depression during today's encounter  They screened negative with a PHQ-2 score of 0  Assessment/Plan:         Problem List Items Addressed This Visit        Digestive    GERD (gastroesophageal reflux disease)     Stable  Continue lasoprazole 15mg qd and GERD diet  Endocrine    Type 2 diabetes mellitus without complication, with long-term current use of insulin ([address] )     Pt sees Dr Glenn Ochoa  Continue metformin 1000 mg bid and lantus 20 U bid  Pt was [facility] from [facility] to [facility] and at 10 mg dose now  Foot exam done today  Had eye exam in Feb 2022  Lab Results   Component Value Date    HGBA1C 7 7 (H) 09/15/2022            Relevant Orders    Comprehensive metabolic panel    Microalbumin / creatinine urine ratio    Hemoglobin A1C       Cardiovascular and Mediastinum    Essential hypertension - Primary     BP very good  Continue irbesartan 300 mg qd  Pt advised to continue low Na diet and to exercise on a regular basis  Relevant Orders    Comprehensive metabolic panel    CBC and differential       Other    Tobacco use     Pt still smokes 1/2 PPD  Not ready to quit  Mixed hyperlipidemia     LDL 98 and LFTs normal in Sept 2022  Continue current meds  Advised pt to follow a low cholesterol diet and to exercise on a regular basis            Relevant Orders    Lipid panel    Comprehensive metabolic panel    PIERRE (generalized anxiety disorder)     Stress level was higher and started taking higher dose of citalopram  Pt has been more tired since then  Will cut back to 20 mg qd to see if fatigue improves  Fatigue     Could be from citalopram but will check labs  Relevant Orders    Comprehensive metabolic panel    CBC and differential   Other Visit Diagnoses     Prostate cancer screening        Relevant Orders    PSA, Total Screen            Subjective:      Patient ID: Jordan Argueta is a 61 y o  male  Pt here for f/u HTN, HL, DM, GERD, Tob use, PIERRE  Doing ok  No cp/sob  No HA  Pt has been having episodes of not being able to understand words  Pt also getting more tired at times  Wife is concerned about his symptoms, BP good  Sugars up and down  Sees Endocrinology in April 2023  Pt has been getting discomfort in legs at night and has to move them to relieve it  Diabetes  Hypoglycemia symptoms include nervousness/anxiousness  Associated symptoms include fatigue  Pertinent negatives for diabetes include no chest pain, no polydipsia, no polyphagia and no polyuria  Hypertension  Associated symptoms include anxiety  Pertinent negatives include no chest pain, palpitations or shortness of breath  Anxiety  Symptoms include nervous/anxious behavior  Patient reports no chest pain, nausea, palpitations or shortness of breath  The following portions of the patient's history were reviewed and updated as appropriate:   Past Medical History:  He has a past medical history of Allergic, Anxiety, Diabetes mellitus ([address] ), GERD (gastroesophageal reflux disease), Hyperlipidemia, and Hypertension  ,  _______________________________________________________________________  Medical Problems:  does not have any pertinent problems on file ,  _______________________________________________________________________  Past Surgical History:   has a past surgical history that includes Colonoscopy (01/25/2019); ASD repair; Hernia repair; [place] tooth extraction; and PSA,Screening (Historical) (01/28/2019)  ,  _______________________________________________________________________  Family History:  family history includes Alcohol abuse in his brother; COPD in his brother; Diabetes in his maternal grandfather and mother; Hypertension in his mother; Substance Abuse in his brother; Tuberculosis in his father ,  _______________________________________________________________________  Social History:   reports that he has been smoking cigarettes  He started smoking about 45 years ago  He has a 20 00 pack-year smoking history  He uses smokeless tobacco  He reports current alcohol use of about 4 0 standard drinks per week  He reports that he does not use drugs  ,  _______________________________________________________________________  Allergies:  is allergic to dust mite extract, molds & smuts, pollen extract, and strawberry extract - food allergy     _______________________________________________________________________  Current Outpatient Medications   Medication Sig Dispense Refill   • Baqsimi Two Pack 3 MG/DOSE POWD SPRAY 3MG INTO ONE NOSTRIL AS NEEDED (FOR SEVERE HYPOGLYCEMIA)  • citalopram (CeleXA) 40 mg tablet Take 1 tablet (40 mg total) by mouth daily 30 tablet 3   • fenofibrate (TRICOR) 145 mg tablet Take 145 mg by mouth daily     • gabapentin (NEURONTIN) 300 mg capsule TAKE ONE CAPSULE BY MOUTH EVERY DAY 30 capsule 0   • insulin glargine (Lantus SoloStar) 100 units/mL injection pen Inject 28 Units under the skin every 12 (twelve) hours     • irbesartan (AVAPRO) 300 mg tablet Take 300 mg by mouth daily     • lansoprazole (PREVACID) 15 mg capsule Take 15 mg by mouth in the morning       • meloxicam (MOBIC) 15 mg tablet Take 1 tablet (15 mg total) by mouth daily 30 tablet 3   • metFORMIN (GLUCOPHAGE) 1000 MG tablet Take 1,000 mg by mouth 2 (two) times a day     • Mounjaro 5 MG/0 5ML SOPN INJECT 5MG (0 5ML)  UNDER THE SKIN ONCE A WEEK     • rosuvastatin (CRESTOR) 20 MG tablet TAKE ONE TABLET BY MOUTH EVERY MORNING 90 tablet 2     No current facility-administered medications for this visit      _______________________________________________________________________  Review of Systems   Constitutional: Positive for fatigue  Negative for unexpected weight change  Eyes: Negative for visual disturbance  Respiratory: Negative for chest tightness and shortness of breath  Cardiovascular: Negative for chest pain and palpitations  Gastrointestinal: Negative for abdominal pain, constipation, diarrhea, nausea and vomiting  Endocrine: Negative for polydipsia, polyphagia and polyuria  Genitourinary: Negative for frequency  Musculoskeletal: Positive for arthralgias  Leg discomfort   Skin: Negative for rash and wound  Neurological: Negative for numbness  Psychiatric/Behavioral: The patient is nervous/anxious  Objective:  Vitals:    03/17/23 1554   BP: 100/66   BP Location: Left arm   Patient Position: Sitting   Cuff Size: Standard   Pulse: 86   Resp: 16   Temp: (!) 96 1 °F (35 6 °C)   TempSrc: Tympanic   SpO2: 96%   Weight: 95 7 kg (211 lb)   Height: 5' 11" (1 803 m)     Body mass index is 29 43 kg/m²  Physical Exam  Vitals and nursing note reviewed  Constitutional:       Appearance: Normal appearance  He is well-developed and normal weight  Neck:      Thyroid: No thyromegaly  Cardiovascular:      Rate and Rhythm: Normal rate and regular rhythm  Pulses: no weak pulses     Heart sounds: Normal heart sounds  No murmur heard  Pulmonary:      Effort: Pulmonary effort is normal  No respiratory distress  Breath sounds: Normal breath sounds  No wheezing  Musculoskeletal:      Cervical back: Normal range of motion and neck supple  Right lower leg: No edema  Left lower leg: No edema  Feet:      Right foot:      Skin integrity: No ulcer, skin breakdown, erythema, warmth, callus or dry skin        Left foot:      Skin integrity: No ulcer, skin breakdown, erythema, warmth, callus or dry skin  Lymphadenopathy:      Cervical: No cervical adenopathy  Neurological:      Mental Status: He is alert and oriented to person, place, and time  Cranial Nerves: No cranial nerve deficit  Psychiatric:         Mood and Affect: Mood normal          Behavior: Behavior normal          Thought Content: Thought content normal          Judgment: Judgment normal        Patient's shoes and socks removed  Right Foot/Ankle   Right Foot Inspection  Skin Exam: skin normal and skin intact  No dry skin, no warmth, no callus, no erythema, no maceration, no abnormal color, no pre-ulcer, no ulcer and no callus  Toe Exam: 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

## 2023-03-17 NOTE — ASSESSMENT & PLAN NOTE
Stress level was higher and started taking higher dose of citalopram  Pt has been more tired since then  Will cut back to 20 mg qd to see if fatigue improves

## 2023-03-17 NOTE — ASSESSMENT & PLAN NOTE
Pt sees Dr Mary Carmen Alexis  Continue metformin 1000 mg bid and lantus 20 U bid  Pt was switched from [facility] to [facility] and at 10 mg dose now  Foot exam done today  Had eye exam in Feb 2022       Lab Results   Component Value Date    HGBA1C 7 7 (H) 09/15/2022

## 2023-03-17 NOTE — ASSESSMENT & PLAN NOTE
BP very good  Continue irbesartan 300 mg qd  Pt advised to continue low Na diet and to exercise on a regular basis  Routed to Dr HANNON for advise, thanks. To reception staff, pls call pt for sooner appt.        Future Appointments  Date Time Provider Jenifer Tillman   7/12/2018 4:50 PM Viktor Haddad MD [facility] [place] [id number]

## 2023-03-25 LAB
CREAT ?TM UR-SCNC: 85.8 UMOL/L
EXT MICROALBUMIN URINE RANDOM: 1.8
HBA1C MFR BLD HPLC: 6.8 %
MICROALBUMIN/CREAT UR: 21 MG/G{CREAT}

## 2023-03-25 PROCEDURE — 3044F HG A1C LEVEL LT 7.0%: CPT | Performed by: FAMILY MEDICINE

## 2023-04-25 ENCOUNTER — OFFICE VISIT (OUTPATIENT)
Dept: FAMILY MEDICINE CLINIC | Facility: CLINIC | Age: 58
End: 2023-04-25

## 2023-04-25 VITALS
OXYGEN SATURATION: 96 % | TEMPERATURE: 96.4 F | HEART RATE: 84 BPM | WEIGHT: 207 LBS | HEIGHT: 71 IN | SYSTOLIC BLOOD PRESSURE: 110 MMHG | RESPIRATION RATE: 16 BRPM | BODY MASS INDEX: 28.98 KG/M2 | DIASTOLIC BLOOD PRESSURE: 60 MMHG

## 2023-04-25 DIAGNOSIS — R25.9 ABNORMAL INVOLUNTARY MOVEMENTS: ICD-10-CM

## 2023-04-25 DIAGNOSIS — F41.1 GAD (GENERALIZED ANXIETY DISORDER): Primary | ICD-10-CM

## 2023-04-25 DIAGNOSIS — R53.83 FATIGUE, UNSPECIFIED TYPE: ICD-10-CM

## 2023-04-25 DIAGNOSIS — M54.16 RADICULOPATHY, LUMBAR REGION: ICD-10-CM

## 2023-04-25 RX ORDER — BLOOD-GLUCOSE SENSOR
EACH MISCELLANEOUS
COMMUNITY
Start: 2023-04-07

## 2023-04-25 RX ORDER — GABAPENTIN 300 MG/1
300 CAPSULE ORAL 2 TIMES DAILY
Qty: 60 CAPSULE | Refills: 3 | Status: SHIPPED | OUTPATIENT
Start: 2023-04-25

## 2023-04-25 NOTE — PROGRESS NOTES
Assessment/Plan:         Problem List Items Addressed This Visit        Nervous and Auditory    Radiculopathy, lumbar region    Relevant Medications    gabapentin (NEURONTIN) 300 mg capsule    Abnormal involuntary movements     Pt gets jerking movements of lower extremities at times  Worse at night and at rest  Has had them for years  Will refer to Neurology for eval  Will restart gabapentin 300 mg qd for 1 week and then bid  Relevant Orders    Ambulatory Referral to Neurology       Other    PIERRE (generalized anxiety disorder) - Primary     Pt feels better since citalopram lowered to 20 mg qd  Fatigue     Labs done and were normal  Doing better now and was likely due to higher dose of citalopram                Subjective:      Patient ID: Dmitriy Farley is a 61 y o  male  Pt here for f/u PIERRE and fatigue  Pt was feeling foggy on higher dose of citalopram 40 mg qd and was also feeling tired  Pt was told to cut back to 20 mg qd and has felt better  Started to feel better about 1 week after lower the dose  No cp/sob  No HA  Legs still jerk at times when sleeping  The following portions of the patient's history were reviewed and updated as appropriate:   Past Medical History:  He has a past medical history of Allergic, Anxiety, Diabetes mellitus ([address] ), GERD (gastroesophageal reflux disease), Hyperlipidemia, and Hypertension  ,  _______________________________________________________________________  Medical Problems:  does not have any pertinent problems on file ,  _______________________________________________________________________  Past Surgical History:   has a past surgical history that includes Colonoscopy (01/25/2019); ASD repair; Hernia repair; [place] tooth extraction; and PSA,Screening (Historical) (01/28/2019)  ,  _______________________________________________________________________  Family History:  family history includes Alcohol abuse in his brother; COPD in his brother; Diabetes in his maternal grandfather and mother; Hypertension in his mother; Substance Abuse in his brother; Tuberculosis in his father ,  _______________________________________________________________________  Social History:   reports that he has been smoking cigarettes  He started smoking about 45 years ago  He has a 20 00 pack-year smoking history  He uses smokeless tobacco  He reports current alcohol use of about 4 0 standard drinks per week  He reports that he does not use drugs  ,  _______________________________________________________________________  Allergies:  is allergic to dust mite extract, molds & smuts, pollen extract, and strawberry extract - food allergy     _______________________________________________________________________  Current Outpatient Medications   Medication Sig Dispense Refill   • Baqsimi Two Pack 3 MG/DOSE POWD SPRAY 3MG INTO ONE NOSTRIL AS NEEDED (FOR SEVERE HYPOGLYCEMIA)  • citalopram (CeleXA) 40 mg tablet Take 1 tablet (40 mg total) by mouth daily 30 tablet 3   • Continuous Blood Gluc Sensor (Dexcom G7 Sensor) USE ONE SENSOR EVERY 10 DAYS     • fenofibrate (TRICOR) 145 mg tablet Take 145 mg by mouth daily     • gabapentin (NEURONTIN) 300 mg capsule Take 1 capsule (300 mg total) by mouth 2 (two) times a day 60 capsule 3   • insulin glargine (Lantus SoloStar) 100 units/mL injection pen Inject 28 Units under the skin every 12 (twelve) hours     • irbesartan (AVAPRO) 300 mg tablet Take 300 mg by mouth daily     • lansoprazole (PREVACID) 15 mg capsule Take 15 mg by mouth in the morning       • meloxicam (MOBIC) 15 mg tablet Take 1 tablet (15 mg total) by mouth daily 30 tablet 3   • metFORMIN (GLUCOPHAGE) 1000 MG tablet Take 1,000 mg by mouth 2 (two) times a day     • Mounjaro 5 MG/0 5ML SOPN INJECT 5MG (0 5ML)  UNDER THE SKIN ONCE A WEEK     • rosuvastatin (CRESTOR) 20 MG tablet TAKE ONE TABLET BY MOUTH EVERY MORNING 90 tablet 2     No current facility-administered medications for this "visit      _______________________________________________________________________  Review of Systems   Constitutional: Negative for fatigue and unexpected weight change  Eyes: Negative for visual disturbance  Respiratory: Negative for chest tightness and shortness of breath  Cardiovascular: Negative for chest pain and palpitations  Gastrointestinal: Negative for abdominal pain, constipation, diarrhea, nausea and vomiting  Endocrine: Negative for polydipsia, polyphagia and polyuria  Genitourinary: Negative for frequency  Musculoskeletal: Positive for arthralgias  Leg discomfort   Skin: Negative for rash and wound  Neurological: Negative for numbness  Psychiatric/Behavioral: The patient is not nervous/anxious  Objective:  Vitals:    04/25/23 1656   BP: 110/60   BP Location: Left arm   Patient Position: Sitting   Cuff Size: Standard   Pulse: 84   Resp: 16   Temp: (!) 96 4 °F (35 8 °C)   TempSrc: Tympanic   SpO2: 96%   Weight: 93 9 kg (207 lb)   Height: 5' 11\" (1 803 m)     Body mass index is 28 87 kg/m²  Physical Exam  Vitals and nursing note reviewed  Constitutional:       Appearance: Normal appearance  He is well-developed and normal weight  Neck:      Thyroid: No thyromegaly  Cardiovascular:      Rate and Rhythm: Normal rate and regular rhythm  Heart sounds: Normal heart sounds  No murmur heard  Pulmonary:      Effort: Pulmonary effort is normal  No respiratory distress  Breath sounds: Normal breath sounds  No wheezing  Musculoskeletal:      Cervical back: Normal range of motion and neck supple  Right lower leg: No edema  Left lower leg: No edema  Lymphadenopathy:      Cervical: No cervical adenopathy  Neurological:      Mental Status: He is alert and oriented to person, place, and time  Cranial Nerves: No cranial nerve deficit     Psychiatric:         Mood and Affect: Mood normal          Behavior: Behavior normal          Thought Content: " Thought content normal          Judgment: Judgment normal

## 2023-04-25 NOTE — ASSESSMENT & PLAN NOTE
Pt gets jerking movements of lower extremities at times  Worse at night and at rest  Has had them for years  Will refer to Neurology for eval  Will restart gabapentin 300 mg qd for 1 week and then bid

## 2023-05-09 DIAGNOSIS — F41.9 ANXIETY: ICD-10-CM

## 2023-05-09 RX ORDER — CITALOPRAM 20 MG/1
TABLET ORAL
Qty: 90 TABLET | Refills: 1 | Status: SHIPPED | OUTPATIENT
Start: 2023-05-09

## 2023-09-01 DIAGNOSIS — M54.16 RADICULOPATHY, LUMBAR REGION: ICD-10-CM

## 2023-09-01 LAB
LEFT EYE DIABETIC RETINOPATHY: NORMAL
RIGHT EYE DIABETIC RETINOPATHY: NORMAL

## 2023-09-01 RX ORDER — GABAPENTIN 300 MG/1
300 CAPSULE ORAL 2 TIMES DAILY
Qty: 60 CAPSULE | Refills: 3 | Status: SHIPPED | OUTPATIENT
Start: 2023-09-01

## 2023-10-06 ENCOUNTER — TELEPHONE (OUTPATIENT)
Age: 58
End: 2023-10-06

## 2023-10-06 ENCOUNTER — PATIENT MESSAGE (OUTPATIENT)
Age: 58
End: 2023-10-06

## 2023-10-06 ENCOUNTER — PREP FOR PROCEDURE (OUTPATIENT)
Age: 58
End: 2023-10-06

## 2023-10-06 ENCOUNTER — OFFICE VISIT (OUTPATIENT)
Dept: FAMILY MEDICINE CLINIC | Facility: CLINIC | Age: 58
End: 2023-10-06
Payer: COMMERCIAL

## 2023-10-06 VITALS
DIASTOLIC BLOOD PRESSURE: 70 MMHG | HEIGHT: 71 IN | WEIGHT: 203 LBS | RESPIRATION RATE: 16 BRPM | BODY MASS INDEX: 28.42 KG/M2 | HEART RATE: 82 BPM | SYSTOLIC BLOOD PRESSURE: 110 MMHG | TEMPERATURE: 98 F | OXYGEN SATURATION: 96 %

## 2023-10-06 DIAGNOSIS — E78.2 MIXED HYPERLIPIDEMIA: ICD-10-CM

## 2023-10-06 DIAGNOSIS — I10 ESSENTIAL HYPERTENSION: Primary | ICD-10-CM

## 2023-10-06 DIAGNOSIS — Z79.4 TYPE 2 DIABETES MELLITUS WITHOUT COMPLICATION, WITH LONG-TERM CURRENT USE OF INSULIN (HCC): ICD-10-CM

## 2023-10-06 DIAGNOSIS — Z11.4 SCREENING FOR HIV (HUMAN IMMUNODEFICIENCY VIRUS): ICD-10-CM

## 2023-10-06 DIAGNOSIS — Z11.59 NEED FOR HEPATITIS C SCREENING TEST: ICD-10-CM

## 2023-10-06 DIAGNOSIS — L08.9 SUPERFICIAL SKIN INFECTION: ICD-10-CM

## 2023-10-06 DIAGNOSIS — Z72.0 TOBACCO USE: ICD-10-CM

## 2023-10-06 DIAGNOSIS — F41.1 GAD (GENERALIZED ANXIETY DISORDER): ICD-10-CM

## 2023-10-06 DIAGNOSIS — E11.9 TYPE 2 DIABETES MELLITUS WITHOUT COMPLICATION, WITH LONG-TERM CURRENT USE OF INSULIN (HCC): ICD-10-CM

## 2023-10-06 DIAGNOSIS — Z12.11 SCREENING FOR COLON CANCER: Primary | ICD-10-CM

## 2023-10-06 DIAGNOSIS — K63.5 POLYP OF COLON, UNSPECIFIED PART OF COLON, UNSPECIFIED TYPE: ICD-10-CM

## 2023-10-06 DIAGNOSIS — K21.9 GASTROESOPHAGEAL REFLUX DISEASE, UNSPECIFIED WHETHER ESOPHAGITIS PRESENT: ICD-10-CM

## 2023-10-06 DIAGNOSIS — Z23 ENCOUNTER FOR IMMUNIZATION: ICD-10-CM

## 2023-10-06 PROCEDURE — 90686 IIV4 VACC NO PRSV 0.5 ML IM: CPT | Performed by: FAMILY MEDICINE

## 2023-10-06 PROCEDURE — 90471 IMMUNIZATION ADMIN: CPT | Performed by: FAMILY MEDICINE

## 2023-10-06 PROCEDURE — 99214 OFFICE O/P EST MOD 30 MIN: CPT | Performed by: FAMILY MEDICINE

## 2023-10-06 RX ORDER — CEPHALEXIN 500 MG/1
500 CAPSULE ORAL 3 TIMES DAILY
Qty: 21 CAPSULE | Refills: 0 | Status: SHIPPED | OUTPATIENT
Start: 2023-10-06 | End: 2023-10-13

## 2023-10-06 NOTE — ASSESSMENT & PLAN NOTE
Blood pressure good. Continue irbesartan 300 mg qd. Pt advised to continue low Na diet and to exercise on a regular basis.

## 2023-10-06 NOTE — TELEPHONE ENCOUNTER
Scheduled date of colonoscopy (as of today): 10/20/2023    Physician performing colonoscopy: Joel Skinner    Location of colonoscopy: [facility]    Bowel prep reviewed with patient: Miralax/Dulcolax    Instructions reviewed with patient by: HEIDY Alcazar    Clearances: None

## 2023-10-06 NOTE — ASSESSMENT & PLAN NOTE
LDL 81 and LFTs normal in March 2023. Continue current meds. Advised pt to follow a low cholesterol diet and to exercise on a regular basis.

## 2023-10-06 NOTE — TELEPHONE ENCOUNTER
Screened by: Lobito Washburn     Referring Provider: Zoë Santiago     Weight: 200  Height: 5' 11"  BMI: 27.9     Has patient been referred for a routine screening Colonoscopy? yes  Is the patient between 43-73 years old? yes        Previous Colonoscopy? yes  If yes:   Date: 2019  Provider/Location: [facility] STAFF: If the patient is between 39yrs-51yrs, please advise patient to confirm benefits/coverage with their insurance company for a routine screening colonoscopy, some insurance carriers will only cover at [address] [place] [address] or older. If the patient is over 66years old, please schedule an office visit. Does the patient want to see a Gastroenterologist prior to their procedure OR are they having any GI symptoms? no     Has the patient been hospitalized or had abdominal surgery in the past 6 months? no     Does the patient use supplemental oxygen? no    Does the patient take Coumadin, Lovenox, Plavix, Elliquis, Xarelto, or other blood thinning medication? no     Has the patient had a stroke, cardiac event, or stent placed in the past year? no     SCHEDULING STAFF: If patient answers NO to above questions, then schedule procedure. If patient answers YES to above questions, then schedule office appointment. If patient is between 45yrs - 49yrs, please advise patient that we will have to confirm benefits & coverage with their insurance company for a routine screening colonoscopy.

## 2023-10-06 NOTE — ASSESSMENT & PLAN NOTE
A1C 6.8 in March 2023. Pt sees Dr Kyle Amanda. Continue metformin 1000 mg bid and lantus 20 U bid. [place] [facility] [place] [facility] Foot exam done in March 2023. Had eye exam in Feb 2022. Urine albumin/creatinine ratio done in March 2023.     Lab Results   Component Value Date    HGBA1C 6.8 (H) 03/25/2023

## 2023-10-06 NOTE — PROGRESS NOTES
Depression Screening and Follow-up Plan: Patient was screened for depression during today's encounter. They screened negative with a PHQ-2 score of 0. Assessment/Plan:         Problem List Items Addressed This Visit        Digestive    GERD (gastroesophageal reflux disease)     No recent symptoms. Continue lasoprazole 15mg qd and GERD diet. Colon polyps    Relevant Orders    Ambulatory Referral to Gastroenterology       Endocrine    Type 2 diabetes mellitus without complication, with long-term current use of insulin ([facility])     A1C 6.8 in March 2023. Pt sees Dr Yenni Chang. Continue metformin 1000 mg bid and lantus 20 U bid. Pt was switched from [facility] to [facility]. Foot exam done in March 2023. Had eye exam in Feb 2022. Urine albumin/creatinine ratio done in March 2023. Lab Results   Component Value Date    HGBA1C 6.8 (H) 03/25/2023               Cardiovascular and Mediastinum    Essential hypertension - Primary     Blood pressure good. Continue irbesartan 300 mg qd. Pt advised to continue low Na diet and to exercise on a regular basis. Musculoskeletal and Integument    Superficial skin infection     Has it adjacent to left thumbnail. Will start antibiotics. Relevant Medications    cephalexin (KEFLEX) 500 mg capsule       Other    Tobacco use     Pt still smokes 1/2 PPD. Not ready to quit. Will check LDCT. Relevant Orders    CT lung screening program    Mixed hyperlipidemia     LDL 81 and LFTs normal in March 2023. Continue current meds. Advised pt to follow a low cholesterol diet and to exercise on a regular basis. PIERRE (generalized anxiety disorder)     Stable. Continue citalopram 20 mg qd.          Other Visit Diagnoses     Encounter for immunization        Relevant Orders    influenza vaccine, quadrivalent, 0.5 mL, preservative-free, for adult and pediatric patients 6 mos+ (AFLURIA, FLUARIX, FLULAVAL, FLUZONE)    Need for hepatitis C screening test        Relevant Orders    Hepatitis C Antibody    Screening for HIV (human immunodeficiency virus)        Relevant Orders    HIV 1/2 AG/AB w Reflex [facility] for 2 yr old and above            Subjective:      Patient ID: Chuck Guthrie is a 60 y.o. male. Patient here for follow-up Hypertension, Hyperlipidemia, DM, GERD, Generalized Anxiety Disorder, Tobacco use. Doing well. No chest pain or shortness of breath. Blood pressure has been good. No recent GERD. Stress level ok. Sugars are good. Still smokes 1/2 ppd. Wants flu shot. Patient has had redness and swelling left thumb since cut it using jigsaw. The following portions of the patient's history were reviewed and updated as appropriate:   Past Medical History:  He has a past medical history of Allergic, Anxiety, Diabetes mellitus ([address]), GERD (gastroesophageal reflux disease), Hyperlipidemia, and Hypertension. ,  _______________________________________________________________________  Medical Problems:  does not have any pertinent problems on file.,  _______________________________________________________________________  Past Surgical History:   has a past surgical history that includes Colonoscopy (01/25/2019); ASD repair; Hernia repair; [place] tooth extraction; and PSA,Screening (Historical) (01/28/2019). ,  _______________________________________________________________________  Family History:  family history includes Alcohol abuse in his brother; COPD in his brother; Diabetes in his maternal grandfather and mother; Hypertension in his mother; Substance Abuse in his brother; Tuberculosis in his father.,  _______________________________________________________________________  Social History:   reports that he has been smoking cigarettes. He started smoking about 45 years ago. He has a 20.00 pack-year smoking history. He uses smokeless tobacco. He reports current alcohol use of about 4.0 standard drinks of alcohol per week.  He reports that he does not use drugs.,  _______________________________________________________________________  Allergies:  is allergic to dust mite extract, molds & smuts, pollen extract, and strawberry extract - food allergy. .  _______________________________________________________________________  Current Outpatient Medications   Medication Sig Dispense Refill   • Baqsimi Two Pack 3 MG/DOSE POWD SPRAY 3MG INTO ONE NOSTRIL AS NEEDED (FOR SEVERE HYPOGLYCEMIA). • cephalexin (KEFLEX) 500 mg capsule Take 1 capsule (500 mg total) by mouth 3 (three) times a day for 7 days 21 capsule 0   • citalopram (CeleXA) 20 mg tablet TAKE ONE TABLET BY MOUTH EVERY DAY 90 tablet 1   • Continuous Blood Gluc Sensor (Dexcom G7 Sensor) USE ONE SENSOR EVERY 10 DAYS     • fenofibrate (TRICOR) 145 mg tablet Take 145 mg by mouth daily     • gabapentin (NEURONTIN) 300 mg capsule TAKE ONE CAPSULE BY MOUTH TWICE A DAY 60 capsule 3   • insulin glargine (Lantus SoloStar) 100 units/mL injection pen Inject 28 Units under the skin every 12 (twelve) hours     • irbesartan (AVAPRO) 300 mg tablet Take 300 mg by mouth daily     • lansoprazole (PREVACID) 15 mg capsule Take 15 mg by mouth in the morning. • meloxicam (MOBIC) 15 mg tablet Take 1 tablet (15 mg total) by mouth daily 30 tablet 3   • metFORMIN (GLUCOPHAGE) 1000 MG tablet Take 1,000 mg by mouth 2 (two) times a day     • Mounjaro 5 MG/0.5ML SOPN INJECT 5MG (0.5ML)  UNDER THE SKIN ONCE A WEEK     • rosuvastatin (CRESTOR) 20 MG tablet TAKE ONE TABLET BY MOUTH EVERY MORNING 90 tablet 2     No current facility-administered medications for this visit.     _______________________________________________________________________  Review of Systems   Constitutional: Negative for fatigue and unexpected weight change. Respiratory: Negative for cough and shortness of breath. Cardiovascular: Negative for chest pain. Gastrointestinal: Negative for abdominal pain, constipation, diarrhea and vomiting.    Musculoskeletal: Positive for arthralgias. Skin:        Redness and swelling left thumb   Neurological: Negative for dizziness and headaches. Psychiatric/Behavioral: Negative for dysphoric mood. The patient is not nervous/anxious. Objective:  Vitals:    10/06/23 1052   BP: 110/70   BP Location: Right arm   Patient Position: Sitting   Cuff Size: Standard   Pulse: 82   Resp: 16   Temp: 98 °F (36.7 °C)   TempSrc: Tympanic   SpO2: 96%   Weight: 92.1 kg (203 lb)   Height: 5' 11" (1.803 m)     Body mass index is 28.31 kg/m². Physical Exam  Vitals and nursing note reviewed. Constitutional:       Appearance: Normal appearance. He is well-developed and normal weight. Neck:      Thyroid: No thyromegaly. Cardiovascular:      Rate and Rhythm: Normal rate and regular rhythm. Heart sounds: Normal heart sounds. No murmur heard. Pulmonary:      Effort: Pulmonary effort is normal. No respiratory distress. Breath sounds: Normal breath sounds. No wheezing. Musculoskeletal:      Cervical back: Normal range of motion and neck supple. Right lower leg: No edema. Left lower leg: No edema. Comments: Erythematous, swelling, and tenderness to palpation adjacent to left thumbnail. Lymphadenopathy:      Cervical: No cervical adenopathy. Neurological:      Mental Status: He is alert and oriented to person, place, and time. Cranial Nerves: No cranial nerve deficit. Psychiatric:         Mood and Affect: Mood normal.         Behavior: Behavior normal.         Thought Content:  Thought content normal.         Judgment: Judgment normal.

## 2023-10-09 ENCOUNTER — TELEPHONE (OUTPATIENT)
Age: 58
End: 2023-10-09

## 2023-10-09 NOTE — TELEPHONE ENCOUNTER
Patient cancelled colonoscopy with Dr. Branden Cotter @ [facility] due to conflicting appts. He prefers to contact our office when ready to reschedule.

## 2023-10-13 ENCOUNTER — HOSPITAL ENCOUNTER (OUTPATIENT)
Dept: RADIOLOGY | Facility: MEDICAL CENTER | Age: 58
Discharge: HOME | End: 2023-10-13
Payer: COMMERCIAL

## 2023-10-13 DIAGNOSIS — Z72.0 TOBACCO USE: ICD-10-CM

## 2023-10-13 PROCEDURE — 71271 CT THORAX LUNG CANCER SCR C-: CPT

## 2023-10-21 DIAGNOSIS — F41.9 ANXIETY: ICD-10-CM

## 2023-10-23 RX ORDER — CITALOPRAM 20 MG/1
TABLET ORAL
Qty: 90 TABLET | Refills: 1 | Status: SHIPPED | OUTPATIENT
Start: 2023-10-23

## 2023-11-03 LAB
EXTERNAL HIV SCREEN: NORMAL
HCV AB SER-ACNC: NORMAL

## 2023-11-16 ENCOUNTER — TELEPHONE (OUTPATIENT)
Age: 58
End: 2023-11-16

## 2023-11-16 NOTE — TELEPHONE ENCOUNTER
Inez Cotter didn't feel Dr ESPINAL should get this message from call center about his CT lung screening bill of $2000

## 2023-11-16 NOTE — TELEPHONE ENCOUNTER
Patient called stating that he would like to speak with [facility] personally about a billing question for CT lung scan on 10/13/23. He states PCP personally told him that this was covered by his insurance, but he is now receiving a bill over $2,000. I attempted to assist the patient by giving him the phone number for billing, and explained he should call them in the mean time while I send a message to his PCP.

## 2023-11-17 ENCOUNTER — VBI (OUTPATIENT)
Dept: ADMINISTRATIVE | Facility: OTHER | Age: 58
End: 2023-11-17

## 2023-11-24 DIAGNOSIS — E78.5 DYSLIPIDEMIA: ICD-10-CM

## 2023-11-24 RX ORDER — ROSUVASTATIN CALCIUM 20 MG/1
TABLET, COATED ORAL
Qty: 90 TABLET | Refills: 1 | Status: SHIPPED | OUTPATIENT
Start: 2023-11-24

## 2023-11-26 ENCOUNTER — VBI (OUTPATIENT)
Dept: ADMINISTRATIVE | Facility: OTHER | Age: 58
End: 2023-11-26

## 2023-11-27 NOTE — TELEPHONE ENCOUNTER
Can you call Kailee Field and let him know to disregard the $2000 bill. It looks like [employer] made a payment on 11/22. He has no balance now.

## 2023-12-04 ENCOUNTER — TELEPHONE (OUTPATIENT)
Age: 58
End: 2023-12-04

## 2023-12-04 NOTE — TELEPHONE ENCOUNTER
Patient requested a pre op clearance for surgery on 12/15/23. Patient was offered 12/11/23 but will not be available for that day due to his schedule. He is available Monday - Thursday after 4pm and Friday's he is off. Please contact patient with a sooner slot. Thank you for your prompt response. It is greatly appreciated.

## 2023-12-07 RX ORDER — CETIRIZINE HYDROCHLORIDE 10 MG/1
10 TABLET ORAL DAILY
COMMUNITY

## 2023-12-07 NOTE — PRE-PROCEDURE INSTRUCTIONS
Pre-Surgery Instructions:   Medication Instructions    Baqsimi Two Pack 3 MG/DOSE POWD Uses PRN- OK to take day of surgery    cetirizine (ZyrTEC) 10 mg tablet Take day of surgery.    citalopram (CeleXA) 20 mg tablet Take day of surgery.    Continuous Blood Gluc Sensor (Dexcom G7 Sensor) Take day of surgery.    fenofibrate (TRICOR) 145 mg tablet Take day of surgery.    gabapentin (NEURONTIN) 300 mg capsule Take day of surgery.    insulin glargine (Lantus SoloStar) 100 units/mL injection pen Take day of surgery.    irbesartan (AVAPRO) 300 mg tablet Hold day of surgery.    meloxicam (MOBIC) 15 mg tablet Stop taking 3 days prior to surgery.    metFORMIN (GLUCOPHAGE) 1000 MG tablet Hold day of surgery.    Mounjaro 5 MG/0.5ML SOPN Stop taking 7 days prior to surgery.    rosuvastatin (CRESTOR) 20 MG tablet Take day of surgery.      Pt verbalizes understanding of the following (& sent via [employer] at pt's request):    Please reference your “My Surgical Experience Booklet” for additional information to prepare for your upcoming surgery.      - DO NOT EAT OR DRINK ANYTHING after midnight on the evening before your procedure including coffee, tea, gum or hard candy.    - ONLY SIPS OF WATER with your medications are allowed on the morning of your procedure.  - Avoid OTC non-directed Vit/ Suppl/ Herbals 7 days prior to surgery to ensure no drug interactions with perioperative surgical/ anesthetic meds  - Avoid NSAIDs 3 days prior. Tylenol is ok to take as needed.   - Avoid ASA containing products 5 days prior, unless otherwise instructed by your provider   - Continue statin medication up through and including the day of surgery  - Insulin Management: If on Insulin, advised to call PCP for explicit instructions  - Bring a list of meds you take at home with your last dose noted    - Avoid alcohol & cigarette smoking 24hrs before your surgery.     - Follow the pre surgery showering instructions as listed in the “My Surgical  Experience Booklet” or otherwise provided by your surgeon's office.  - Bathing instructions, will get chg 12/8, neck down, no genitals  - No lotions, powders, sprays, deodorant, cologne, jewelry  - Do not use a blade to shave the surgical area 1 week before surgery. It is ok to use clean electric clippers up to 24 hours before surgery. Do not shave any body parts with a razor within 24hrs.  - Do not use dry shampoo, hair spray, hair gel, or any type of hair products.     - For outpatient surgery, arrange for someone to drive you home after the procedure & stay with you until the next morning. Visitor guidelines discussed.   - Bring insurance cards & photo id    - Leave all valuables such as credit cards, money & jewelry at home  - Please bring any specially ordered equipment (sling, braces) if indicated.  - Wear causal clothing that is easy to take on and off. Consider your type of surgery.    - Notify surgeon if you develop any new illnesses, exposure, develop a rash/ open wounds or have additional questions prior to your surgery.    - Did the surgeon's office give you any other special instructions? 12/8 appt w/ surgeon  - Did surgeon require any clearances? PCP 12/8    You will receive a call one business day prior to surgery with an arrival time and hospital directions. If your surgery is scheduled on a Monday, the hospital will be calling you on the Friday prior to your surgery.

## 2023-12-08 ENCOUNTER — CONSULT (OUTPATIENT)
Dept: FAMILY MEDICINE CLINIC | Facility: CLINIC | Age: 58
End: 2023-12-08
Payer: COMMERCIAL

## 2023-12-08 VITALS
TEMPERATURE: 97.4 F | BODY MASS INDEX: 28.81 KG/M2 | HEART RATE: 85 BPM | SYSTOLIC BLOOD PRESSURE: 121 MMHG | HEIGHT: 71 IN | DIASTOLIC BLOOD PRESSURE: 69 MMHG | WEIGHT: 205.8 LBS | OXYGEN SATURATION: 96 %

## 2023-12-08 DIAGNOSIS — Z01.818 PRE-OPERATIVE CLEARANCE: Primary | ICD-10-CM

## 2023-12-08 PROCEDURE — 99213 OFFICE O/P EST LOW 20 MIN: CPT

## 2023-12-08 NOTE — PROGRESS NOTES
Ian Cheung 1965 male MRN: [id number]        ASSESSMENT/PLAN  Problem List Items Addressed This Visit          Other    Pre-operative clearance - Primary     Pt scheduled for low risk L foot 4th & 5th hammertoe correction on 01/05/24 under IV sedation. Pt's diabetes is controlled with medication and insulin therapy. On exam no sxs of active infection, BP stable. Reviewed medication list pt aware to stop Metformin 24 hrs prior to surgery and GLP-1 agonist Mounjaro 1 week prior to surgery. Pt may proceed with surgery as planned without any further work-up. High Risk Surgery: no  CAD: no  CHF: no  CVD: no  DM2 on insulin: yes  Cr>2: no        Ian Cheung is undergoing a Low Risk surgery. He is at [address] [place] [address],[address] for major adverse cardiac event (MACE). He may proceed with surgery as planned without further workup. SUBJECTIVE  CC: Pre-op Exam      HPI:  Ian Cheung is a 60 y.o. male who is planning to undergo 4TH AND 5TH HAMMERTOE CORRECTION  under IV sedation by Lyric Levine on 01/05/24. Patient has not had complications with anesthesia in the past.  Functional status: Independent    Review of Systems   Constitutional:  Negative for activity change, appetite change, chills, diaphoresis, fatigue, fever and unexpected weight change. HENT:  Negative for congestion, dental problem, drooling, ear discharge, ear pain, facial swelling, hearing loss, mouth sores, nosebleeds, postnasal drip, rhinorrhea, sinus pressure, sinus pain, sneezing, sore throat, tinnitus, trouble swallowing and voice change. Eyes:  Negative for photophobia, pain, discharge, redness, itching and visual disturbance. Respiratory:  Negative for apnea, cough, choking, chest tightness, shortness of breath, wheezing and stridor. Cardiovascular:  Negative for chest pain, palpitations and leg swelling.    Gastrointestinal:  Negative for abdominal distention, abdominal pain, anal bleeding, blood in stool, constipation, diarrhea, nausea and vomiting. Endocrine: Negative for cold intolerance, heat intolerance, polydipsia, polyphagia and polyuria. Genitourinary:  Negative for decreased urine volume, difficulty urinating, dysuria, flank pain, frequency, hematuria, penile discharge, scrotal swelling, testicular pain and urgency. Musculoskeletal:  Negative for arthralgias, back pain, gait problem, joint swelling, myalgias, neck pain and neck stiffness. Skin:  Negative for color change, rash and wound. Allergic/Immunologic: Positive for environmental allergies. Negative for food allergies and immunocompromised state. Neurological:  Negative for dizziness, tremors, seizures, syncope, facial asymmetry, weakness, light-headedness, numbness and headaches. Hematological:  Negative for adenopathy. Does not bruise/bleed easily. Psychiatric/Behavioral:  Negative for agitation, behavioral problems, confusion, decreased concentration, dysphoric mood, hallucinations, self-injury, sleep disturbance and suicidal ideas. The patient is not nervous/anxious and is not hyperactive. All other systems reviewed and are negative.         Historical Information   The patient history was reviewed as follows:    Past Medical History:   Diagnosis Date    Allergic     Anxiety     Diabetes mellitus (HCC)     GERD (gastroesophageal reflux disease)     Hyperlipidemia     Hypertension     Sleep apnea     does not use CPAP anymore     Past Surgical History:   Procedure Laterality Date    ASD REPAIR      COLONOSCOPY  01/25/2019    Due 1/2024    HERNIA REPAIR      rpr umbil johny reduc >5 yr    PSA,SCREENING (HISTORICAL)  01/28/2019    WISDOM TOOTH EXTRACTION       Family History   Problem Relation Age of Onset    Diabetes Mother     Hypertension Mother     Tuberculosis Father     Diabetes Maternal Grandfather     Alcohol abuse Brother     Substance Abuse Brother     COPD Brother       Social History       Medications:     Current Outpatient Medications:     Baqsimi Two Pack 3 MG/DOSE POWD, SPRAY 3MG INTO ONE NOSTRIL AS NEEDED (FOR SEVERE HYPOGLYCEMIA). , Disp: , Rfl:     cetirizine (ZyrTEC) 10 mg tablet, Take 10 mg by mouth daily, Disp: , Rfl:     citalopram (CeleXA) 20 mg tablet, TAKE ONE TABLET BY MOUTH EVERY DAY, Disp: 90 tablet, Rfl: 1    Continuous Blood Gluc Sensor (Dexcom G7 Sensor), USE ONE SENSOR EVERY 10 DAYS, Disp: , Rfl:     insulin glargine (Lantus SoloStar) 100 units/mL injection pen, Inject 28 Units under the skin every 12 (twelve) hours, Disp: , Rfl:     irbesartan (AVAPRO) 300 mg tablet, Take 300 mg by mouth daily, Disp: , Rfl:     metFORMIN (GLUCOPHAGE) 1000 MG tablet, Take 1,000 mg by mouth 2 (two) times a day, Disp: , Rfl:     Mounjaro 5 MG/0.5ML SOPN, Inject 10 mg under the skin every 7 days, Disp: , Rfl:     rosuvastatin (CRESTOR) 20 MG tablet, TAKE ONE TABLET BY MOUTH EVERY MORNING, Disp: 90 tablet, Rfl: 1    fenofibrate (TRICOR) 145 mg tablet, Take 145 mg by mouth daily, Disp: , Rfl:     gabapentin (NEURONTIN) 300 mg capsule, TAKE ONE CAPSULE BY MOUTH TWICE A DAY, Disp: 60 capsule, Rfl: 3    meloxicam (MOBIC) 15 mg tablet, Take 1 tablet (15 mg total) by mouth daily (Patient not taking: Reported on 12/8/2023), Disp: 30 tablet, Rfl: 3  Allergies   Allergen Reactions    Dust Mite Extract Allergic Rhinitis    Molds & Smuts Allergic Rhinitis    Pollen Extract Allergic Rhinitis     Maple trees    Strawberry Extract - Food Allergy Rash       OBJECTIVE    Vitals:   Vitals:    12/08/23 1313   BP: 121/69   BP Location: Left arm   Patient Position: Sitting   Cuff Size: Adult   Pulse: 85   Temp: (!) 97.4 °F (36.3 °C)   TempSrc: Tympanic   SpO2: 96%   Weight: 93.4 kg (205 lb 12.8 oz)   Height: 5' 11" (1.803 m)           Physical Exam  Vitals and nursing note reviewed. Constitutional:       General: He is not in acute distress. Appearance: Normal appearance. He is normal weight. He is not ill-appearing, toxic-appearing or diaphoretic.    HENT:      Head: Normocephalic and atraumatic. Right Ear: Tympanic membrane, ear canal and external ear normal. There is no impacted cerumen. Left Ear: Tympanic membrane, ear canal and external ear normal. There is no impacted cerumen. Nose: Nose normal. No congestion or rhinorrhea. Mouth/Throat:      Mouth: Mucous membranes are moist.      Pharynx: Oropharynx is clear. No oropharyngeal exudate or posterior oropharyngeal erythema. Eyes:      General:         Right eye: No discharge. Left eye: No discharge. Extraocular Movements: Extraocular movements intact. Conjunctiva/sclera: Conjunctivae normal.      Pupils: Pupils are equal, round, and reactive to light. Neck:      Vascular: No carotid bruit. Cardiovascular:      Rate and Rhythm: Normal rate and regular rhythm. Pulses: Normal pulses. Heart sounds: Normal heart sounds. No murmur heard. No friction rub. No gallop. Pulmonary:      Effort: Pulmonary effort is normal. No respiratory distress. Breath sounds: Normal breath sounds. No stridor. No wheezing, rhonchi or rales. Chest:      Chest wall: No tenderness. Abdominal:      General: Bowel sounds are normal. There is no distension. Palpations: Abdomen is soft. There is no mass. Tenderness: There is no abdominal tenderness. There is no right CVA tenderness, left CVA tenderness, guarding or rebound. Hernia: No hernia is present. Musculoskeletal:         General: No swelling, tenderness or signs of injury. Normal range of motion. Cervical back: Normal range of motion. No rigidity or tenderness. Right lower leg: No edema. Left lower leg: No edema. Right foot: Normal range of motion. Left foot: Normal range of motion. Deformity present. Feet:      Right foot:      Skin integrity: Skin integrity normal. No erythema. Left foot:      Skin integrity: Skin integrity normal. No erythema.       Comments: L foot 4th and 5th toe deformity  Lymphadenopathy:      Cervical: No cervical adenopathy. Skin:     General: Skin is warm. Capillary Refill: Capillary refill takes less than 2 seconds. Coloration: Skin is not jaundiced or pale. Findings: No bruising, erythema, lesion or rash. Neurological:      General: No focal deficit present. Mental Status: He is alert and oriented to person, place, and time. Cranial Nerves: No cranial nerve deficit. Sensory: No sensory deficit. Motor: No weakness. Coordination: Coordination normal.      Gait: Gait normal.      Deep Tendon Reflexes: Reflexes normal.   Psychiatric:         Mood and Affect: Mood normal.         Behavior: Behavior normal.         Thought Content:  Thought content normal.         Judgment: Judgment normal.                Porfirio Bennett, [address] [facility]   12/8/2023  2:01 PM

## 2023-12-08 NOTE — ASSESSMENT & PLAN NOTE
Pt scheduled for low risk L foot 4th & 5th hammertoe correction on 01/05/24 under IV sedation. Pt's diabetes is controlled with medication and insulin therapy. On exam no sxs of active infection, BP stable. Reviewed medication list pt aware to stop Metformin 24 hrs prior to surgery and GLP-1 agonist Mounjaro 1 week prior to surgery. Pt may proceed with surgery as planned without any further work-up.

## 2023-12-22 ENCOUNTER — TELEPHONE (OUTPATIENT)
Age: 58
End: 2023-12-22

## 2023-12-22 DIAGNOSIS — J06.9 ACUTE URI: Primary | ICD-10-CM

## 2023-12-22 RX ORDER — AMOXICILLIN 875 MG/1
875 TABLET, COATED ORAL 2 TIMES DAILY
Qty: 14 TABLET | Refills: 0 | Status: SHIPPED | OUTPATIENT
Start: 2023-12-22 | End: 2023-12-29

## 2023-12-22 NOTE — TELEPHONE ENCOUNTER
"Pt called stating he has been having sinus pressure for the last week and his nose feels swollen.  He also stated his ears keep popping but there is no pain. I mentioned an appt with Dr. Obando but patient stated \"he usually calls something in for me when this happens.\"  Please advise.   "

## 2024-01-05 ENCOUNTER — ANESTHESIA EVENT (OUTPATIENT)
Dept: PERIOP | Facility: HOSPITAL | Age: 59
End: 2024-01-05
Payer: COMMERCIAL

## 2024-01-05 ENCOUNTER — HOSPITAL ENCOUNTER (OUTPATIENT)
Facility: HOSPITAL | Age: 59
Setting detail: OUTPATIENT SURGERY
Discharge: HOME | End: 2024-01-05
Attending: STUDENT IN AN ORGANIZED HEALTH CARE EDUCATION/TRAINING PROGRAM | Admitting: STUDENT IN AN ORGANIZED HEALTH CARE EDUCATION/TRAINING PROGRAM
Payer: COMMERCIAL

## 2024-01-05 ENCOUNTER — ANESTHESIA (OUTPATIENT)
Dept: PERIOP | Facility: HOSPITAL | Age: 59
End: 2024-01-05
Payer: COMMERCIAL

## 2024-01-05 ENCOUNTER — APPOINTMENT (OUTPATIENT)
Dept: RADIOLOGY | Facility: HOSPITAL | Age: 59
End: 2024-01-05
Payer: COMMERCIAL

## 2024-01-05 VITALS
BODY MASS INDEX: 27.93 KG/M2 | WEIGHT: 199.5 LBS | OXYGEN SATURATION: 96 % | TEMPERATURE: 97.1 F | SYSTOLIC BLOOD PRESSURE: 120 MMHG | RESPIRATION RATE: 14 BRPM | HEART RATE: 62 BPM | HEIGHT: 71 IN | DIASTOLIC BLOOD PRESSURE: 67 MMHG

## 2024-01-05 LAB — GLUCOSE SERPL-MCNC: 121 MG/DL (ref 65–140)

## 2024-01-05 PROCEDURE — 82948 REAGENT STRIP/BLOOD GLUCOSE: CPT

## 2024-01-05 PROCEDURE — 73620 X-RAY EXAM OF FOOT: CPT

## 2024-01-05 RX ORDER — ONDANSETRON 2 MG/ML
INJECTION INTRAMUSCULAR; INTRAVENOUS AS NEEDED
Status: DISCONTINUED | OUTPATIENT
Start: 2024-01-05 | End: 2024-01-05

## 2024-01-05 RX ORDER — ACETAMINOPHEN 325 MG/1
650 TABLET ORAL EVERY 4 HOURS PRN
Status: CANCELLED | OUTPATIENT
Start: 2024-01-05

## 2024-01-05 RX ORDER — ONDANSETRON 2 MG/ML
4 INJECTION INTRAMUSCULAR; INTRAVENOUS ONCE AS NEEDED
Status: DISCONTINUED | OUTPATIENT
Start: 2024-01-05 | End: 2024-01-05 | Stop reason: HOSPADM

## 2024-01-05 RX ORDER — PHENYLEPHRINE HCL IN 0.9% NACL 1 MG/10 ML
SYRINGE (ML) INTRAVENOUS AS NEEDED
Status: DISCONTINUED | OUTPATIENT
Start: 2024-01-05 | End: 2024-01-05

## 2024-01-05 RX ORDER — FENTANYL CITRATE 50 UG/ML
INJECTION, SOLUTION INTRAMUSCULAR; INTRAVENOUS AS NEEDED
Status: DISCONTINUED | OUTPATIENT
Start: 2024-01-05 | End: 2024-01-05

## 2024-01-05 RX ORDER — OXYCODONE HYDROCHLORIDE AND ACETAMINOPHEN 5; 325 MG/1; MG/1
1 TABLET ORAL EVERY 4 HOURS PRN
Status: CANCELLED | OUTPATIENT
Start: 2024-01-05

## 2024-01-05 RX ORDER — LIDOCAINE HYDROCHLORIDE 20 MG/ML
INJECTION, SOLUTION EPIDURAL; INFILTRATION; INTRACAUDAL; PERINEURAL AS NEEDED
Status: DISCONTINUED | OUTPATIENT
Start: 2024-01-05 | End: 2024-01-05

## 2024-01-05 RX ORDER — HYDROMORPHONE HCL/PF 1 MG/ML
0.5 SYRINGE (ML) INJECTION
Status: DISCONTINUED | OUTPATIENT
Start: 2024-01-05 | End: 2024-01-05 | Stop reason: HOSPADM

## 2024-01-05 RX ORDER — SODIUM CHLORIDE, SODIUM LACTATE, POTASSIUM CHLORIDE, CALCIUM CHLORIDE 600; 310; 30; 20 MG/100ML; MG/100ML; MG/100ML; MG/100ML
INJECTION, SOLUTION INTRAVENOUS CONTINUOUS PRN
Status: DISCONTINUED | OUTPATIENT
Start: 2024-01-05 | End: 2024-01-05

## 2024-01-05 RX ORDER — DIPHENHYDRAMINE HYDROCHLORIDE 50 MG/ML
12.5 INJECTION INTRAMUSCULAR; INTRAVENOUS ONCE AS NEEDED
Status: DISCONTINUED | OUTPATIENT
Start: 2024-01-05 | End: 2024-01-05 | Stop reason: HOSPADM

## 2024-01-05 RX ORDER — LIDOCAINE HYDROCHLORIDE 10 MG/ML
INJECTION, SOLUTION EPIDURAL; INFILTRATION; INTRACAUDAL; PERINEURAL AS NEEDED
Status: DISCONTINUED | OUTPATIENT
Start: 2024-01-05 | End: 2024-01-05 | Stop reason: HOSPADM

## 2024-01-05 RX ORDER — DEXAMETHASONE SODIUM PHOSPHATE 10 MG/ML
INJECTION, SOLUTION INTRAMUSCULAR; INTRAVENOUS AS NEEDED
Status: DISCONTINUED | OUTPATIENT
Start: 2024-01-05 | End: 2024-01-05

## 2024-01-05 RX ORDER — CEFAZOLIN SODIUM 2 G/50ML
2000 SOLUTION INTRAVENOUS ONCE
Status: COMPLETED | OUTPATIENT
Start: 2024-01-05 | End: 2024-01-05

## 2024-01-05 RX ORDER — MAGNESIUM HYDROXIDE 1200 MG/15ML
LIQUID ORAL AS NEEDED
Status: DISCONTINUED | OUTPATIENT
Start: 2024-01-05 | End: 2024-01-05 | Stop reason: HOSPADM

## 2024-01-05 RX ORDER — FENTANYL CITRATE/PF 50 MCG/ML
50 SYRINGE (ML) INJECTION
Status: DISCONTINUED | OUTPATIENT
Start: 2024-01-05 | End: 2024-01-05 | Stop reason: HOSPADM

## 2024-01-05 RX ORDER — METOCLOPRAMIDE HYDROCHLORIDE 5 MG/ML
10 INJECTION INTRAMUSCULAR; INTRAVENOUS ONCE AS NEEDED
Status: DISCONTINUED | OUTPATIENT
Start: 2024-01-05 | End: 2024-01-05 | Stop reason: HOSPADM

## 2024-01-05 RX ORDER — PROPOFOL 10 MG/ML
INJECTION, EMULSION INTRAVENOUS AS NEEDED
Status: DISCONTINUED | OUTPATIENT
Start: 2024-01-05 | End: 2024-01-05

## 2024-01-05 RX ORDER — PROPOFOL 10 MG/ML
INJECTION, EMULSION INTRAVENOUS CONTINUOUS PRN
Status: DISCONTINUED | OUTPATIENT
Start: 2024-01-05 | End: 2024-01-05

## 2024-01-05 RX ORDER — MIDAZOLAM HYDROCHLORIDE 2 MG/2ML
INJECTION, SOLUTION INTRAMUSCULAR; INTRAVENOUS AS NEEDED
Status: DISCONTINUED | OUTPATIENT
Start: 2024-01-05 | End: 2024-01-05

## 2024-01-05 RX ADMIN — SODIUM CHLORIDE, SODIUM LACTATE, POTASSIUM CHLORIDE, CALCIUM CHLORIDE: 600; 310; 30; 20 INJECTION, SOLUTION INTRAVENOUS at 06:57

## 2024-01-05 RX ADMIN — Medication 100 MCG: at 08:07

## 2024-01-05 RX ADMIN — Medication 100 MCG: at 08:21

## 2024-01-05 RX ADMIN — Medication 100 MCG: at 08:18

## 2024-01-05 RX ADMIN — LIDOCAINE HYDROCHLORIDE 100 MG: 20 INJECTION, SOLUTION EPIDURAL; INFILTRATION; INTRACAUDAL; PERINEURAL at 07:34

## 2024-01-05 RX ADMIN — SODIUM CHLORIDE, SODIUM LACTATE, POTASSIUM CHLORIDE, CALCIUM CHLORIDE: 600; 310; 30; 20 INJECTION, SOLUTION INTRAVENOUS at 08:18

## 2024-01-05 RX ADMIN — PROPOFOL 90 MCG/KG/MIN: 10 INJECTION, EMULSION INTRAVENOUS at 07:51

## 2024-01-05 RX ADMIN — CEFAZOLIN SODIUM 2000 MG: 2 SOLUTION INTRAVENOUS at 07:27

## 2024-01-05 RX ADMIN — ONDANSETRON 4 MG: 2 INJECTION INTRAMUSCULAR; INTRAVENOUS at 07:45

## 2024-01-05 RX ADMIN — Medication 100 MCG: at 08:29

## 2024-01-05 RX ADMIN — FENTANYL CITRATE 50 MCG: 50 INJECTION, SOLUTION INTRAMUSCULAR; INTRAVENOUS at 07:34

## 2024-01-05 RX ADMIN — FENTANYL CITRATE 50 MCG: 50 INJECTION, SOLUTION INTRAMUSCULAR; INTRAVENOUS at 07:31

## 2024-01-05 RX ADMIN — DEXAMETHASONE SODIUM PHOSPHATE 10 MG: 10 INJECTION, SOLUTION INTRAMUSCULAR; INTRAVENOUS at 07:45

## 2024-01-05 RX ADMIN — Medication 100 MCG: at 08:23

## 2024-01-05 RX ADMIN — Medication 100 MCG: at 08:12

## 2024-01-05 RX ADMIN — Medication 100 MCG: at 07:54

## 2024-01-05 RX ADMIN — Medication 100 MCG: at 08:04

## 2024-01-05 RX ADMIN — MIDAZOLAM HYDROCHLORIDE 2 MG: 2 INJECTION, SOLUTION INTRAMUSCULAR; INTRAVENOUS at 07:25

## 2024-01-05 RX ADMIN — PROPOFOL 70 MG: 10 INJECTION, EMULSION INTRAVENOUS at 07:34

## 2024-01-05 RX ADMIN — PROPOFOL 100 MCG/KG/MIN: 10 INJECTION, EMULSION INTRAVENOUS at 07:34

## 2024-01-05 RX ADMIN — Medication 100 MCG: at 08:27

## 2024-01-05 NOTE — INTERVAL H&P NOTE
H&P reviewed. After examining the patient I find no changes in the patients condition since the H&P had been written.    Vitals:    01/05/24 0702   BP: 109/65   Pulse: 62   Resp: 16   SpO2: 97%

## 2024-01-05 NOTE — ANESTHESIA PREPROCEDURE EVALUATION
Procedure:  4TH AND 5TH HAMMERTOE CORRECTION (Left: Foot)    Relevant Problems   CARDIO   (+) Essential hypertension   (+) Mixed hyperlipidemia      ENDO   (+) Type 2 diabetes mellitus without complication, with long-term current use of insulin (HCC)      GI/HEPATIC   (+) GERD (gastroesophageal reflux disease)      MUSCULOSKELETAL   (+) DDD (degenerative disc disease), lumbar   (+) Lumbar spondylosis      NEURO/PSYCH   (+) Chronic pain syndrome   (+) PIERRE (generalized anxiety disorder)      PULMONARY   (+) EILEEN (obstructive sleep apnea)        Physical Exam    Airway    Mallampati score: II  TM Distance: >3 FB  Neck ROM: full     Dental   No notable dental hx     Cardiovascular  Cardiovascular exam normal    Pulmonary  Pulmonary exam normal     Other Findings        Anesthesia Plan  ASA Score- 2     Anesthesia Type- IV sedation with anesthesia with ASA Monitors.         Additional Monitors:     Airway Plan:            Plan Factors-Exercise tolerance (METS): >4 METS.    Chart reviewed. EKG reviewed.  Existing labs reviewed. Patient summary reviewed.    Patient is not a current smoker.      Obstructive sleep apnea risk education given perioperatively.        Induction-     Postoperative Plan-     Informed Consent- Anesthetic plan and risks discussed with patient.  I personally reviewed this patient with the CRNA. Discussed and agreed on the Anesthesia Plan with the CRNA..

## 2024-01-05 NOTE — DISCHARGE INSTR - AVS FIRST PAGE
Dr. Lamas  Post-Operative Instructions    1. Take your prescribed medication as directed.   2. Upon arrival at home, lie down and elevate your surgical foot on 2 pillows.  3. Stay off your feet as much as possible for the first 24-48 hours. This is when your feet first swell and may become painful. After 48 hours you may begin limited walking following these restrictions:      Weight-bearing as tolerated in surgical shoe     4. Drink large quantities of water. Consume no alcohol. Continue a well-balanced diet.  5. Report any unusual discomfort or fever to this office.  6. A limited amount of discomfort and swelling is to be expected. In some cases the skin may take on a bruised appearance. The surgical cleansing solution that was applied to your foot prior to the operation is dark in color and the operation site may appear to be oozing when it actually is not.  7. A slight amount of blood is to be expected, and is no cause for alarm. Do not remove the dressings. If there is active bleeding and if the bleeding persists, add additional gauze to the bandage, apply direct pressure, elevate your feet and call this office.  8. Do not get the dressings wet. As regular bathing may be inconvenient, sponge baths are recommended.   9. When anesthesia wears off and if any discomfort should be present, apply an ice pack directly over the operated area for 15 minute intervals for several hours or until the pain leaves. (USE IN EXCESS OF 15 MINUTES COULD CAUSE FROSTBITE). Do not use hot water bags or electric pads. A convenient icepack can be made by placing ice cubes in a plastic bag and covering this with a towel.  10. Take over-the-counter laxative for constipation, this is common with use of narcotic medications.

## 2024-01-05 NOTE — ANESTHESIA POSTPROCEDURE EVALUATION
Post-Op Assessment Note    CV Status:  Stable  Pain Score: 0    Pain management: adequate       Mental Status:  Alert and awake   Hydration Status:  Euvolemic   PONV Controlled:  Controlled   Airway Patency:  Patent     Post Op Vitals Reviewed: Yes      Staff: Anesthesiologist               BP   95/52   Temp   97.4   Pulse  63   Resp   14   SpO2   99

## 2024-01-05 NOTE — OP NOTE
OPERATIVE REPORT - Podiatry  PATIENT NAME: Checo Felder    :  1965  MRN: [id number]  Pt Location:  OR ROOM 02    SURGERY DATE: 2024    Surgeons and Role:     * Andrae Lamas, RONAKM - Primary     * Marquis Ruiz, GAY - Assisting     * Adalid Castro, GAY - Assisting    Pre-op Diagnosis:  Other hammer toe(s) (acquired), left foot [M20.42]    Post-Op Diagnosis Codes:     * Other hammer toe(s) (acquired), left foot [M20.42]    Procedure(s) (LRB):  4TH AND 5TH HAMMERTOE CORRECTION (Left)    Specimen(s):  * No specimens in log *    Estimated Blood Loss:   Minimal    Drains:  * No LDAs found *    Anesthesia Type:   IV Sedation with Anesthesia with 10 ml of 1% Lidocaine and 0.5% Bupivacaine in a 1:1 mixture    Hemostasis:  Pneumatic ankle tourniquet set at 250 mmHg for 56 mins  Direct compression, electrocautery    Materials:  4-0 vicryl  4-0 prolene    Injectables:  None    Operative Findings:  Consistent with diagnosis    Complications:   None    Procedure and Technique:     Under mild sedation, the patient was brought into the operating room and placed on the operating room table in the supine position. IV sedation was achieved by anesthesia team and a universal timeout was performed where all parties are in agreement of correct patient, correct procedure and correct site. A pneumatic tourniquet was then placed over the patient's left lower extremity with ample padding. A digital block was performed consisting of 10 ml of local anesthetic. The foot was then prepped and draped in the usual aseptic manner. An esmarch bandage was used to exsangunate the foot and the pneumatic tourniquet was then inflated to 250 mmHg.    4th digit arthroplasty:  Attention was then directed to the left 4th digit. Hammertoe deformity was present. A  dorsal linear incision was made from the metatarsal-phalangeal joint to the proximal interphalangeal joint. The incision was then deepened via sharp dissection through the  subcutaneous tissues, ligating all venous vessels as necessary. Dissection was carried to the level of the EDL tendon. The tendon was then transected at that level. The PIPJ was then exposed and the medial and lateral collateral ligaments were incised to expose the head of the proximal phalanx. By use of sagittal saw, the head of the proximal phalanx was resected on 4th digit. Push test showed that the contracture was resolved.     5th toe arthroplasty:  Attention was then directed to the left 5th digit. Hammertoe rotational deformity was present. A  dorsal ellipse incision was made across the PIPJ, distal-medial to proximal lateral. The incision was then deepened via sharp dissection through the subcutaneous tissues, ligating all venous vessels as necessary. Dissection was carried to the level of the EDL tendon. The tendon was then transected at that level. The PIPJ was then exposed and the medial and lateral collateral ligaments were incised to expose the head of the proximal phalanx. By use of sagittal saw, the head of the proximal phalanx was resected on 5th digit. Additional the medial aspect of the distal phalanx was rongeured  and rasped to remove bone spur. Push test showed that the contracture was resolved.     The surgical incisions were irrigated with copious amounts of normal sterile saline. The periosteal and capsular structures and tendon structures were reapproximated using 4-0 vicryl. Subcutaneous closure was obtained utilizing 4-0 vicryl. Skin edges were reapproximated and closure was obtained utilizing 4-0 prolene. The foot was then cleansed and dried. A postoperative injection consisting of 10 ml of 1% Lidocaine was performed. The incision site was dressed with betadine soaked adaptic, gauze. This was then covered with a Nima and an ACE wrap.     The tourniquet was deflated at approximately 56 min and normal hyperemic response was noted to all digits. The patient tolerated the procedure and  "anesthesia well without immediate complications and transferred to PACU with vital signs stable.     Dr. Lamas was present during the entire procedure and participated in all key aspects.    SIGNATURE: Adalid Castro DPM  DATE: January 5, 2024  TIME: 8:39 AM      Portions of the record may have been created with voice recognition software. Occasional wrong word or \"sound a like\" substitutions may have occurred due to the inherent limitations of voice recognition software. Read the chart carefully and recognize, using context, where substitutions have occurred.        "

## 2024-01-05 NOTE — DISCHARGE SUMMARY
Discharge Summary Outpatient Procedure Podiatry -   Checo Felder 58 y.o. male MRN: [id number]  Unit/Bed#: OR POOL Encounter: [id number]    Admission Date: 1/5/2024     Admitting Diagnosis: Other hammer toe(s) (acquired), left foot [M20.42]    Discharge Diagnosis: same    Procedures Performed: 4TH AND 5TH HAMMERTOE CORRECTION: 00564 (CPT®)    Complications: none    Condition at Discharge: stable    Discharge instructions/Information to patient and family:   See after visit summary for information provided to patient and family.      Provisions for Follow-Up Care/Important appointments:  See after visit summary for information related to follow-up care and any pertinent home health orders.      Discharge Medications:  See after visit summary for reconciled discharge medications provided to patient and family.

## 2024-01-15 DIAGNOSIS — M54.16 RADICULOPATHY, LUMBAR REGION: ICD-10-CM

## 2024-01-15 RX ORDER — GABAPENTIN 300 MG/1
300 CAPSULE ORAL 2 TIMES DAILY
Qty: 60 CAPSULE | Refills: 2 | Status: SHIPPED | OUTPATIENT
Start: 2024-01-15

## 2024-01-25 ENCOUNTER — TELEPHONE (OUTPATIENT)
Dept: FAMILY MEDICINE CLINIC | Facility: CLINIC | Age: 59
End: 2024-01-25

## 2024-01-25 NOTE — TELEPHONE ENCOUNTER
Pt called upset stating we never called him back regarding billing. I advise him as much as I can about the bill. Advised him to call his insurance again and ask why would they not cover the radiology part of the screening. He proceeds to state his is going to find him another physician. I apologized to him about him feeling that way. He then hung up

## 2024-01-30 ENCOUNTER — TELEPHONE (OUTPATIENT)
Dept: OTHER | Facility: OTHER | Age: 59
End: 2024-01-30

## 2024-01-30 DIAGNOSIS — F17.210 SMOKING GREATER THAN 20 PACK YEARS: Primary | ICD-10-CM

## 2024-01-30 NOTE — TELEPHONE ENCOUNTER
Janelle from [facility] stated that Lung screening Dx Z720 code is not covered by insurance. The code has to be changed. Janelle was provided office fax number to fax a request to the office to follow up.

## 2024-02-05 NOTE — TELEPHONE ENCOUNTER
Changed script diagnosis and asked billing to resubmit to patient's insurance with new diagnosis.

## 2024-03-19 DIAGNOSIS — M54.16 RADICULOPATHY, LUMBAR REGION: ICD-10-CM

## 2024-03-19 RX ORDER — MELOXICAM 15 MG/1
15 TABLET ORAL DAILY
Qty: 30 TABLET | Refills: 0 | Status: SHIPPED | OUTPATIENT
Start: 2024-03-19

## 2024-04-16 NOTE — H&P (VIEW-ONLY)
[facility] ~ RN CASE MANAGER NOTE    Patient seen today for   Chief Complaint   Patient presents with    Nursing follow-up       Focus of care for today's visit includes the following topics: Medication Management  and Disease process education    Visit Vitals  /74 (BP Location: LUE - Left upper extremity, Patient Position: Sitting, Cuff Size: Regular)   Pulse (!) 48   Temp 98 °F (36.7 °C) (Temporal)   Resp 16   Ht 5' 6\" (1.676 m)   Wt 69.7 kg (153 lb 11.2 oz)   SpO2 97%   BMI 24.81 kg/m²       ALLERGIES:   Allergen Reactions    Penicillins HIVES, RASH and SWELLING     Other reaction(s): Weal  Other reaction(s): Weal      Dronedarone DIARRHEA       Safety Concerns: fall risk, high risk medications, and carpeted floors    Physical Assessment:  Pain:  Patient has no complaints of pain or discomfort   HEENT:  Trouble swallowing   Lungs:  Normal  Cardiac: Heart rate: HR Regular rate and rhythm Edema: pitting edema present bilateral lower extremities Pedal pulses: palpable. Encouraged continued leg elevation, compression and activity (pt walks throughout his building with the walker).  : Alert orientated x3  GI:  No gastrointestinal complaints and Good appetite  Neuro: Alert orientated x3  Muscular: Wheelchair and Walker unsteady gait   Diabetic:  no    Diet: General, Low sodium, and Heart Healthy. Dysphagia diet- solids are soft and bite-sized. Liquids are thickened  Integumentary: YES Patient has Altered skin integrity on the Right side of the body located on the  buttock   Yes wound assessment includes:Yes wound assessment includes: Barrier cream present - no redness, warmth or pain. Skin is intact.      INR monitoring Required : No         Assessment finding: Pt seen for Palliative RN f/u visit. Medications reviewed- pt is compliant with medication regimen. No recent falls, bleeding or bruising. BLE edema (+2). No cough/SOB/orthopnea. Lungs sounds unremarkable. Poor circulation in bilateral legs  Checo Felder 1965 male MRN: [id number]        ASSESSMENT/PLAN  Problem List Items Addressed This Visit          Other    Pre-operative clearance - Primary     Pt scheduled for low risk L foot 4th & 5th hammertoe correction on 01/05/24 under IV sedation. Pt's diabetes is controlled with medication and insulin therapy. On exam no sxs of active infection, BP stable. Reviewed medication list pt aware to stop Metformin 24 hrs prior to surgery and GLP-1 agonist Mounjaro 1 week prior to surgery. Pt may proceed with surgery as planned without any further work-up.            High Risk Surgery: no  CAD: no  CHF: no  CVD: no  DM2 on insulin: yes  Cr>2: no        Checo Felder is undergoing a Low Risk surgery. He is at Low Risk for major adverse cardiac event (MACE). He may proceed with surgery as planned without further workup.    SUBJECTIVE  CC: Pre-op Exam      HPI:  Checo Felder is a 58 y.o. male who is planning to undergo 4TH AND 5TH HAMMERTOE CORRECTION  under IV sedation by Andrae Lamas on 01/05/24.  Patient has not had complications with anesthesia in the past.  Functional status: Independent    Review of Systems   Constitutional:  Negative for activity change, appetite change, chills, diaphoresis, fatigue, fever and unexpected weight change.   HENT:  Negative for congestion, dental problem, drooling, ear discharge, ear pain, facial swelling, hearing loss, mouth sores, nosebleeds, postnasal drip, rhinorrhea, sinus pressure, sinus pain, sneezing, sore throat, tinnitus, trouble swallowing and voice change.    Eyes:  Negative for photophobia, pain, discharge, redness, itching and visual disturbance.   Respiratory:  Negative for apnea, cough, choking, chest tightness, shortness of breath, wheezing and stridor.    Cardiovascular:  Negative for chest pain, palpitations and leg swelling.   Gastrointestinal:  Negative for abdominal distention, abdominal pain, anal bleeding, blood in stool, constipation, diarrhea, nausea  and vomiting.   Endocrine: Negative for cold intolerance, heat intolerance, polydipsia, polyphagia and polyuria.   Genitourinary:  Negative for decreased urine volume, difficulty urinating, dysuria, flank pain, frequency, hematuria, penile discharge, scrotal swelling, testicular pain and urgency.   Musculoskeletal:  Negative for arthralgias, back pain, gait problem, joint swelling, myalgias, neck pain and neck stiffness.   Skin:  Negative for color change, rash and wound.   Allergic/Immunologic: Positive for environmental allergies. Negative for food allergies and immunocompromised state.   Neurological:  Negative for dizziness, tremors, seizures, syncope, facial asymmetry, weakness, light-headedness, numbness and headaches.   Hematological:  Negative for adenopathy. Does not bruise/bleed easily.   Psychiatric/Behavioral:  Negative for agitation, behavioral problems, confusion, decreased concentration, dysphoric mood, hallucinations, self-injury, sleep disturbance and suicidal ideas. The patient is not nervous/anxious and is not hyperactive.    All other systems reviewed and are negative.        Historical Information   The patient history was reviewed as follows:    Past Medical History:   Diagnosis Date    Allergic     Anxiety     Diabetes mellitus (HCC)     GERD (gastroesophageal reflux disease)     Hyperlipidemia     Hypertension     Sleep apnea     does not use CPAP anymore     Past Surgical History:   Procedure Laterality Date    ASD REPAIR      COLONOSCOPY  01/25/2019    Due 1/2024    HERNIA REPAIR      rpr umbil johny reduc >5 yr    PSA,SCREENING (HISTORICAL)  01/28/2019    WISDOM TOOTH EXTRACTION       Family History   Problem Relation Age of Onset    Diabetes Mother     Hypertension Mother     Tuberculosis Father     Diabetes Maternal Grandfather     Alcohol abuse Brother     Substance Abuse Brother     COPD Brother       Social History       Medications:     Current Outpatient Medications:     Baqsimi Two  causing blue-shirin discoloration of feet and they are cold to the touch. No pain, warmth or tenderness in legs. Per family, pt's legs get even more blue/purple at the end of the day from sitting and they have noticed it happening more over the last 2-3 weeks. They try to keep his legs elevated and use a leg pump/massager to help with circulation. [facility] notified. Will f/u with any orders/recommendations.  Pt has a Cardiology appt scheduled for 5/6/24.       Education:   Education was provided to: Patient, Spouse, Caregiver , and Daughter   Education was provided: verbal    Response to teaching: Verbalized understanding {  Barrier to learning : no barriers apparent at this time     Topics educated on during visit:  How, to whom and when to report changes in condition, Anticoagulation Management  How, when, and who to report if you show any new signs of bruising, or bleeding., Instructed any fall including a head injury or uncontrollable bleeding needs to be evaluated by ER, Do not take any new over the counter medication or vitamins without instruction from PCP or team, Take medication as directed by medical team, Report bleeding from nose or gums, Report any falls, and Monitor stool and urine, report any changes in color, Diet education  Heart Healthy diet (low sodium), High Fiber (important to avoid constipation), and Poor diet, educated on a ways to improve intake, use of protein shakes if appropriate , Fall Prevention reviewed Utilization of appropriate assistive device, Wait for assistance, when changing position, instruction given to go slow, stabilize self before moving, Turn on the lights, Wear appropriate shoes, and Use caution with medication that cause drowsiness or dizziness , Infection control including hand hygiene, Medication Management Medication reconciliation completed and Reviewed medication for disease process purpose, dose and frequency No concerns identified , and Skin Care Management How,  "Pack 3 MG/DOSE POWD, SPRAY 3MG INTO ONE NOSTRIL AS NEEDED (FOR SEVERE HYPOGLYCEMIA)., Disp: , Rfl:     cetirizine (ZyrTEC) 10 mg tablet, Take 10 mg by mouth daily, Disp: , Rfl:     citalopram (CeleXA) 20 mg tablet, TAKE ONE TABLET BY MOUTH EVERY DAY, Disp: 90 tablet, Rfl: 1    Continuous Blood Gluc Sensor (Dexcom G7 Sensor), USE ONE SENSOR EVERY 10 DAYS, Disp: , Rfl:     insulin glargine (Lantus SoloStar) 100 units/mL injection pen, Inject 28 Units under the skin every 12 (twelve) hours, Disp: , Rfl:     irbesartan (AVAPRO) 300 mg tablet, Take 300 mg by mouth daily, Disp: , Rfl:     metFORMIN (GLUCOPHAGE) 1000 MG tablet, Take 1,000 mg by mouth 2 (two) times a day, Disp: , Rfl:     Mounjaro 5 MG/0.5ML SOPN, Inject 10 mg under the skin every 7 days, Disp: , Rfl:     rosuvastatin (CRESTOR) 20 MG tablet, TAKE ONE TABLET BY MOUTH EVERY MORNING, Disp: 90 tablet, Rfl: 1    fenofibrate (TRICOR) 145 mg tablet, Take 145 mg by mouth daily, Disp: , Rfl:     gabapentin (NEURONTIN) 300 mg capsule, TAKE ONE CAPSULE BY MOUTH TWICE A DAY, Disp: 60 capsule, Rfl: 3    meloxicam (MOBIC) 15 mg tablet, Take 1 tablet (15 mg total) by mouth daily (Patient not taking: Reported on 12/8/2023), Disp: 30 tablet, Rfl: 3  Allergies   Allergen Reactions    Dust Mite Extract Allergic Rhinitis    Molds & Smuts Allergic Rhinitis    Pollen Extract Allergic Rhinitis     Maple trees    Strawberry Extract - Food Allergy Rash       OBJECTIVE    Vitals:   Vitals:    12/08/23 1313   BP: 121/69   BP Location: Left arm   Patient Position: Sitting   Cuff Size: Adult   Pulse: 85   Temp: (!) 97.4 °F (36.3 °C)   TempSrc: Tympanic   SpO2: 96%   Weight: 93.4 kg (205 lb 12.8 oz)   Height: 5' 11\" (1.803 m)           Physical Exam  Vitals and nursing note reviewed.   Constitutional:       General: He is not in acute distress.     Appearance: Normal appearance. He is normal weight. He is not ill-appearing, toxic-appearing or diaphoretic.   IRVIN:      Head: " to who, and when to report skin changes , Healthy skin needs to be clean and well hydrated, Keep skin clean and dry as much as possible, Change positioning minimum of  every 2 hours, during normal awake hours , off load pressure points , Avoid friction and rubbing , clean skin after each soiling , and Do not massage reddened areas      Additional Services working with pt:  paid caregiver   DME  Patient has the following items in the home: Walker, Wheelchair, Hospital bed, scale, BP monitor, Thermometer, Pulse ox, and Oxygen  Patient needs DME equipment: No       Case Management/Collaboration of care:  Completed with: JAYSON Chen      Labs ordered: No   Med changes or refill needs: No      Normocephalic and atraumatic.      Right Ear: Tympanic membrane, ear canal and external ear normal. There is no impacted cerumen.      Left Ear: Tympanic membrane, ear canal and external ear normal. There is no impacted cerumen.      Nose: Nose normal. No congestion or rhinorrhea.      Mouth/Throat:      Mouth: Mucous membranes are moist.      Pharynx: Oropharynx is clear. No oropharyngeal exudate or posterior oropharyngeal erythema.   Eyes:      General:         Right eye: No discharge.         Left eye: No discharge.      Extraocular Movements: Extraocular movements intact.      Conjunctiva/sclera: Conjunctivae normal.      Pupils: Pupils are equal, round, and reactive to light.   Neck:      Vascular: No carotid bruit.   Cardiovascular:      Rate and Rhythm: Normal rate and regular rhythm.      Pulses: Normal pulses.      Heart sounds: Normal heart sounds. No murmur heard.     No friction rub. No gallop.   Pulmonary:      Effort: Pulmonary effort is normal. No respiratory distress.      Breath sounds: Normal breath sounds. No stridor. No wheezing, rhonchi or rales.   Chest:      Chest wall: No tenderness.   Abdominal:      General: Bowel sounds are normal. There is no distension.      Palpations: Abdomen is soft. There is no mass.      Tenderness: There is no abdominal tenderness. There is no right CVA tenderness, left CVA tenderness, guarding or rebound.      Hernia: No hernia is present.   Musculoskeletal:         General: No swelling, tenderness or signs of injury. Normal range of motion.      Cervical back: Normal range of motion. No rigidity or tenderness.      Right lower leg: No edema.      Left lower leg: No edema.      Right foot: Normal range of motion.      Left foot: Normal range of motion. Deformity present.   Feet:      Right foot:      Skin integrity: Skin integrity normal. No erythema.      Left foot:      Skin integrity: Skin integrity normal. No erythema.      Comments: L foot 4th and 5th toe  deformity  Lymphadenopathy:      Cervical: No cervical adenopathy.   Skin:     General: Skin is warm.      Capillary Refill: Capillary refill takes less than 2 seconds.      Coloration: Skin is not jaundiced or pale.      Findings: No bruising, erythema, lesion or rash.   Neurological:      General: No focal deficit present.      Mental Status: He is alert and oriented to person, place, and time.      Cranial Nerves: No cranial nerve deficit.      Sensory: No sensory deficit.      Motor: No weakness.      Coordination: Coordination normal.      Gait: Gait normal.      Deep Tendon Reflexes: Reflexes normal.   Psychiatric:         Mood and Affect: Mood normal.         Behavior: Behavior normal.         Thought Content: Thought content normal.         Judgment: Judgment normal.                EULA Coe  [facility]   12/8/2023  2:01 PM

## 2024-04-21 DIAGNOSIS — F41.9 ANXIETY: ICD-10-CM

## 2024-04-21 RX ORDER — CITALOPRAM 20 MG/1
TABLET ORAL
Qty: 90 TABLET | Refills: 1 | Status: SHIPPED | OUTPATIENT
Start: 2024-04-21

## 2024-04-22 ENCOUNTER — TELEPHONE (OUTPATIENT)
Age: 59
End: 2024-04-22

## 2024-04-22 DIAGNOSIS — K04.7 DENTAL INFECTION: Primary | ICD-10-CM

## 2024-04-22 RX ORDER — AMOXICILLIN 500 MG/1
500 CAPSULE ORAL 3 TIMES DAILY
Qty: 21 CAPSULE | Refills: 0 | Status: SHIPPED | OUTPATIENT
Start: 2024-04-22 | End: 2024-04-29

## 2024-04-22 NOTE — TELEPHONE ENCOUNTER
Patient is calling asking if Dr. Obando can send in an antibiotic for his face pain.  He states that he has an apt with dentists in a week to have a tooth pulled. Left side pain  He stated Dr. Collazo has done this for him in the past.      [facility]    Please call patient to let him know if the doctor sends something in.  Thank you

## 2024-05-21 DIAGNOSIS — E78.5 DYSLIPIDEMIA: ICD-10-CM

## 2024-05-22 RX ORDER — ROSUVASTATIN CALCIUM 20 MG/1
TABLET, COATED ORAL
Qty: 30 TABLET | Refills: 0 | Status: SHIPPED | OUTPATIENT
Start: 2024-05-22

## 2024-05-25 DIAGNOSIS — M54.16 RADICULOPATHY, LUMBAR REGION: ICD-10-CM

## 2024-05-27 RX ORDER — GABAPENTIN 300 MG/1
300 CAPSULE ORAL 2 TIMES DAILY
Qty: 60 CAPSULE | Refills: 0 | Status: SHIPPED | OUTPATIENT
Start: 2024-05-27

## 2024-07-01 DIAGNOSIS — E78.5 DYSLIPIDEMIA: ICD-10-CM

## 2024-07-01 DIAGNOSIS — M54.16 RADICULOPATHY, LUMBAR REGION: ICD-10-CM

## 2024-07-02 RX ORDER — GABAPENTIN 300 MG/1
300 CAPSULE ORAL 2 TIMES DAILY
Qty: 60 CAPSULE | Refills: 0 | Status: SHIPPED | OUTPATIENT
Start: 2024-07-02

## 2024-07-02 RX ORDER — ROSUVASTATIN CALCIUM 20 MG/1
TABLET, COATED ORAL
Qty: 30 TABLET | Refills: 0 | Status: SHIPPED | OUTPATIENT
Start: 2024-07-02

## 2024-08-02 DIAGNOSIS — M54.16 RADICULOPATHY, LUMBAR REGION: ICD-10-CM

## 2024-08-02 RX ORDER — GABAPENTIN 300 MG/1
300 CAPSULE ORAL 2 TIMES DAILY
Qty: 60 CAPSULE | Refills: 0 | Status: SHIPPED | OUTPATIENT
Start: 2024-08-02

## 2024-09-01 DIAGNOSIS — M54.16 RADICULOPATHY, LUMBAR REGION: ICD-10-CM

## 2024-09-02 RX ORDER — GABAPENTIN 300 MG/1
300 CAPSULE ORAL 2 TIMES DAILY
Qty: 60 CAPSULE | Refills: 5 | Status: SHIPPED | OUTPATIENT
Start: 2024-09-02

## 2024-09-06 ENCOUNTER — RA CDI HCC (OUTPATIENT)
Dept: OTHER | Facility: HOSPITAL | Age: 59
End: 2024-09-06

## 2024-09-06 PROBLEM — Z01.818 PRE-OPERATIVE CLEARANCE: Status: RESOLVED | Noted: 2023-12-08 | Resolved: 2024-09-06

## 2024-09-13 ENCOUNTER — OFFICE VISIT (OUTPATIENT)
Dept: FAMILY MEDICINE CLINIC | Facility: CLINIC | Age: 59
End: 2024-09-13
Payer: COMMERCIAL

## 2024-09-13 VITALS
OXYGEN SATURATION: 96 % | WEIGHT: 203 LBS | SYSTOLIC BLOOD PRESSURE: 118 MMHG | HEART RATE: 86 BPM | RESPIRATION RATE: 16 BRPM | HEIGHT: 71 IN | BODY MASS INDEX: 28.42 KG/M2 | DIASTOLIC BLOOD PRESSURE: 74 MMHG

## 2024-09-13 DIAGNOSIS — Z72.0 TOBACCO USE: ICD-10-CM

## 2024-09-13 DIAGNOSIS — F41.1 GAD (GENERALIZED ANXIETY DISORDER): ICD-10-CM

## 2024-09-13 DIAGNOSIS — E11.9 TYPE 2 DIABETES MELLITUS WITHOUT COMPLICATION, WITH LONG-TERM CURRENT USE OF INSULIN (HCC): ICD-10-CM

## 2024-09-13 DIAGNOSIS — K63.5 POLYP OF COLON, UNSPECIFIED PART OF COLON, UNSPECIFIED TYPE: ICD-10-CM

## 2024-09-13 DIAGNOSIS — Z23 ENCOUNTER FOR IMMUNIZATION: ICD-10-CM

## 2024-09-13 DIAGNOSIS — Z79.4 TYPE 2 DIABETES MELLITUS WITHOUT COMPLICATION, WITH LONG-TERM CURRENT USE OF INSULIN (HCC): ICD-10-CM

## 2024-09-13 DIAGNOSIS — Z12.5 PROSTATE CANCER SCREENING: ICD-10-CM

## 2024-09-13 DIAGNOSIS — E78.2 MIXED HYPERLIPIDEMIA: ICD-10-CM

## 2024-09-13 DIAGNOSIS — I10 ESSENTIAL HYPERTENSION: Primary | ICD-10-CM

## 2024-09-13 PROCEDURE — 99214 OFFICE O/P EST MOD 30 MIN: CPT | Performed by: FAMILY MEDICINE

## 2024-09-13 PROCEDURE — 90471 IMMUNIZATION ADMIN: CPT | Performed by: FAMILY MEDICINE

## 2024-09-13 PROCEDURE — 90673 RIV3 VACCINE NO PRESERV IM: CPT | Performed by: FAMILY MEDICINE

## 2024-09-13 NOTE — ASSESSMENT & PLAN NOTE
Last colonoscopy was Jan 2019 by Dr Short and patient had 4 polyps. Patient advised again to schedule follow-up colonoscopy.

## 2024-09-13 NOTE — ASSESSMENT & PLAN NOTE
A1C 6.8 in May 2024. Pt sees Dr Gallardo and next visit is in December 2024. Continue metformin 1000 mg twice a day, lantus 20 U twice a day, and Mounjaro 10 mg q week. Foot exam done today. Had eye exam in September 2023. Urine albumin/creatinine ratio done in May 2024.    Lab Results   Component Value Date    HGBA1C 6.8 (H) 05/10/2024

## 2024-09-13 NOTE — PROGRESS NOTES
Ambulatory Visit  Name: Checo Felder      : 1965      MRN: [id number]  Encounter Provider: Seb Obando MD  Encounter Date: 2024   Encounter department: [facility] MEDICINE    Assessment & Plan  Essential hypertension  Blood pressure has been good. Continue irbesartan 300 mg qd. Pt advised to continue low Na diet and to exercise on a regular basis.     Orders:    CBC and differential; Future    UA (URINE) with reflex to Scope; Future    Mixed hyperlipidemia  LDL 74 and LFTs normal in May 2024. Continue current meds. Advised pt to follow a low cholesterol diet and to exercise on a regular basis.          Type 2 diabetes mellitus without complication, with long-term current use of insulin (HCC)  A1C 6.8 in May 2024. Pt sees Dr Gallardo and next visit is in 2024. Continue metformin 1000 mg twice a day, lantus 20 U twice a day, and Mounjaro 10 mg q week. Foot exam done today. Had eye exam in 2023. Urine albumin/creatinine ratio done in May 2024.    Lab Results   Component Value Date    HGBA1C 6.8 (H) 05/10/2024            Polyp of colon, unspecified part of colon, unspecified type  Last colonoscopy was 2019 by Dr Short and patient had 4 polyps. Patient advised again to schedule follow-up colonoscopy.          PIERRE (generalized anxiety disorder)  Stress level has been ok. Continue citalopram 20 mg qd.          Tobacco use  Pt still smokes 1/2 PPD. Not ready to quit. LDCT was normal in 2023.         Encounter for immunization    Orders:    influenza vaccine, recombinant, PF, 0.5 mL IM (Flublok)    Prostate cancer screening    Orders:    PSA, Total Screen; Future      Depression Screening and Follow-up Plan: Patient was screened for depression during today's encounter. They screened negative with a PHQ-2 score of 0.        History of Present Illness     Patient here for follow-up Hypertension, Hyperlipidemia, DM, Colon Polyps, Generalized Anxiety Disorder,  Tobacco use. Patient doing ok. No chest pain or shortness of breath. No headaches. No abdominal pain. Blood pressure has been good. Sugars have been good and has CGM. Sees Dr Gallardo and next appointment is in December 2024. Needs to schedule colonoscopy. No change in smoking. Stress level ok. No new complaints.       Review of Systems   Constitutional:  Negative for fatigue and unexpected weight change.   Eyes:  Negative for visual disturbance.   Respiratory:  Negative for chest tightness and shortness of breath.    Cardiovascular:  Negative for chest pain and palpitations.   Gastrointestinal:  Negative for abdominal pain, constipation, diarrhea, nausea and vomiting.   Endocrine: Negative for polydipsia, polyphagia and polyuria.   Genitourinary:  Negative for frequency.   Musculoskeletal:  Positive for arthralgias and back pain.   Skin:  Negative for rash and wound.   Neurological:  Negative for numbness.     Past Medical History:   Diagnosis Date    Allergic     Anxiety     Diabetes mellitus (HCC)     GERD (gastroesophageal reflux disease)     Hyperlipidemia     Hypertension     Pre-operative clearance 12/08/2023    Sleep apnea     does not use CPAP anymore     Past Surgical History:   Procedure Laterality Date    ASD REPAIR      COLONOSCOPY  01/25/2019    Due 1/2024    HERNIA REPAIR      rpr umbil johny reduc >5 yr    WA CORRECTION HAMMERTOE Left 1/5/2024    Procedure: 4TH AND 5TH HAMMERTOE CORRECTION;  Surgeon: Andrae Lamas DPM;  Location:  MAIN OR;  Service: Podiatry    PSA,SCREENING (HISTORICAL)  01/28/2019    WISDOM TOOTH EXTRACTION       Family History   Problem Relation Age of Onset    Diabetes Mother     Hypertension Mother     Tuberculosis Father     Diabetes Maternal Grandfather     Alcohol abuse Brother     Substance Abuse Brother     COPD Brother      Social History     Tobacco Use    Smoking status: Every Day     Current packs/day: 0.50     Average packs/day: 0.5 packs/day for 46.7 years  (23.3 ttl pk-yrs)     Types: Cigarettes     Start date: 1/1/1978    Smokeless tobacco: Never   Vaping Use    Vaping status: Never Used   Substance and Sexual Activity    Alcohol use: Yes     Alcohol/week: 4.0 standard drinks of alcohol     Types: 4 Shots of liquor per week     Comment: occ    Drug use: Never    Sexual activity: Not Currently     Partners: Female     Current Outpatient Medications on File Prior to Visit   Medication Sig    cetirizine (ZyrTEC) 10 mg tablet Take 10 mg by mouth daily    citalopram (CeleXA) 20 mg tablet TAKE ONE TABLET BY MOUTH EVERY DAY    Continuous Blood Gluc Sensor (Dexcom G7 Sensor) USE ONE SENSOR EVERY 10 DAYS    gabapentin (NEURONTIN) 300 mg capsule TAKE ONE CAPSULE BY MOUTH TWICE A DAY    insulin glargine (Lantus SoloStar) 100 units/mL injection pen Inject 19 Units under the skin every 12 (twelve) hours    irbesartan (AVAPRO) 300 mg tablet Take 300 mg by mouth daily    metFORMIN (GLUCOPHAGE) 1000 MG tablet Take 1,000 mg by mouth 2 (two) times a day    Mounjaro 5 MG/0.5ML SOPN Inject 10 mg under the skin every 7 days    rosuvastatin (CRESTOR) 20 MG tablet TAKE ONE TABLET BY MOUTH EVERY MORNING    [DISCONTINUED] Baqsimi Two Pack 3 MG/DOSE POWD SPRAY 3MG INTO ONE NOSTRIL AS NEEDED (FOR SEVERE HYPOGLYCEMIA). (Patient not taking: Reported on 9/13/2024)    [DISCONTINUED] fenofibrate (TRICOR) 145 mg tablet Take 145 mg by mouth daily    [DISCONTINUED] meloxicam (MOBIC) 15 mg tablet Take 1 tablet (15 mg total) by mouth daily     Allergies   Allergen Reactions    Dust Mite Extract Allergic Rhinitis    Molds & Smuts Allergic Rhinitis    Pollen Extract Allergic Rhinitis     Maple trees    Strawberry Extract - Food Allergy Rash     Immunization History   Administered Date(s) Administered    COVID-19 PFIZER VACCINE 0.3 ML IM 03/20/2021, 04/10/2021, 12/03/2021    INFLUENZA 12/22/2009, 12/20/2011, 12/18/2012, 11/18/2014, 12/06/2016, 12/09/2016, 11/07/2017, 12/18/2018, 01/06/2019, 10/25/2022     "Influenza, injectable, quadrivalent, preservative free 0.5 mL 09/15/2020, 10/06/2023    Influenza, recombinant, quadrivalent,injectable, preservative free 09/21/2021, 10/25/2022    Pneumococcal Polysaccharide PPV23 12/18/2018    Tdap 01/19/2021, 07/07/2022    influenza, injectable, quadrivalent 12/03/2019     Objective     /74 (BP Location: Left arm, Patient Position: Sitting, Cuff Size: Standard)   Pulse 86   Resp 16   Ht 5' 11\" (1.803 m)   Wt 92.1 kg (203 lb)   SpO2 96%   BMI 28.31 kg/m²     Physical Exam  Vitals and nursing note reviewed.   Constitutional:       Appearance: Normal appearance. He is normal weight.   Neck:      Vascular: No carotid bruit.   Cardiovascular:      Rate and Rhythm: Normal rate and regular rhythm.      Pulses: no weak pulses.           Dorsalis pedis pulses are 2+ on the right side and 2+ on the left side.        Posterior tibial pulses are 2+ on the right side and 2+ on the left side.      Heart sounds: Normal heart sounds. No murmur heard.  Pulmonary:      Effort: Pulmonary effort is normal.      Breath sounds: Normal breath sounds. No wheezing.   Musculoskeletal:      Cervical back: Normal range of motion and neck supple. No muscular tenderness.      Right lower leg: No edema.      Left lower leg: No edema.   Feet:      Right foot:      Skin integrity: Callus present. No ulcer, skin breakdown, erythema, warmth or dry skin.      Left foot:      Skin integrity: Callus present. No ulcer, skin breakdown, erythema, warmth or dry skin.   Lymphadenopathy:      Cervical: No cervical adenopathy.   Neurological:      Mental Status: He is alert.   Psychiatric:         Mood and Affect: Mood normal.         Behavior: Behavior normal.         Thought Content: Thought content normal.         Judgment: Judgment normal.     Patient's shoes and socks removed.    Right Foot/Ankle   Right Foot Inspection  Skin Exam: skin normal, skin intact, callus and callus. No dry skin, no warmth, no " erythema, no maceration, no abnormal color, no pre-ulcer and no ulcer.     Toe Exam: ROM and strength within normal limits. No swelling, no tenderness, erythema and  no right toe deformity    Sensory   Proprioception: intact  Monofilament testing: intact    Vascular  Capillary refills: < 3 seconds  The right DP pulse is 2+. The right PT pulse is 2+.     Left Foot/Ankle  Left Foot Inspection  Skin Exam: skin normal, skin intact and callus. No dry skin, no warmth, no erythema, no maceration, normal color, no pre-ulcer and no ulcer.     Toe Exam: ROM and strength within normal limits. No swelling, no tenderness, no erythema and no left toe deformity.     Sensory   Proprioception: intact  Monofilament testing: intact    Vascular  Capillary refills: < 3 seconds  The left DP pulse is 2+. The left PT pulse is 2+.     Assign Risk Category  No deformity present  No loss of protective sensation  No weak pulses  Risk: 0

## 2024-09-13 NOTE — ASSESSMENT & PLAN NOTE
Blood pressure has been good. Continue irbesartan 300 mg qd. Pt advised to continue low Na diet and to exercise on a regular basis.     Orders:    CBC and differential; Future    UA (URINE) with reflex to Scope; Future

## 2024-09-13 NOTE — ASSESSMENT & PLAN NOTE
LDL 74 and LFTs normal in May 2024. Continue current meds. Advised pt to follow a low cholesterol diet and to exercise on a regular basis.

## 2024-10-15 DIAGNOSIS — F41.9 ANXIETY: ICD-10-CM

## 2024-10-16 RX ORDER — CITALOPRAM HYDROBROMIDE 20 MG/1
TABLET ORAL
Qty: 90 TABLET | Refills: 1 | Status: SHIPPED | OUTPATIENT
Start: 2024-10-16

## 2024-10-25 ENCOUNTER — APPOINTMENT (OUTPATIENT)
Dept: LAB | Facility: MEDICAL CENTER | Age: 59
End: 2024-10-25
Payer: COMMERCIAL

## 2024-10-25 DIAGNOSIS — E11.9 TYPE 2 DIABETES MELLITUS WITHOUT COMPLICATION, WITH LONG-TERM CURRENT USE OF INSULIN (HCC): ICD-10-CM

## 2024-10-25 DIAGNOSIS — I10 ESSENTIAL HYPERTENSION: ICD-10-CM

## 2024-10-25 DIAGNOSIS — Z12.5 PROSTATE CANCER SCREENING: ICD-10-CM

## 2024-10-25 DIAGNOSIS — Z79.4 TYPE 2 DIABETES MELLITUS WITHOUT COMPLICATION, WITH LONG-TERM CURRENT USE OF INSULIN (HCC): ICD-10-CM

## 2024-10-25 LAB
ANION GAP SERPL CALCULATED.3IONS-SCNC: 8 MMOL/L (ref 4–13)
BACTERIA UR QL AUTO: NORMAL /HPF
BASOPHILS # BLD AUTO: 0.1 THOUSANDS/ΜL (ref 0–0.1)
BASOPHILS NFR BLD AUTO: 1 % (ref 0–1)
BILIRUB UR QL STRIP: NEGATIVE
BUN SERPL-MCNC: 15 MG/DL (ref 5–25)
CALCIUM SERPL-MCNC: 9 MG/DL (ref 8.4–10.2)
CHLORIDE SERPL-SCNC: 102 MMOL/L (ref 96–108)
CLARITY UR: CLEAR
CO2 SERPL-SCNC: 29 MMOL/L (ref 21–32)
COLOR UR: ABNORMAL
CREAT SERPL-MCNC: 0.79 MG/DL (ref 0.6–1.3)
EOSINOPHIL # BLD AUTO: 0.29 THOUSAND/ΜL (ref 0–0.61)
EOSINOPHIL NFR BLD AUTO: 4 % (ref 0–6)
ERYTHROCYTE [DISTWIDTH] IN BLOOD BY AUTOMATED COUNT: 13 % (ref 11.6–15.1)
EST. AVERAGE GLUCOSE BLD GHB EST-MCNC: 146 MG/DL
GFR SERPL CREATININE-BSD FRML MDRD: 97 ML/MIN/1.73SQ M
GLUCOSE P FAST SERPL-MCNC: 78 MG/DL (ref 65–99)
GLUCOSE UR STRIP-MCNC: ABNORMAL MG/DL
HBA1C MFR BLD: 6.7 %
HCT VFR BLD AUTO: 45.7 % (ref 36.5–49.3)
HGB BLD-MCNC: 14.2 G/DL (ref 12–17)
HGB UR QL STRIP.AUTO: NEGATIVE
IMM GRANULOCYTES # BLD AUTO: 0.03 THOUSAND/UL (ref 0–0.2)
IMM GRANULOCYTES NFR BLD AUTO: 0 % (ref 0–2)
KETONES UR STRIP-MCNC: NEGATIVE MG/DL
LEUKOCYTE ESTERASE UR QL STRIP: NEGATIVE
LYMPHOCYTES # BLD AUTO: 2.35 THOUSANDS/ΜL (ref 0.6–4.47)
LYMPHOCYTES NFR BLD AUTO: 31 % (ref 14–44)
MCH RBC QN AUTO: 27.8 PG (ref 26.8–34.3)
MCHC RBC AUTO-ENTMCNC: 31.1 G/DL (ref 31.4–37.4)
MCV RBC AUTO: 90 FL (ref 82–98)
MONOCYTES # BLD AUTO: 0.81 THOUSAND/ΜL (ref 0.17–1.22)
MONOCYTES NFR BLD AUTO: 11 % (ref 4–12)
NEUTROPHILS # BLD AUTO: 3.93 THOUSANDS/ΜL (ref 1.85–7.62)
NEUTS SEG NFR BLD AUTO: 53 % (ref 43–75)
NITRITE UR QL STRIP: NEGATIVE
NON-SQ EPI CELLS URNS QL MICRO: NORMAL /HPF
NRBC BLD AUTO-RTO: 0 /100 WBCS
PH UR STRIP.AUTO: 6.5 [PH]
PLATELET # BLD AUTO: 266 THOUSANDS/UL (ref 149–390)
PMV BLD AUTO: 10.4 FL (ref 8.9–12.7)
POTASSIUM SERPL-SCNC: 4.7 MMOL/L (ref 3.5–5.3)
PROT UR STRIP-MCNC: ABNORMAL MG/DL
PSA SERPL-MCNC: 0.74 NG/ML (ref 0–4)
RBC # BLD AUTO: 5.1 MILLION/UL (ref 3.88–5.62)
RBC #/AREA URNS AUTO: NORMAL /HPF
SODIUM SERPL-SCNC: 139 MMOL/L (ref 135–147)
SP GR UR STRIP.AUTO: 1.02 (ref 1–1.03)
TSH SERPL DL<=0.05 MIU/L-ACNC: 3.2 UIU/ML (ref 0.45–4.5)
UROBILINOGEN UR STRIP-ACNC: <2 MG/DL
WBC # BLD AUTO: 7.51 THOUSAND/UL (ref 4.31–10.16)
WBC #/AREA URNS AUTO: NORMAL /HPF

## 2024-10-25 PROCEDURE — 83036 HEMOGLOBIN GLYCOSYLATED A1C: CPT

## 2024-10-25 PROCEDURE — G0103 PSA SCREENING: HCPCS

## 2024-10-25 PROCEDURE — 85025 COMPLETE CBC W/AUTO DIFF WBC: CPT

## 2024-10-25 PROCEDURE — 80048 BASIC METABOLIC PNL TOTAL CA: CPT

## 2024-10-25 PROCEDURE — 84443 ASSAY THYROID STIM HORMONE: CPT

## 2024-10-25 PROCEDURE — 81001 URINALYSIS AUTO W/SCOPE: CPT

## 2024-10-25 PROCEDURE — 36415 COLL VENOUS BLD VENIPUNCTURE: CPT

## 2024-10-28 ENCOUNTER — TELEPHONE (OUTPATIENT)
Age: 59
End: 2024-10-28

## 2024-10-28 DIAGNOSIS — K08.89 TOOTHACHE: Primary | ICD-10-CM

## 2024-10-28 RX ORDER — AMOXICILLIN 500 MG/1
500 CAPSULE ORAL EVERY 8 HOURS SCHEDULED
Qty: 21 CAPSULE | Refills: 0 | Status: SHIPPED | OUTPATIENT
Start: 2024-10-28 | End: 2024-11-04

## 2024-10-28 NOTE — TELEPHONE ENCOUNTER
The patient called he has a tooth ache/infection and Dr Obando always calls in an antibiotic for him  [facility]     please call the patient when the medication is ordered so he can pick it up thank you

## 2024-10-31 ENCOUNTER — TELEPHONE (OUTPATIENT)
Age: 59
End: 2024-10-31

## 2024-10-31 NOTE — TELEPHONE ENCOUNTER
Patient calling to verify information a provider he saw for a procedure years ago on his back.  Provided patient with Dr. Jacob's information    [facility] [address], [place], [address] [phone]

## 2024-11-01 ENCOUNTER — TELEPHONE (OUTPATIENT)
Dept: PAIN MEDICINE | Facility: CLINIC | Age: 59
End: 2024-11-01

## 2024-11-01 NOTE — TELEPHONE ENCOUNTER
Caller: Curry    Doctor: Elle    Reason for call: patient had issues with his last insurance company he currently has [employer] I provided our office NPI,  he would like procedure codes from the last procedures he had from:     4/13/2021  5/04/2021  6/15/2021    6/29/2021    Consult is on 12/5/2024 he wants to be proactive I made him aware he would not need to wait for consult when Dr. Almeida would give best recommendation.     Call back#: [phone]

## 2024-11-06 NOTE — TELEPHONE ENCOUNTER
Lmom for patient with procedure codes-    Procedures on 4/13/21 and 5/4/21 were MBB's  Codes are 51051/08852    Procedure on 6/15/21 Left RFA and on 6/29/21 Right RFA  Codes for RFA's are: 51359/37331

## 2024-11-14 PROBLEM — M54.16 LUMBAR RADICULITIS: Status: RESOLVED | Noted: 2020-12-11 | Resolved: 2024-11-14

## 2024-11-14 PROBLEM — R53.83 FATIGUE: Status: RESOLVED | Noted: 2023-03-17 | Resolved: 2024-11-14

## 2024-11-14 PROBLEM — R05.3 CHRONIC COUGH: Status: RESOLVED | Noted: 2021-09-21 | Resolved: 2024-11-14

## 2024-11-14 PROBLEM — L08.9 SUPERFICIAL SKIN INFECTION: Status: RESOLVED | Noted: 2023-10-06 | Resolved: 2024-11-14

## 2024-11-15 ENCOUNTER — OFFICE VISIT (OUTPATIENT)
Dept: FAMILY MEDICINE CLINIC | Facility: CLINIC | Age: 59
End: 2024-11-15
Payer: COMMERCIAL

## 2024-11-15 VITALS
OXYGEN SATURATION: 97 % | DIASTOLIC BLOOD PRESSURE: 70 MMHG | WEIGHT: 202 LBS | HEART RATE: 80 BPM | RESPIRATION RATE: 16 BRPM | HEIGHT: 71 IN | BODY MASS INDEX: 28.28 KG/M2 | SYSTOLIC BLOOD PRESSURE: 110 MMHG

## 2024-11-15 DIAGNOSIS — E78.2 MIXED HYPERLIPIDEMIA: ICD-10-CM

## 2024-11-15 DIAGNOSIS — F41.1 GAD (GENERALIZED ANXIETY DISORDER): ICD-10-CM

## 2024-11-15 DIAGNOSIS — E11.9 TYPE 2 DIABETES MELLITUS WITHOUT COMPLICATION, WITH LONG-TERM CURRENT USE OF INSULIN (HCC): ICD-10-CM

## 2024-11-15 DIAGNOSIS — M54.16 RADICULOPATHY, LUMBAR REGION: Primary | ICD-10-CM

## 2024-11-15 DIAGNOSIS — Z79.4 TYPE 2 DIABETES MELLITUS WITHOUT COMPLICATION, WITH LONG-TERM CURRENT USE OF INSULIN (HCC): ICD-10-CM

## 2024-11-15 DIAGNOSIS — I10 ESSENTIAL HYPERTENSION: ICD-10-CM

## 2024-11-15 PROCEDURE — 99214 OFFICE O/P EST MOD 30 MIN: CPT | Performed by: FAMILY MEDICINE

## 2024-11-15 RX ORDER — PEN NEEDLE, DIABETIC 31 GX5/16"
NEEDLE, DISPOSABLE MISCELLANEOUS
COMMUNITY
Start: 2024-10-13

## 2024-11-15 RX ORDER — NAPROXEN 500 MG/1
500 TABLET ORAL 2 TIMES DAILY WITH MEALS
Qty: 30 TABLET | Refills: 0 | Status: SHIPPED | OUTPATIENT
Start: 2024-11-15

## 2024-11-15 NOTE — ASSESSMENT & PLAN NOTE
A1C 6.7 in October 2024. Pt sees Dr Gallardo and next visit is in December 2024. Continue metformin 1000 mg twice a day, lantus 20 U twice a day, and Mounjaro 10 mg q week. Foot exam done in September 2024. Had eye exam in September 2023. Urine albumin/creatinine ratio done in May 2024.    Lab Results   Component Value Date    HGBA1C 6.7 (H) 10/25/2024

## 2024-11-15 NOTE — PROGRESS NOTES
Name: Checo Felder      : 1965      MRN: [id number]  Encounter Provider: Seb Obando MD  Encounter Date: 11/15/2024   Encounter department: [facility]    Assessment & Plan  Radiculopathy, lumbar region  Patient has had increased pain and radiates down left leg. Patient to see Pain Management next week. Has had rhizotomy in the past.     Orders:    naproxen (NAPROSYN) 500 mg tablet; Take 1 tablet (500 mg total) by mouth 2 (two) times a day with meals    PIERRE (generalized anxiety disorder)  Patient has had increased irritability and decreased concentration for past few weeks. Started when back pain got worse. Patient to see Pain Management next week and will give naproxen 500 mg twice a day. Patient also to increase citalopram to 30 mg daily. Call if no better.        Essential hypertension  Blood pressure good. Continue irbesartan 300 mg qd. Pt advised to continue low Na diet and to exercise on a regular basis.          Mixed hyperlipidemia  LDL 74 and LFTs normal in May 2024. Continue current meds. Advised pt to follow a low cholesterol diet and to exercise on a regular basis.          Type 2 diabetes mellitus without complication, with long-term current use of insulin (HCC)  A1C 6.7 in 2024. Pt sees Dr Gallardo and next visit is in 2024. Continue metformin 1000 mg twice a day, lantus 20 U twice a day, and Mounjaro 10 mg q week. Foot exam done in 2024. Had eye exam in 2023. Urine albumin/creatinine ratio done in May 2024.    Lab Results   Component Value Date    HGBA1C 6.7 (H) 10/25/2024                   History of Present Illness     Patient here for 2-3 week history of increased anxiety and decreased concentration/focus. Had similar episode in the past. Also has increased irritability. Gets easily frustrated. Has had increased back pain as well. Going to see Pain Management next week. Pain radiates down left leg.       Review of Systems    Constitutional:  Negative for activity change, appetite change and fatigue.   Respiratory:  Negative for chest tightness and shortness of breath.    Cardiovascular:  Negative for chest pain and palpitations.   Gastrointestinal:  Negative for abdominal pain, diarrhea, nausea and vomiting.   Musculoskeletal:  Positive for arthralgias and back pain.   Neurological:  Negative for dizziness, tremors, light-headedness and headaches.   Psychiatric/Behavioral:  Positive for agitation, decreased concentration and sleep disturbance. Negative for dysphoric mood, self-injury and suicidal ideas. The patient is not nervous/anxious.      Past Medical History:   Diagnosis Date    Allergic     Anxiety     Diabetes mellitus (HCC)     GERD (gastroesophageal reflux disease)     Hyperlipidemia     Hypertension     Pre-operative clearance 12/08/2023    Sleep apnea     does not use CPAP anymore     Past Surgical History:   Procedure Laterality Date    ASD REPAIR      COLONOSCOPY  01/25/2019    Due 1/2024    HERNIA REPAIR      rpr umbil johny reduc >5 yr    [place] CORRECTION HAMMERTOE Left 1/5/2024    Procedure: 4TH AND 5TH HAMMERTOE CORRECTION;  Surgeon: Andrae Lamas DPM;  Location:  MAIN OR;  Service: Podiatry    PSA,SCREENING (HISTORICAL)  01/28/2019    WISDOM TOOTH EXTRACTION       Family History   Problem Relation Age of Onset    Diabetes Mother     Hypertension Mother     Tuberculosis Father     Diabetes Maternal Grandfather     Alcohol abuse Brother     Substance Abuse Brother     COPD Brother      Social History     Tobacco Use    Smoking status: Every Day     Current packs/day: 0.50     Average packs/day: 0.5 packs/day for 46.9 years (23.4 ttl pk-yrs)     Types: Cigarettes     Start date: 1/1/1978    Smokeless tobacco: Never   Vaping Use    Vaping status: Never Used   Substance and Sexual Activity    Alcohol use: Yes     Alcohol/week: 4.0 standard drinks of alcohol     Types: 4 Shots of liquor per week     Comment: occ  "   Drug use: Never    Sexual activity: Not Currently     Partners: Female     Current Outpatient Medications on File Prior to Visit   Medication Sig    [employer] Unifine Pentips Plus 31G X 8 MM MISC USE TO INJECT INSULIN THREE TIMES A DAY    cetirizine (ZyrTEC) 10 mg tablet Take 10 mg by mouth daily    citalopram (CeleXA) 20 mg tablet TAKE ONE TABLET BY MOUTH EVERY DAY    Continuous Blood Gluc Sensor (Dexcom G7 Sensor) USE ONE SENSOR EVERY 10 DAYS    gabapentin (NEURONTIN) 300 mg capsule TAKE ONE CAPSULE BY MOUTH TWICE A DAY    insulin glargine (Lantus SoloStar) 100 units/mL injection pen Inject 19 Units under the skin every 12 (twelve) hours    irbesartan (AVAPRO) 300 mg tablet Take 300 mg by mouth daily    metFORMIN (GLUCOPHAGE) 1000 MG tablet Take 1,000 mg by mouth 2 (two) times a day    Mounjaro 5 MG/0.5ML SOPN Inject 10 mg under the skin every 7 days    rosuvastatin (CRESTOR) 20 MG tablet TAKE ONE TABLET BY MOUTH EVERY MORNING     Allergies   Allergen Reactions    Dust Mite Extract Allergic Rhinitis    Molds & Smuts Allergic Rhinitis    Pollen Extract Allergic Rhinitis     Maple trees    Strawberry Extract - Food Allergy Rash     Immunization History   Administered Date(s) Administered    COVID-19 PFIZER VACCINE 0.3 ML IM 03/20/2021, 04/10/2021, 12/03/2021    INFLUENZA 12/22/2009, 12/20/2011, 12/18/2012, 11/18/2014, 12/06/2016, 12/09/2016, 11/07/2017, 12/18/2018, 01/06/2019, 10/25/2022    Influenza Recombinant Preservative Free Im 09/13/2024    Influenza, injectable, quadrivalent, preservative free 0.5 mL 09/15/2020, 10/06/2023    Influenza, recombinant, quadrivalent,injectable, preservative free 09/21/2021, 10/25/2022    Pneumococcal Polysaccharide PPV23 12/18/2018    Tdap 01/19/2021, 07/07/2022    influenza, injectable, quadrivalent 12/03/2019     Objective   /70 (BP Location: Left arm, Patient Position: Sitting, Cuff Size: Standard)   Pulse 80   Resp 16   Ht 5' 11\" (1.803 m)   Wt 91.6 kg (202 lb)   " SpO2 97%   BMI 28.17 kg/m²     Physical Exam  Vitals and nursing note reviewed.   Constitutional:       Appearance: Normal appearance. He is normal weight.   Neck:      Vascular: No carotid bruit.   Cardiovascular:      Rate and Rhythm: Normal rate and regular rhythm.      Heart sounds: Normal heart sounds. No murmur heard.  Pulmonary:      Effort: Pulmonary effort is normal.      Breath sounds: Normal breath sounds. No wheezing.   Musculoskeletal:      Cervical back: Normal range of motion and neck supple. No muscular tenderness.      Right lower leg: No edema.      Left lower leg: No edema.   Lymphadenopathy:      Cervical: No cervical adenopathy.   Neurological:      Mental Status: He is alert.   Psychiatric:         Mood and Affect: Mood normal.         Behavior: Behavior normal.         Thought Content: Thought content normal.         Judgment: Judgment normal.

## 2024-11-15 NOTE — ASSESSMENT & PLAN NOTE
Patient has had increased pain and radiates down left leg. Patient to see Pain Management next week. Has had rhizotomy in the past.     Orders:    naproxen (NAPROSYN) 500 mg tablet; Take 1 tablet (500 mg total) by mouth 2 (two) times a day with meals

## 2024-11-15 NOTE — ASSESSMENT & PLAN NOTE
Patient has had increased irritability and decreased concentration for past few weeks. Started when back pain got worse. Patient to see Pain Management next week and will give naproxen 500 mg twice a day. Patient also to increase citalopram to 30 mg daily. Call if no better.

## 2024-11-22 NOTE — PROGRESS NOTES
Assessment  1. Lumbar spondylosis    2. Chronic bilateral low back pain, unspecified whether sciatica present        Plan  59-year-old male with a history of lumbar spondylosis returning for interval consultation regarding axial lumbosacral back pain.  Pain is essentially the same as it was in 2021 when he was seen and treated by us at that time.  Patient had a bilateral L3-5 RFA completed June 29, 2021 which gave him 100% of relief that lasted 2 years.  Pain has gradually worsened over the past year.  He denies any interval trauma.  He has found some relief with naproxen.  He has not done any recent formal physical therapy.  The patient's facet mediated low back pain has returned and he would like to repeat RFA considering response to last procedure.    1.  Will repeat bilateral L3-5 RFA  2.  Order for physical therapy has been placed  3.  Order placed for updated x-ray of the lumbar spine  4.  Patient may continue with naproxen as needed as prescribed  5.  I will follow-up with the patient after RFA      Complete risks and benefits including bleeding, infection, tissue reaction, nerve injury and allergic reaction were discussed. The approach was demonstrated using models and literature was provided. Verbal and written consent was obtained.    My impressions and treatment recommendations were discussed in detail with the patient who verbalized understanding and had no further questions.  Discharge instructions were provided. I personally saw and examined the patient and I agree with the above discussed plan of care.    No orders of the defined types were placed in this encounter.    No orders of the defined types were placed in this encounter.      History of Present Illness    Checo Felder is a 59 y.o. male with a history of lumbar spondylosis returning for interval consultation regarding axial lumbosacral back pain.  Pain is essentially the same as it was in 2021 when he was seen and treated by us at that time.   He will very rarely have some pain radiating down the anterior aspect of the left leg which is intermittent and self-limited.  He has not had this pain for a while now.  He he denies any weakness of the legs, bladder or bowel incontinence, or saddle anesthesia.  Patient had a bilateral L3-5 RFA completed June 29, 2021 which gave him 100% of relief that lasted 2 years.  Pain has gradually worsened over the past year.  He denies any interval trauma.  He has found some relief with naproxen.  He has not done any recent formal physical therapy.  The patient rates his pain a 7 out of 10 and the pain is nearly constant.  The pain does not follow any particular pattern throughout the day.  The pain is described as shooting and throbbing.  The pain is increased with standing, bending, coughing, and sneezing.  He does find some relief with heat and ice, naproxen, injections, exercise, and PT.    Other than as stated above, the patient denies any interval changes in medications, medical condition, mental condition, symptoms, or allergies since the last office visit.    I have personally reviewed and/or updated the patient's past medical history, past surgical history, family history, social history, current medications, allergies, and vital signs today.     Review of Systems   Constitutional:  Negative for fever and unexpected weight change.   HENT:  Negative for trouble swallowing.    Eyes:  Negative for visual disturbance.   Respiratory:  Negative for shortness of breath and wheezing.    Cardiovascular:  Negative for chest pain and palpitations.   Gastrointestinal:  Negative for constipation, diarrhea, nausea and vomiting.   Endocrine: Negative for cold intolerance, heat intolerance and polydipsia.   Genitourinary:  Negative for difficulty urinating and frequency.   Musculoskeletal:  Negative for arthralgias, gait problem, joint swelling and myalgias.   Skin:  Negative for rash.   Neurological:  Negative for dizziness,  seizures, syncope, weakness and headaches.   Hematological:  Does not bruise/bleed easily.   Psychiatric/Behavioral:  Negative for dysphoric mood.    All other systems reviewed and are negative.      Patient Active Problem List   Diagnosis    Type 2 diabetes mellitus without complication, with long-term current use of insulin (HCC)    Essential hypertension    Mixed hyperlipidemia    Adjustment disorder with withdrawal    Leukocytosis    Tobacco use    EILEEN (obstructive sleep apnea)    PIERRE (generalized anxiety disorder)    GERD (gastroesophageal reflux disease)    Colon polyps    Hip pain    Radiculopathy, lumbar region    DDD (degenerative disc disease), lumbar    Lumbar facet arthropathy    Chronic pain syndrome    Lumbar spondylosis    Abnormal involuntary movements       Past Medical History:   Diagnosis Date    Allergic     Anxiety     Diabetes mellitus (HCC)     GERD (gastroesophageal reflux disease)     Hyperlipidemia     Hypertension     Pre-operative clearance 12/08/2023    Sleep apnea     does not use CPAP anymore       Past Surgical History:   Procedure Laterality Date    ASD REPAIR      COLONOSCOPY  01/25/2019    Due 1/2024    HERNIA REPAIR      rpr umbil johny reduc >5 yr    [place] CORRECTION HAMMERTOE Left 1/5/2024    Procedure: 4TH AND 5TH HAMMERTOE CORRECTION;  Surgeon: Andrae Lamas DPM;  Location:  MAIN OR;  Service: Podiatry    PSA,SCREENING (HISTORICAL)  01/28/2019    WISDOM TOOTH EXTRACTION         Family History   Problem Relation Age of Onset    Diabetes Mother     Hypertension Mother     Tuberculosis Father     Diabetes Maternal Grandfather     Alcohol abuse Brother     Substance Abuse Brother     COPD Brother        Social History     Occupational History    Not on file   Tobacco Use    Smoking status: Every Day     Current packs/day: 0.50     Average packs/day: 0.5 packs/day for 46.9 years (23.4 ttl pk-yrs)     Types: Cigarettes     Start date: 1/1/1978    Smokeless tobacco: Never  "  Vaping Use    Vaping status: Never Used   Substance and Sexual Activity    Alcohol use: Yes     Alcohol/week: 4.0 standard drinks of alcohol     Types: 4 Shots of liquor per week     Comment: occ    Drug use: Never    Sexual activity: Not Currently     Partners: Female       Current Outpatient Medications on File Prior to Visit   Medication Sig    [employer] Unifine Pentips Plus 31G X 8 MM MISC USE TO INJECT INSULIN THREE TIMES A DAY    cetirizine (ZyrTEC) 10 mg tablet Take 10 mg by mouth daily    citalopram (CeleXA) 20 mg tablet TAKE ONE TABLET BY MOUTH EVERY DAY    Continuous Blood Gluc Sensor (Dexcom G7 Sensor) USE ONE SENSOR EVERY 10 DAYS    gabapentin (NEURONTIN) 300 mg capsule TAKE ONE CAPSULE BY MOUTH TWICE A DAY    insulin glargine (Lantus SoloStar) 100 units/mL injection pen Inject 19 Units under the skin every 12 (twelve) hours    irbesartan (AVAPRO) 300 mg tablet Take 300 mg by mouth daily    metFORMIN (GLUCOPHAGE) 1000 MG tablet Take 1,000 mg by mouth 2 (two) times a day    Mounjaro 5 MG/0.5ML SOPN Inject 10 mg under the skin every 7 days    naproxen (NAPROSYN) 500 mg tablet Take 1 tablet (500 mg total) by mouth 2 (two) times a day with meals    rosuvastatin (CRESTOR) 20 MG tablet TAKE ONE TABLET BY MOUTH EVERY MORNING     No current facility-administered medications on file prior to visit.       Allergies   Allergen Reactions    Dust Mite Extract Allergic Rhinitis    Molds & Smuts Allergic Rhinitis    Pollen Extract Allergic Rhinitis     Maple trees    Strawberry Extract - Food Allergy Rash       Physical Exam    /75   Pulse 90   Ht 5' 11\" (1.803 m)   Wt 90.7 kg (200 lb)   BMI 27.89 kg/m²     Constitutional: normal, well developed, well nourished, alert, in no distress and non-toxic and no overt pain behavior.  Eyes: anicteric  HEENT: grossly intact  Neck: supple, symmetric, trachea midline and no masses   Pulmonary:even and unlabored  Cardiovascular:No edema or pitting edema " present  Skin:Normal without rashes or lesions and well hydrated  Psychiatric:Mood and affect appropriate  Neurologic:Cranial Nerves II-XII grossly intact  Musculoskeletal:normal gait.  Bilateral lumbar paraspinals tender to palpation from L4-L5.  Bilateral SI joints nontender to palpation.  Bilateral trochanteric flares nontender to palpation.  Bilateral patellar and Achilles reflexes were 2/4 and symmetrical.  No clonus was noted bilaterally.  Bilateral lower extremity strength was 5/5 in all muscle groups.  Sensation intact to light touch in L3 thru S1 dermatomes bilaterally.  Negative straight leg raise bilaterally.  Negative Valeriy's and Gaenslen's test bilaterally.  Positive lumbar facet loading maneuvers bilaterally.    Imaging  LUMBAR SPINE     INDICATION:   M54.16: Radiculopathy, lumbar region.     COMPARISON:  None     VIEWS:  XR SPINE LUMBAR MINIMUM 4 VIEWS NON INJURY        FINDINGS:     There are 5 non rib bearing lumbar vertebral bodies.      There is no evidence of acute fracture or destructive osseous lesion.     Alignment is unremarkable.  No abnormal motion on flexion/extension views.     Mild multilevel degenerative disc disease and moderate osteoarthritis of the lower lumbar facet joints.     The pedicles appear intact.     Soft tissues are unremarkable.     IMPRESSION:     No acute osseous abnormality.       Degenerative changes as described.

## 2024-11-25 ENCOUNTER — HOSPITAL ENCOUNTER (OUTPATIENT)
Dept: RADIOLOGY | Facility: HOSPITAL | Age: 59
Discharge: HOME | End: 2024-11-25
Attending: ANESTHESIOLOGY
Payer: COMMERCIAL

## 2024-11-25 ENCOUNTER — TELEPHONE (OUTPATIENT)
Age: 59
End: 2024-11-25

## 2024-11-25 ENCOUNTER — CONSULT (OUTPATIENT)
Dept: PAIN MEDICINE | Facility: CLINIC | Age: 59
End: 2024-11-25
Payer: COMMERCIAL

## 2024-11-25 VITALS
HEART RATE: 90 BPM | SYSTOLIC BLOOD PRESSURE: 115 MMHG | WEIGHT: 200 LBS | DIASTOLIC BLOOD PRESSURE: 75 MMHG | HEIGHT: 71 IN | BODY MASS INDEX: 28 KG/M2

## 2024-11-25 DIAGNOSIS — M47.816 LUMBAR SPONDYLOSIS: Primary | ICD-10-CM

## 2024-11-25 DIAGNOSIS — M54.50 CHRONIC BILATERAL LOW BACK PAIN, UNSPECIFIED WHETHER SCIATICA PRESENT: ICD-10-CM

## 2024-11-25 DIAGNOSIS — M47.816 LUMBAR SPONDYLOSIS: ICD-10-CM

## 2024-11-25 DIAGNOSIS — G89.29 CHRONIC BILATERAL LOW BACK PAIN, UNSPECIFIED WHETHER SCIATICA PRESENT: ICD-10-CM

## 2024-11-25 PROCEDURE — 72110 X-RAY EXAM L-2 SPINE 4/>VWS: CPT

## 2024-11-25 PROCEDURE — 99244 OFF/OP CNSLTJ NEW/EST MOD 40: CPT | Performed by: ANESTHESIOLOGY

## 2024-11-25 NOTE — TELEPHONE ENCOUNTER
Caller: yoni Kessler    Doctor: Elle    Reason for call: pt called for cpt codes for his procedure.  He was given the codes that are in the appt notes.  I reassured pt that there is behind the scene things that happen for us to be sure the procedure will be covered.  Pt stated he was going to reach out to his health insurance as well    Call back#: [phone]

## 2024-11-25 NOTE — TELEPHONE ENCOUNTER
Called patient provided codes and made him aware his info has been sent to PEC team we will call with and Cx.

## 2024-12-03 ENCOUNTER — EVALUATION (OUTPATIENT)
Dept: PHYSICAL THERAPY | Age: 59
End: 2024-12-03
Payer: COMMERCIAL

## 2024-12-03 DIAGNOSIS — G89.29 CHRONIC BILATERAL LOW BACK PAIN, UNSPECIFIED WHETHER SCIATICA PRESENT: ICD-10-CM

## 2024-12-03 DIAGNOSIS — M47.816 LUMBAR SPONDYLOSIS: ICD-10-CM

## 2024-12-03 DIAGNOSIS — M54.50 CHRONIC BILATERAL LOW BACK PAIN, UNSPECIFIED WHETHER SCIATICA PRESENT: ICD-10-CM

## 2024-12-03 PROCEDURE — 97112 NEUROMUSCULAR REEDUCATION: CPT

## 2024-12-03 PROCEDURE — 97110 THERAPEUTIC EXERCISES: CPT

## 2024-12-03 PROCEDURE — 97161 PT EVAL LOW COMPLEX 20 MIN: CPT

## 2024-12-03 NOTE — PROGRESS NOTES
PT Evaluation     Today's date: 12/3/2024  Patient name: Checo Felder  : 1965  MRN: [id number]  Referring provider: Kevin Almeida DO  Dx:   Encounter Diagnosis     ICD-10-CM    1. Lumbar spondylosis  M47.816 Ambulatory referral to Physical Therapy      2. Chronic bilateral low back pain, unspecified whether sciatica present  M54.50 Ambulatory referral to Physical Therapy    G89.29           Start Time: 807  Stop Time: 856  Total time in clinic (min): 49 minutes    Assessment  Impairments: abnormal or restricted ROM, impaired physical strength, lacks appropriate home exercise program, pain with function, poor posture , poor body mechanics, participation limitations and activity limitations  Symptom irritability: moderate    Assessment details: Pt is a 58 y/o male who presents with low back pain. No further referral is necessary at this time. Pt has a movement impairment diagnosis of decreased lumbar mobility and core/low back strength representing a pathoantomical diagnosis of lumbar spondylosis. Pt is experiencing pain, decreased strength, and decreased ROM. These impairments are impacting their ability to perform functional activities including driving, squatting/bending, and lifting/carrying. Pt has a positive prognosis. Pt would benefit from skilled PT intervention to address the aforementioned impairments leading to increased functional capacity and improved quality of life.      Understanding of Dx/Px/POC: good     Prognosis: good    Goals  Short Term Goals: to be achieved by 4 weeks  1) Patient to be independent with basic HEP.  2) Decrease pain to 3/10 at its worst.  3) Increase lumbar spine ROM by 25% in all deficient planes.   4) Pt will demonstrate improved LE strength by 1/2 MMT grade in all deficient planes.    Long Term Goals: to be achieved by discharge  1) FOTO equal to or greater than 59.  2) Patient to be independent with comprehensive HEP.  3) Pt will demonstrate lumbar spine ROM WNL in  all planes to improve performance of a/iadls.  4) Pt will improve LE strength to 5/5 MMT grade in all planes to improve performance of a/iadls.  5) Pt will be able to squat/bend forward 5/5 times with good technique and no reports of pain to aid in ability to  objects from low surfaces.  6) Pt will be able to sit at least 30min without symptoms to aid in improved ability to drive comfortably.      Plan  Patient would benefit from: skilled physical therapy  Planned modality interventions: cryotherapy and thermotherapy: hydrocollator packs    Planned therapy interventions: activity modification, balance, functional ROM exercises, graded activity, graded exercise, home exercise program, patient/caregiver education, strengthening, stretching, therapeutic activities, therapeutic exercise, therapeutic training and neuromuscular re-education    Frequency: 1-2x week  Duration in weeks: 8  Treatment plan discussed with: patient        Subjective Evaluation    History of Present Illness  Mechanism of injury: Checo is a 60 y/o male presenting to OPPT for IE with chief complaint of low back pain with reports of symptoms occasionally radiating into his left leg. He reports no noticeable pattern with the radiating symptoms, and that it has not occurred for a few weeks. He states this pain has been present for several years, and that he has received PT treatment for it in the past, but it has returned and has been worsening recently. HE also reports having radiofrequency ablation a few years ago, and that he is scheduled for another RFA in the net couple of weeks. Denies numbness/tingling and all red flag signs/symptoms. States that he currently difficulty with lifting/carrying, squatting/bending, and driving as a result of the pain.    Quality of life: good    Patient Goals  Patient goals for therapy: increased strength, independence with ADLs/IADLs, decreased pain, improved balance, return to sport/leisure activities and  "increased motion    Pain  Current pain ratin  At best pain ratin  At worst pain ratin  Quality: dull ache, sharp and radiating  Relieving factors: relaxation, rest and medications  Aggravating factors: sitting, stair climbing, standing, walking and running  Progression: worsening      Diagnostic Tests  Abnormal x-ray: Per EMR: \"Moderate facet disease in the lower lumbar spine. Mild multilevel spondylosis throughout the lumbar spine.\".        Objective     Concurrent Complaints  Negative for night pain, disturbed sleep, bladder dysfunction, bowel dysfunction and saddle (S4) numbness    Tenderness     Lumbar Spine  No tenderness in the spinous process and facet joint.     Left Hip   Tenderness in the PSIS.     Right Hip   Tenderness in the PSIS.     Active Range of Motion     Lumbar   Flexion:  with pain Restriction level: moderate  Extension:  with pain Restriction level: moderate  Left lateral flexion:  with pain Restriction level: minimal  Right lateral flexion:  Restriction level: minimal  Left rotation:  Restriction level: minimal  Right rotation:  Restriction level: minimal    Strength/Myotome Testing     Left Hip   Planes of Motion   Flexion: 5  Extension: 4+  Abduction: 4  Adduction: WFL    Right Hip   Planes of Motion   Flexion: 5  Extension: 4+  Abduction: 4+  Adduction: WFL    Left Knee   Flexion: 4+  Extension: 4+    Right Knee   Flexion: 4+  Extension: 5    Left Ankle/Foot   Dorsiflexion: 5  Plantar flexion: 4+    Right Ankle/Foot   Dorsiflexion: 5  Plantar flexion: 4+    Tests     Lumbar     Left   Negative crossed SLR, passive SLR and slump test.     Right   Negative crossed SLR, passive SLR and slump test.     Left Pelvic Girdle/Sacrum   Positive: active SLR test.     Right Pelvic Girdle/Sacrum   Negative: active SLR test.     Left Hip   Positive KENDY.   Negative FADIR.     Right Hip   Negative KENDY and FADIR.     Additional Tests Details  (+) Terrance's sign  Pain with resisted lumbar " extension and rotation bilaterally  Pain with hooklying abdominal crunch    General Comments:    Lower quarter screen   Hips: unremarkable  Knees: unremarkable  Foot/ankle: unremarkable      Flowsheet Rows      Flowsheet Row Most Recent Value   PT/OT G-Codes    Current Score 46   Projected Score 59               Precautions: HTN, T2DM      Manuals                                                                 Neuro Re-Ed 12/2            DKTC 3x10                                                                                          Ther Ex 12/2            Open books w/ lumbar rotation 2x10 B/L            Clamshells GTB 3x10                                                                                          Ther Activity                                       Gait Training                                       Modalities

## 2024-12-04 ENCOUNTER — HOSPITAL ENCOUNTER (OUTPATIENT)
Dept: RADIOLOGY | Facility: CLINIC | Age: 59
Discharge: HOME | End: 2024-12-04

## 2024-12-06 ENCOUNTER — APPOINTMENT (OUTPATIENT)
Dept: PHYSICAL THERAPY | Age: 59
End: 2024-12-06
Payer: COMMERCIAL

## 2024-12-11 DIAGNOSIS — E11.9 TYPE 2 DIABETES MELLITUS WITHOUT COMPLICATION, WITH LONG-TERM CURRENT USE OF INSULIN (HCC): Primary | ICD-10-CM

## 2024-12-11 DIAGNOSIS — E78.5 DYSLIPIDEMIA: ICD-10-CM

## 2024-12-11 DIAGNOSIS — Z79.4 TYPE 2 DIABETES MELLITUS WITHOUT COMPLICATION, WITH LONG-TERM CURRENT USE OF INSULIN (HCC): Primary | ICD-10-CM

## 2024-12-12 RX ORDER — ROSUVASTATIN CALCIUM 20 MG/1
20 TABLET, COATED ORAL DAILY
Qty: 30 TABLET | Refills: 0 | Status: SHIPPED | OUTPATIENT
Start: 2024-12-12

## 2024-12-13 ENCOUNTER — OFFICE VISIT (OUTPATIENT)
Dept: PHYSICAL THERAPY | Age: 59
End: 2024-12-13
Payer: COMMERCIAL

## 2024-12-13 DIAGNOSIS — M54.50 CHRONIC BILATERAL LOW BACK PAIN, UNSPECIFIED WHETHER SCIATICA PRESENT: ICD-10-CM

## 2024-12-13 DIAGNOSIS — G89.29 CHRONIC BILATERAL LOW BACK PAIN, UNSPECIFIED WHETHER SCIATICA PRESENT: ICD-10-CM

## 2024-12-13 DIAGNOSIS — M47.816 LUMBAR SPONDYLOSIS: Primary | ICD-10-CM

## 2024-12-13 PROCEDURE — 97110 THERAPEUTIC EXERCISES: CPT

## 2024-12-13 PROCEDURE — 97112 NEUROMUSCULAR REEDUCATION: CPT

## 2024-12-13 NOTE — PROGRESS NOTES
Daily Note     Today's date: 2024  Patient name: Checo Felder  : 1965  MRN: [id number]  Referring provider: Kevin Almeida DO  Dx:   Encounter Diagnosis     ICD-10-CM    1. Lumbar spondylosis  M47.816       2. Chronic bilateral low back pain, unspecified whether sciatica present  M54.50     G89.29           Start Time: 1259  Stop Time: 1352  Total time in clinic (min): 53 minutes    Subjective: Pt reports that his back feels sore but has not had much time to relax recently due to a busy week at work. Reports no radicular symptoms in the past couple of weeks.      Objective: See treatment diary below      Assessment: Interventions focused on lumbar mobility and strengthening. Pt reported slight L glute discomfortmwith clamshells and increased tightness in low back on R side, but otherwise no increase in pain/symptoms throughout session. He reported slight relief of symptoms following ball rollouts to the left. Tolerated treatment well. Patient would benefit from continued PT      Plan: Continue per plan of care.  Progress treatment as tolerated.       Precautions: HTN, T2DM      Manuals                                                                 Neuro Re-Ed 12/3 12/13           DKTC 3x10 3x10           Dead Bugs             Paloff Press             Bird Dogs             Skiiers  GTB 3x10                                     Ther Ex 12/3 12/13           Open books w/ lumbar rotation 2x10 B/L 2x10 B/L           Clamshells GTB 3x10 GTB 2x10 B/L           Bike  UB 10'           Ball Rollouts  Fwd and left 2x10ea           Pt Ed  ES                                                  Ther Activity                                       Gait Training                                       Modalities

## 2024-12-18 ENCOUNTER — HOSPITAL ENCOUNTER (OUTPATIENT)
Dept: RADIOLOGY | Facility: CLINIC | Age: 59
Discharge: HOME | End: 2024-12-18
Payer: COMMERCIAL

## 2024-12-18 ENCOUNTER — TELEPHONE (OUTPATIENT)
Dept: PAIN MEDICINE | Facility: CLINIC | Age: 59
End: 2024-12-18

## 2024-12-18 VITALS
TEMPERATURE: 97 F | DIASTOLIC BLOOD PRESSURE: 80 MMHG | RESPIRATION RATE: 18 BRPM | SYSTOLIC BLOOD PRESSURE: 136 MMHG | HEART RATE: 78 BPM | OXYGEN SATURATION: 99 %

## 2024-12-18 DIAGNOSIS — M47.816 LUMBAR SPONDYLOSIS: ICD-10-CM

## 2024-12-18 PROCEDURE — 64636 DESTROY L/S FACET JNT ADDL: CPT | Performed by: ANESTHESIOLOGY

## 2024-12-18 PROCEDURE — 64635 DESTROY LUMB/SAC FACET JNT: CPT | Performed by: ANESTHESIOLOGY

## 2024-12-18 RX ORDER — LIDOCAINE HYDROCHLORIDE 10 MG/ML
8 INJECTION, SOLUTION EPIDURAL; INFILTRATION; INTRACAUDAL; PERINEURAL ONCE
Status: COMPLETED | OUTPATIENT
Start: 2024-12-18 | End: 2024-12-18

## 2024-12-18 RX ORDER — BUPIVACAINE HYDROCHLORIDE 5 MG/ML
3 INJECTION, SOLUTION EPIDURAL; INTRACAUDAL ONCE
Status: COMPLETED | OUTPATIENT
Start: 2024-12-18 | End: 2024-12-18

## 2024-12-18 RX ADMIN — LIDOCAINE HYDROCHLORIDE 8 ML: 10 INJECTION, SOLUTION EPIDURAL; INFILTRATION; INTRACAUDAL; PERINEURAL at 13:54

## 2024-12-18 RX ADMIN — LIDOCAINE HYDROCHLORIDE 3 ML: 20 INJECTION, SOLUTION EPIDURAL; INFILTRATION; INTRACAUDAL; PERINEURAL at 13:56

## 2024-12-18 RX ADMIN — BUPIVACAINE HYDROCHLORIDE 3 ML: 5 INJECTION, SOLUTION EPIDURAL; INTRACAUDAL; PERINEURAL at 13:56

## 2024-12-18 NOTE — H&P
History of Present Illness: The patient is a 59 y.o. male who presents with complaints of low back pain.    Past Medical History:   Diagnosis Date    Allergic     Anxiety     Diabetes mellitus (HCC)     GERD (gastroesophageal reflux disease)     Hyperlipidemia     Hypertension     Pre-operative clearance 12/08/2023    Sleep apnea     does not use CPAP anymore       Past Surgical History:   Procedure Laterality Date    ASD REPAIR      COLONOSCOPY  01/25/2019    Due 1/2024    HERNIA REPAIR      rpr umbil johny reduc >5 yr    AZ CORRECTION HAMMERTOE Left 1/5/2024    Procedure: 4TH AND 5TH HAMMERTOE CORRECTION;  Surgeon: Andrae Lamas DPM;  Location:  MAIN OR;  Service: Podiatry    PSA,SCREENING (HISTORICAL)  01/28/2019    WISDOM TOOTH EXTRACTION           Current Outpatient Medications:     [employer]One Unifine Pentips Plus 31G X 8 MM MISC, USE TO INJECT INSULIN THREE TIMES A DAY, Disp: , Rfl:     cetirizine (ZyrTEC) 10 mg tablet, Take 10 mg by mouth daily, Disp: , Rfl:     citalopram (CeleXA) 20 mg tablet, TAKE ONE TABLET BY MOUTH EVERY DAY, Disp: 90 tablet, Rfl: 1    Continuous Blood Gluc Sensor (Dexcom G7 Sensor), USE ONE SENSOR EVERY 10 DAYS, Disp: , Rfl:     gabapentin (NEURONTIN) 300 mg capsule, TAKE ONE CAPSULE BY MOUTH TWICE A DAY (Patient not taking: Reported on 11/25/2024), Disp: 60 capsule, Rfl: 5    insulin glargine (Lantus SoloStar) 100 units/mL injection pen, Inject 19 Units under the skin every 12 (twelve) hours, Disp: , Rfl:     irbesartan (AVAPRO) 300 mg tablet, Take 300 mg by mouth daily, Disp: , Rfl:     metFORMIN (GLUCOPHAGE) 1000 MG tablet, Take 1 tablet (1,000 mg total) by mouth 2 (two) times a day, Disp: 60 tablet, Rfl: 0    Mounjaro 5 MG/0.5ML SOPN, Inject 10 mg under the skin every 7 days, Disp: , Rfl:     naproxen (NAPROSYN) 500 mg tablet, Take 1 tablet (500 mg total) by mouth 2 (two) times a day with meals, Disp: 30 tablet, Rfl: 0    rosuvastatin (CRESTOR) 20 MG tablet, Take 1 tablet  (20 mg total) by mouth daily, Disp: 30 tablet, Rfl: 0    Allergies   Allergen Reactions    Dust Mite Extract Allergic Rhinitis    Molds & Smuts Allergic Rhinitis    Pollen Extract Allergic Rhinitis     Maple trees    Strawberry Extract - Food Allergy Rash       Physical Exam:   Vitals:    12/18/24 1336   BP: 113/74   Pulse: 72   Resp: 20   Temp: (!) 97 °F (36.1 °C)   SpO2: 97%     General: Awake, Alert, Oriented x 3, Mood and affect appropriate  Respiratory: Respirations even and unlabored  Cardiovascular: Peripheral pulses intact; no edema  Musculoskeletal Exam: normal gait    ASA Score: 3         Assessment: Lumbar spondylosis    Plan: Left L3-5 RFA

## 2024-12-18 NOTE — DISCHARGE INSTRUCTIONS

## 2024-12-20 ENCOUNTER — OFFICE VISIT (OUTPATIENT)
Dept: PHYSICAL THERAPY | Age: 59
End: 2024-12-20
Payer: COMMERCIAL

## 2024-12-20 DIAGNOSIS — M47.816 LUMBAR SPONDYLOSIS: Primary | ICD-10-CM

## 2024-12-20 DIAGNOSIS — G89.29 CHRONIC BILATERAL LOW BACK PAIN, UNSPECIFIED WHETHER SCIATICA PRESENT: ICD-10-CM

## 2024-12-20 DIAGNOSIS — M54.50 CHRONIC BILATERAL LOW BACK PAIN, UNSPECIFIED WHETHER SCIATICA PRESENT: ICD-10-CM

## 2024-12-20 PROCEDURE — 97112 NEUROMUSCULAR REEDUCATION: CPT

## 2024-12-20 PROCEDURE — 97110 THERAPEUTIC EXERCISES: CPT

## 2024-12-20 NOTE — PROGRESS NOTES
"Daily Note     Today's date: 2024  Patient name: Checo Felder  : 1965  MRN: [id number]  Referring provider: Kevin Almeida DO  Dx:   Encounter Diagnosis     ICD-10-CM    1. Lumbar spondylosis  M47.816       2. Chronic bilateral low back pain, unspecified whether sciatica present  M54.50     G89.29           Start Time: 1019  Stop Time: 1108  Total time in clinic (min): 49 minutes    Subjective: Pt reports he feels good overall today. States he received radiofrequency ablation (L3-L5, per chart) on Wednesday.. He reports he has felt better since, despite initial soreness the day after. States he receives an RFA for the other side (R side) on 24.      Objective: See treatment diary below      Assessment: Pt demonstrated improved lumbar flexion ROM this session and decreased pain/tightness with interventions. Reported slight \"pull\" in low back with squats, which was alleviated when cued to reduce amount of descent during the movement. Tolerated treatment well. Patient demonstrated fatigue post treatment and would benefit from continued PT      Plan: Continue per plan of care.  Progress treatment as tolerated.       Precautions: HTN, T2DM      Manuals                                                                 Neuro Re-Ed 12/3 12/13 12/20          DKTC 3x10 3x10 3x10          Dead Bugs             Paloff Press   CC 10# 2x10ea          Bird Dogs             Skiiers  GTB 3x10 GTB 3x10                                    Ther Ex 12/3 12/13 12/20          Open books w/ lumbar rotation 2x10 B/L 2x10 B/L 2x10 B/L          Clamshells GTB 3x10 GTB 2x10 B/L GTB 2x10 B/L          Bike  UB 10' UB 10' Lvl 6          Ball Rollouts  Fwd and left 2x10ea           Pt Ed  ES ES                                                 Ther Activity                                       Gait Training                                       Modalities                                              "

## 2024-12-23 NOTE — TELEPHONE ENCOUNTER
S/w the patient post opro and he stated his pain has decreased. He took the band aids off and denies a fever. Reviewed the postoperative instructions and appreciated the call.

## 2024-12-27 ENCOUNTER — OFFICE VISIT (OUTPATIENT)
Dept: PHYSICAL THERAPY | Age: 59
End: 2024-12-27
Payer: COMMERCIAL

## 2024-12-27 DIAGNOSIS — M47.816 LUMBAR SPONDYLOSIS: Primary | ICD-10-CM

## 2024-12-27 DIAGNOSIS — G89.29 CHRONIC BILATERAL LOW BACK PAIN, UNSPECIFIED WHETHER SCIATICA PRESENT: ICD-10-CM

## 2024-12-27 DIAGNOSIS — M54.50 CHRONIC BILATERAL LOW BACK PAIN, UNSPECIFIED WHETHER SCIATICA PRESENT: ICD-10-CM

## 2024-12-27 PROCEDURE — 97112 NEUROMUSCULAR REEDUCATION: CPT

## 2024-12-27 PROCEDURE — 97110 THERAPEUTIC EXERCISES: CPT

## 2024-12-27 NOTE — PROGRESS NOTES
Daily Note     Today's date: 2024  Patient name: Checo Felder  : 1965  MRN: [id number]  Referring provider: Kevin Almeida DO  Dx:   Encounter Diagnosis     ICD-10-CM    1. Lumbar spondylosis  M47.816       2. Chronic bilateral low back pain, unspecified whether sciatica present  M54.50     G89.29             Subjective: Patient denies any pain upon arrival. Primary complaint is back pain with driving.      Objective: See treatment diary below      Assessment: Tolerated treatment well. Noted challenge with addition of dead bugs today, requiring cues for proper form and technique. Educated in TrA engagement throughout. Notes soreness post tx. Patient demonstrated fatigue post treatment and would benefit from continued PT      Plan: Continue per plan of care.  Progress treatment as tolerated.       Precautions: HTN, T2DM      Manuals                                                                 Neuro Re-Ed 12/3 12/13 12/20 12/27         DKTC 3x10 3x10 3x10 3X10         Dead Bugs    2X10         Paloff Press   CC 10# 2x10ea CC 10# 2x10ea         Bird Dogs             Skiiers  GTB 3x10 GTB 3x10 GTB 3x10                                   Ther Ex 12/3 12/13 12/20          Open books w/ lumbar rotation 2x10 B/L 2x10 B/L 2x10 B/L 2x10 B/L         Clamshells GTB 3x10 GTB 2x10 B/L GTB 2x10 B/L GTB 2x10 B/L         Bike  UB 10' UB 10' Lvl 6 UB 10' Lvl 6         Ball Rollouts  Fwd and left 2x10ea           Pt Ed  ES ES                                                 Ther Activity                                       Gait Training                                       Modalities

## 2025-01-03 ENCOUNTER — OFFICE VISIT (OUTPATIENT)
Dept: PHYSICAL THERAPY | Age: 60
End: 2025-01-03
Payer: COMMERCIAL

## 2025-01-03 DIAGNOSIS — G89.29 CHRONIC BILATERAL LOW BACK PAIN, UNSPECIFIED WHETHER SCIATICA PRESENT: ICD-10-CM

## 2025-01-03 DIAGNOSIS — M54.50 CHRONIC BILATERAL LOW BACK PAIN, UNSPECIFIED WHETHER SCIATICA PRESENT: ICD-10-CM

## 2025-01-03 DIAGNOSIS — M47.816 LUMBAR SPONDYLOSIS: Primary | ICD-10-CM

## 2025-01-03 PROCEDURE — 97110 THERAPEUTIC EXERCISES: CPT

## 2025-01-03 PROCEDURE — 97112 NEUROMUSCULAR REEDUCATION: CPT

## 2025-01-03 NOTE — PROGRESS NOTES
"Daily Note     Today's date: 1/3/2025  Patient name: Checo Felder  : 1965  MRN: [id number]  Referring provider: Kevin Almeida DO  Dx:   Encounter Diagnosis     ICD-10-CM    1. Lumbar spondylosis  M47.816       2. Chronic bilateral low back pain, unspecified whether sciatica present  M54.50     G89.29           Start Time: 1013  Stop Time: 1059  Total time in clinic (min): 46 minutes    Subjective: PT reports no new complaints or symptoms since previous session. 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      Objective: See treatment diary below      Assessment: Continued interventions focusing on lumbar mobility and core strengthening. Tolerated dead bugs well, however required several rest breaks due to fatigue. Reported no lasting pain/discomfort at end of session. Added standing hip hinge for functional loading, with pt performing with good technique. Tolerated treatment well. Patient demonstrated fatigue post treatment and would benefit from continued PT      Plan: Continue per plan of care.  Progress treatment as tolerated.       Precautions: HTN, T2DM      Manuals                                                                 Neuro Re-Ed 12/3 12/13 12/20 12/27 1/3        DKTC 3x10 3x10 3x10 3X10 3x10        Dead Bugs    2X10 2x10        Paloff Press   CC 10# 2x10ea CC 10# 2x10ea CC 10# 2x10ea        Bird Dogs             Skiiers  GTB 3x10 GTB 3x10 GTB 3x10 GTB 3x10        Hip Hinge     5# reaching to 8\" step                     Ther Ex 12/3 12/13 12/20  1/3        Open books w/ lumbar rotation 2x10 B/L 2x10 B/L 2x10 B/L 2x10 B/L 2x10 B/L        Clamshells GTB 3x10 GTB 2x10 B/L GTB 2x10 B/L GTB 2x10 B/L         Bike  UB 10' UB 10' Lvl 6 UB 10' Lvl 6 UB 10' Lvl 6        Ball Rollouts  Fwd and left 2x10ea           Pt Ed  ES ES  ES                                               Ther Activity          "                              Gait Training                                       Modalities

## 2025-01-06 ENCOUNTER — TELEMEDICINE (OUTPATIENT)
Dept: OTHER | Facility: HOSPITAL | Age: 60
End: 2025-01-06
Payer: COMMERCIAL

## 2025-01-06 DIAGNOSIS — J06.9 UPPER RESPIRATORY TRACT INFECTION, UNSPECIFIED TYPE: Primary | ICD-10-CM

## 2025-01-06 PROCEDURE — 99213 OFFICE O/P EST LOW 20 MIN: CPT | Performed by: NURSE PRACTITIONER

## 2025-01-06 RX ORDER — BENZONATATE 200 MG/1
200 CAPSULE ORAL 3 TIMES DAILY PRN
Qty: 15 CAPSULE | Refills: 0 | Status: SHIPPED | OUTPATIENT
Start: 2025-01-06 | End: 2025-01-11

## 2025-01-07 NOTE — PROGRESS NOTES
Virtual Regular Visit  Name: Checo Felder      : 1965      MRN: [id number]  Encounter Provider: EULA Morris  Encounter Date: 2025   Encounter department: VIRTUAL CARE       Verification of patient location:  Patient is located at Home in the following state in which I hold an active license [place] :  Assessment & Plan  Upper respiratory tract infection, unspecified type    Orders:    benzonatate (TESSALON) 200 MG capsule; Take 1 capsule (200 mg total) by mouth 3 (three) times a day as needed for cough for up to 5 days        Encounter provider EULA Morris    The patient was identified by name and date of birth. Checo Felder was informed that this is a telemedicine visit and that the visit is being conducted through the Epic Embedded platform. He agrees to proceed..  My office door was closed. No one else was in the room.  He acknowledged consent and understanding of privacy and security of the video platform. The patient has agreed to participate and understands they can discontinue the visit at any time.    Patient is aware this is a billable service.     History was obtained from: History obtained from: patient  History of Present Illness     This is a 59 year old male here today for video visit.  He started 3 days ago with chest congestion and cough.  He states he has runny nose and watery eyes.  He denies any fevers at this time.  He is having some nasal congestion and sinus pressure.  He has been using alkaseltzer cold and flu/ robitussin.  He states he has wendi chest discomfort when coughing.  He did not test for covid.   He is vaccinated for covid flu.  He denies any sore throat or ear pain.  He has had a slight wheeze         Review of Systems   Constitutional:  Negative for activity change, chills and fever.   HENT:  Positive for congestion and rhinorrhea. Negative for sinus pressure and sinus pain.    Respiratory:  Positive for cough. Negative for shortness of breath and  wheezing.    Cardiovascular: Negative.    Gastrointestinal: Negative.    Musculoskeletal: Negative.    Skin: Negative.    Neurological: Negative.    Psychiatric/Behavioral: Negative.         Objective   There were no vitals taken for this visit.    Physical Exam    Visit Time  Total Visit Duration: 6 minutes not including the time spent for establishing the audio/video connection.

## 2025-01-07 NOTE — PATIENT INSTRUCTIONS
As we discussed your illness is viral.  No antibiotics are indicated at this time.  Rest and drink extra fluids.  Cough medication as prescribed. Tylenol or Motrin as needed for pain or fever.  Salt water gargles and throat lozenges for sore throat.  Follow up with PCP if no improvement.  Go to ER with worsening symptoms.      Cold Symptoms   WHAT YOU NEED TO KNOW:   A cold is an infection caused by a virus. The infection causes your upper respiratory system to become inflamed. Common symptoms of a cold include sneezing, dry throat, a stuffy nose, headache, watery eyes, and a cough. Your cough may be dry, or you may cough up mucus. You may also have muscle aches, joint pain, and tiredness. Rarely, you may have a fever. Most colds go away without treatment.  DISCHARGE INSTRUCTIONS:   Return to the emergency department if:   You have increased tiredness and weakness.    You are unable to eat.     Your heart is beating much faster than usual for you.     You see white spots in the back of your throat and your neck is swollen and sore to the touch.     You see pinpoint or larger reddish-purple dots on your skin.  Contact your healthcare provider if:   You have a fever higher than 102°F (38.9°C).    You have new or worsening shortness of breath.     You have thick nasal drainage for more than 2 days.     Your symptoms do not improve or get worse within 5 days.     You have questions or concerns about your condition or care.  Medicines:  The following medicines may be suggested by your healthcare provider to decrease your cold symptoms. These medicines are available without a doctor's order. Ask which medicines to take and when to take them. Follow directions.  NSAIDs or acetaminophen  help to bring down a fever or decrease pain.    Decongestants  help decrease nasal stuffiness.     Antihistamines  help decrease sneezing and a runny nose.     Cough suppressants  help decrease how much you cough.    Expectorants  help  loosen mucus so you can cough it up.    Take your medicine as directed.  Contact your healthcare provider if you think your medicine is not helping or if you have side effects. Tell him of her if you are allergic to any medicine. Keep a list of the medicines, vitamins, and herbs you take. Include the amounts, and when and why you take them. Bring the list or the pill bottles to follow-up visits. Carry your medicine list with you in case of an emergency.  Symptom relief:  The following may help relieve cold symptoms, such as a dry throat and congestion:  Gargle with mouthwash or warm salt water as directed.     Suck on throat lozenges or hard candy.     Use a cold or warm vaporizer or humidifier to ease your breathing.     Rest for at least 2 days and then as needed to decrease tiredness and weakness.     Use petroleum based jelly around your nostrils to decrease irritation from blowing your nose.  Drink liquids:  Liquids will help thin and loosen thick mucus so you can cough it up. Liquids will also keep you hydrated. Ask your healthcare provider which liquids are best for you and how much to drink each day.  Prevent the spread of germs:  You can spread your cold germs to others for at least 3 days after your symptoms start. Wash your hands often. Do not share items, such as eating utensils. Cover your nose and mouth when you cough or sneeze using the crook of your elbow instead of your hands. Throw used tissues in the garbage.  Do not smoke:  Smoking may worsen your symptoms and increase the length of time you feel sick. Talk with your healthcare provider if you need help to stop smoking.  Follow up with your healthcare provider as directed:  Write down your questions so you remember to ask them during your visits.   © 2017 [employer] Information is for End User's use only and may not be sold, redistributed or otherwise used for commercial purposes. All illustrations and images included in  CareNotes® are the copyrighted property of [employer], [employer] or [employer].  The above information is an  only. It is not intended as medical advice for individual conditions or treatments. Talk to your doctor, nurse or pharmacist before following any medical regimen to see if it is safe and effective for you.

## 2025-01-08 ENCOUNTER — TELEPHONE (OUTPATIENT)
Dept: RADIOLOGY | Facility: CLINIC | Age: 60
End: 2025-01-08

## 2025-01-08 ENCOUNTER — HOSPITAL ENCOUNTER (OUTPATIENT)
Dept: RADIOLOGY | Facility: CLINIC | Age: 60
Discharge: HOME | End: 2025-01-08
Payer: COMMERCIAL

## 2025-01-08 VITALS
DIASTOLIC BLOOD PRESSURE: 71 MMHG | TEMPERATURE: 97.5 F | RESPIRATION RATE: 20 BRPM | OXYGEN SATURATION: 95 % | SYSTOLIC BLOOD PRESSURE: 128 MMHG | HEART RATE: 77 BPM

## 2025-01-08 DIAGNOSIS — M47.816 LUMBAR SPONDYLOSIS: ICD-10-CM

## 2025-01-08 PROCEDURE — 64635 DESTROY LUMB/SAC FACET JNT: CPT | Performed by: ANESTHESIOLOGY

## 2025-01-08 PROCEDURE — 64636 DESTROY L/S FACET JNT ADDL: CPT | Performed by: ANESTHESIOLOGY

## 2025-01-08 RX ORDER — BUPIVACAINE HYDROCHLORIDE 5 MG/ML
3 INJECTION, SOLUTION EPIDURAL; INTRACAUDAL ONCE
Status: COMPLETED | OUTPATIENT
Start: 2025-01-08 | End: 2025-01-08

## 2025-01-08 RX ORDER — LIDOCAINE HYDROCHLORIDE 10 MG/ML
9 INJECTION, SOLUTION EPIDURAL; INFILTRATION; INTRACAUDAL; PERINEURAL ONCE
Status: COMPLETED | OUTPATIENT
Start: 2025-01-08 | End: 2025-01-08

## 2025-01-08 RX ADMIN — BUPIVACAINE HYDROCHLORIDE 3 ML: 5 INJECTION, SOLUTION EPIDURAL; INTRACAUDAL; PERINEURAL at 16:21

## 2025-01-08 RX ADMIN — LIDOCAINE HYDROCHLORIDE 9 ML: 10 INJECTION, SOLUTION EPIDURAL; INFILTRATION; INTRACAUDAL; PERINEURAL at 16:17

## 2025-01-08 RX ADMIN — LIDOCAINE HYDROCHLORIDE 3 ML: 20 INJECTION, SOLUTION EPIDURAL; INFILTRATION; INTRACAUDAL; PERINEURAL at 16:21

## 2025-01-08 NOTE — DISCHARGE INSTR - LAB

## 2025-01-08 NOTE — H&P
History of Present Illness: The patient is a 59 y.o. male who presents with complaints of low back pain.    Past Medical History:   Diagnosis Date    Allergic     Anxiety     Diabetes mellitus (HCC)     GERD (gastroesophageal reflux disease)     Hyperlipidemia     Hypertension     Pre-operative clearance 12/08/2023    Sleep apnea     does not use CPAP anymore       Past Surgical History:   Procedure Laterality Date    ASD REPAIR      COLONOSCOPY  01/25/2019    Due 1/2024    HERNIA REPAIR      rpr umbil johny reduc >5 yr    VT CORRECTION HAMMERTOE Left 1/5/2024    Procedure: 4TH AND 5TH HAMMERTOE CORRECTION;  Surgeon: Andrae Lamas DPM;  Location:  MAIN OR;  Service: Podiatry    PSA,SCREENING (HISTORICAL)  01/28/2019    WISDOM TOOTH EXTRACTION           Current Outpatient Medications:     benzonatate (TESSALON) 200 MG capsule, Take 1 capsule (200 mg total) by mouth 3 (three) times a day as needed for cough for up to 5 days, Disp: 15 capsule, Rfl: 0    [employer] Unifine Pentips Plus 31G X 8 MM MISC, USE TO INJECT INSULIN THREE TIMES A DAY, Disp: , Rfl:     cetirizine (ZyrTEC) 10 mg tablet, Take 10 mg by mouth daily, Disp: , Rfl:     citalopram (CeleXA) 20 mg tablet, TAKE ONE TABLET BY MOUTH EVERY DAY, Disp: 90 tablet, Rfl: 1    Continuous Blood Gluc Sensor (Dexcom G7 Sensor), USE ONE SENSOR EVERY 10 DAYS, Disp: , Rfl:     gabapentin (NEURONTIN) 300 mg capsule, TAKE ONE CAPSULE BY MOUTH TWICE A DAY (Patient not taking: Reported on 11/25/2024), Disp: 60 capsule, Rfl: 5    insulin glargine (Lantus SoloStar) 100 units/mL injection pen, Inject 19 Units under the skin every 12 (twelve) hours, Disp: , Rfl:     irbesartan (AVAPRO) 300 mg tablet, Take 300 mg by mouth daily, Disp: , Rfl:     metFORMIN (GLUCOPHAGE) 1000 MG tablet, Take 1 tablet (1,000 mg total) by mouth 2 (two) times a day, Disp: 60 tablet, Rfl: 0    Mounjaro 5 MG/0.5ML SOPN, Inject 10 mg under the skin every 7 days, Disp: , Rfl:     naproxen (NAPROSYN)  500 mg tablet, Take 1 tablet (500 mg total) by mouth 2 (two) times a day with meals, Disp: 30 tablet, Rfl: 0    rosuvastatin (CRESTOR) 20 MG tablet, Take 1 tablet (20 mg total) by mouth daily, Disp: 30 tablet, Rfl: 0    Allergies   Allergen Reactions    Dust Mite Extract Allergic Rhinitis    Molds & Smuts Allergic Rhinitis    Pollen Extract Allergic Rhinitis     Maple trees    Strawberry Extract - Food Allergy Rash       Physical Exam:   Vitals:    01/08/25 1606   BP: 147/72   Pulse: 89   Resp: 18   Temp: 97.5 °F (36.4 °C)   SpO2: 93%     General: Awake, Alert, Oriented x 3, Mood and affect appropriate  Respiratory: Respirations even and unlabored  Cardiovascular: Peripheral pulses intact; no edema  Musculoskeletal Exam: normal gait    ASA Score: 3    Patient/Chart Verification  Patient ID Verified: Verbal  ID Band Applied: No  Consents Confirmed: Procedural, To be obtained in the Pre-Procedure area  H&P( within 30 days) Verified: To be obtained in the Procedural area  Allergies Reviewed: Yes  Anticoag/NSAID held?: No  Currently on antibiotics?: No  Does Patient Have a Prosthetic Device/Implant: No (denies pacer / ICD)    Assessment:   1. Lumbar spondylosis        Plan: Right L3-5 RFA

## 2025-01-10 ENCOUNTER — APPOINTMENT (OUTPATIENT)
Dept: PHYSICAL THERAPY | Age: 60
End: 2025-01-10
Payer: COMMERCIAL

## 2025-01-13 NOTE — TELEPHONE ENCOUNTER
S/w pt, states he is doing well following RFA, reports pain level 4 / 10, denies s/sx infection. Advised 4-6 weeks for full benefit. Pt states he is at work and unable to schedule OV at this time, aware 6 week f/u needed.

## 2025-01-16 DIAGNOSIS — F41.9 ANXIETY: ICD-10-CM

## 2025-01-16 RX ORDER — CITALOPRAM HYDROBROMIDE 20 MG/1
20 TABLET ORAL DAILY
Qty: 90 TABLET | Refills: 1 | Status: SHIPPED | OUTPATIENT
Start: 2025-01-16

## 2025-01-22 ENCOUNTER — TELEPHONE (OUTPATIENT)
Age: 60
End: 2025-01-22

## 2025-01-22 NOTE — TELEPHONE ENCOUNTER
Patient has been struggling with a cold off and on for 3-4 weeks now it is back again. Symptoms of congestion (head/nose), green mucus, and sore throat. He had a bad cough previously with congestion in his chest but that had improve but he just can't get rid of it completely. Please review and advise, left a message if don't answer patient is at work. If he needs to be seen to get something for it, he needs a late in the day or Friday.

## 2025-01-23 DIAGNOSIS — J06.9 ACUTE URI: Primary | ICD-10-CM

## 2025-02-04 ENCOUNTER — OFFICE VISIT (OUTPATIENT)
Dept: FAMILY MEDICINE CLINIC | Facility: CLINIC | Age: 60
End: 2025-02-04
Payer: COMMERCIAL

## 2025-02-04 VITALS
BODY MASS INDEX: 27.58 KG/M2 | DIASTOLIC BLOOD PRESSURE: 74 MMHG | TEMPERATURE: 97.3 F | HEART RATE: 84 BPM | WEIGHT: 197 LBS | SYSTOLIC BLOOD PRESSURE: 110 MMHG | OXYGEN SATURATION: 97 % | RESPIRATION RATE: 20 BRPM | HEIGHT: 71 IN

## 2025-02-04 DIAGNOSIS — J20.9 ACUTE BRONCHITIS, UNSPECIFIED ORGANISM: Primary | ICD-10-CM

## 2025-02-04 PROCEDURE — 99213 OFFICE O/P EST LOW 20 MIN: CPT | Performed by: FAMILY MEDICINE

## 2025-02-04 RX ORDER — PREDNISONE 20 MG/1
TABLET ORAL
Qty: 11 TABLET | Refills: 0 | Status: SHIPPED | OUTPATIENT
Start: 2025-02-04

## 2025-02-04 NOTE — PROGRESS NOTES
"Name: Checo Felder      : 1965      MRN: [id number]  Encounter Provider: Seb Obando MD  Encounter Date: 2025   Encounter department: [facility]  :  Assessment & Plan  Acute bronchitis, unspecified organism  Patient has had symptoms for past 3-4 weeks. Doing better since on antibiotics. Still has tightness. Will try prednisone taper and patient to take mucinex as needed. Call if no better.   Orders:  •  predniSONE 20 mg tablet; Take 2 tabs daily for 3 days, then take 1 tab daily for 3 days, then take 1/2 tab daily for 3 days          Depression Screening and Follow-up Plan: Patient was screened for depression during today's encounter. They screened negative with a PHQ-2 score of 0.      History of Present Illness   Patient here for 3-4 week history of chest congestion and cough. Has had tightness in chest. No wheeze. No fever or chills. No sore throat or headaches. Was on Augmentin and helped some.       Review of Systems   Constitutional:  Negative for chills, fatigue and fever.   HENT:  Negative for congestion, ear pain, postnasal drip, rhinorrhea, sinus pressure, sinus pain, sore throat and trouble swallowing.    Respiratory:  Positive for cough and chest tightness. Negative for shortness of breath and wheezing.    Cardiovascular:  Negative for chest pain.   Gastrointestinal:  Negative for abdominal pain, diarrhea, nausea and vomiting.   Musculoskeletal:  Negative for arthralgias.   Skin:  Negative for rash.   Neurological:  Negative for dizziness and headaches.       Objective   /74 (BP Location: Left arm, Patient Position: Sitting, Cuff Size: Standard)   Pulse 84   Temp (!) 97.3 °F (36.3 °C) (Tympanic)   Resp 20   Ht 5' 11\" (1.803 m)   Wt 89.4 kg (197 lb)   SpO2 97%   BMI 27.48 kg/m²      Physical Exam  Constitutional:       General: He is not in acute distress.     Appearance: Normal appearance. He is not ill-appearing.   HENT:      Right Ear: Tympanic " membrane, ear canal and external ear normal.      Left Ear: Tympanic membrane, ear canal and external ear normal.      Nose: No congestion or rhinorrhea.      Mouth/Throat:      Mouth: Mucous membranes are moist.      Pharynx: Oropharynx is clear. No posterior oropharyngeal erythema.   Cardiovascular:      Rate and Rhythm: Normal rate and regular rhythm.      Heart sounds: Normal heart sounds. No murmur heard.  Pulmonary:      Effort: Pulmonary effort is normal.      Breath sounds: Normal breath sounds. No wheezing or rhonchi.   Lymphadenopathy:      Cervical: No cervical adenopathy.   Neurological:      Mental Status: He is alert.

## 2025-02-04 NOTE — ASSESSMENT & PLAN NOTE
Patient has had symptoms for past 3-4 weeks. Doing better since on antibiotics. Still has tightness. Will try prednisone taper and patient to take mucinex as needed. Call if no better.   Orders:  •  predniSONE 20 mg tablet; Take 2 tabs daily for 3 days, then take 1 tab daily for 3 days, then take 1/2 tab daily for 3 days

## 2025-02-19 DIAGNOSIS — Z79.4 TYPE 2 DIABETES MELLITUS WITHOUT COMPLICATION, WITH LONG-TERM CURRENT USE OF INSULIN (HCC): ICD-10-CM

## 2025-02-19 DIAGNOSIS — E11.9 TYPE 2 DIABETES MELLITUS WITHOUT COMPLICATION, WITH LONG-TERM CURRENT USE OF INSULIN (HCC): ICD-10-CM

## 2025-03-13 DIAGNOSIS — F41.9 ANXIETY: ICD-10-CM

## 2025-03-14 ENCOUNTER — TELEPHONE (OUTPATIENT)
Age: 60
End: 2025-03-14

## 2025-03-14 DIAGNOSIS — F41.9 ANXIETY: ICD-10-CM

## 2025-03-14 RX ORDER — CITALOPRAM HYDROBROMIDE 20 MG/1
TABLET ORAL
Qty: 135 TABLET | Refills: 2 | Status: SHIPPED | OUTPATIENT
Start: 2025-03-14

## 2025-03-14 RX ORDER — CITALOPRAM HYDROBROMIDE 20 MG/1
20 TABLET ORAL DAILY
Qty: 90 TABLET | Refills: 1 | Status: SHIPPED | OUTPATIENT
Start: 2025-03-14 | End: 2025-03-14 | Stop reason: SDUPTHER

## 2025-03-14 NOTE — TELEPHONE ENCOUNTER
Pt called, he has been taking citalopram 20mg 1 1/2 tablets daily since OV 11/15/24.  Patient stated Rx that was sent to pharmacy is for 20 mg daily and pharmacy will not fill Rx saying it is too soon to fill.    Patient is asking if provider can send another RX ASA he is at the [facility], he is also asking if Rx can be changed to 30 mg tablet, it is difficult to break 20mg in half.    Pt is requesting call back if he should wait at pharmacy.

## 2025-05-01 ENCOUNTER — TELEPHONE (OUTPATIENT)
Age: 60
End: 2025-05-01

## 2025-05-01 DIAGNOSIS — J06.9 ACUTE URI: Primary | ICD-10-CM

## 2025-05-01 RX ORDER — AMOXICILLIN 875 MG/1
875 TABLET, COATED ORAL 2 TIMES DAILY
Qty: 14 TABLET | Refills: 0 | Status: SHIPPED | OUTPATIENT
Start: 2025-05-01 | End: 2025-05-08

## 2025-05-01 NOTE — TELEPHONE ENCOUNTER
Curry called to request an antibiotic for his nasal congestion state that he have the same symptoms most of the time and he would like the provider to sent him some antibiotic.     Please advice and contact the patient

## 2025-05-01 NOTE — TELEPHONE ENCOUNTER
Patient called in to check on the status of his request.  He stated it was not yet called in to his pharmacy.  Informed him to check back with them later.

## 2025-07-26 DIAGNOSIS — E78.5 DYSLIPIDEMIA: ICD-10-CM

## 2025-07-28 RX ORDER — ROSUVASTATIN CALCIUM 20 MG/1
20 TABLET, COATED ORAL DAILY
Qty: 30 TABLET | Refills: 0 | Status: SHIPPED | OUTPATIENT
Start: 2025-07-28

## (undated) DEVICE — DRAPE C-ARM X-RAY

## (undated) DEVICE — PRECISION THIN (5.5 X 0.38 X 18.0MM)

## (undated) DEVICE — STERILE POLYISOPRENE POWDER-FREE SURGICAL GLOVES: Brand: PROTEXIS

## (undated) DEVICE — ACE WRAP 4 IN UNSTERILE

## (undated) DEVICE — INTENDED FOR TISSUE SEPARATION, AND OTHER PROCEDURES THAT REQUIRE A SHARP SURGICAL BLADE TO PUNCTURE OR CUT.: Brand: BARD-PARKER SAFETY BLADES SIZE 15, STERILE

## (undated) DEVICE — KERLIX BANDAGE ROLL: Brand: KERLIX

## (undated) DEVICE — SYRINGE 10ML LL

## (undated) DEVICE — DISPOSABLE OR TOWEL: Brand: CARDINAL HEALTH

## (undated) DEVICE — INTENDED FOR TISSUE SEPARATION, AND OTHER PROCEDURES THAT REQUIRE A SHARP SURGICAL BLADE TO PUNCTURE OR CUT.: Brand: BARD-PARKER ® SAFETYLOCK CARBON RIB-BACK BLADES

## (undated) DEVICE — BETHLEHEM UNIVERSAL  MIONR EXT: Brand: CARDINAL HEALTH

## (undated) DEVICE — POV-IOD SOLUTION 4OZ BT

## (undated) DEVICE — PENCIL ELECTROSURG E-Z CLEAN -0035H

## (undated) DEVICE — CURITY NON-ADHERENT STRIPS: Brand: CURITY

## (undated) DEVICE — NEEDLE BLUNT 18 G X 1 1/2IN

## (undated) DEVICE — CUFF TOURNIQUET 18 X 4 IN QUICK CONNECT DISP 1 BLADDER

## (undated) DEVICE — CHLORAPREP HI-LITE 26ML ORANGE

## (undated) DEVICE — SCD SEQUENTIAL COMPRESSION COMFORT SLEEVE MEDIUM KNEE LENGTH: Brand: KENDALL SCD

## (undated) DEVICE — STERILE POLYISOPRENE POWDER-FREE SURGICAL GLOVES WITH EMOLLIENT COATING: Brand: PROTEXIS

## (undated) DEVICE — NEEDLE 25G X 1 1/2

## (undated) DEVICE — SINGLE PORT MANIFOLD: Brand: NEPTUNE 2

## (undated) DEVICE — DRAPE SHEET THREE QUARTER

## (undated) DEVICE — STOCKINETTE 2P PREROLLD 6X60